# Patient Record
Sex: FEMALE | Race: WHITE | Employment: FULL TIME | URBAN - METROPOLITAN AREA
[De-identification: names, ages, dates, MRNs, and addresses within clinical notes are randomized per-mention and may not be internally consistent; named-entity substitution may affect disease eponyms.]

---

## 2018-01-17 ENCOUNTER — TELEPHONE (OUTPATIENT)
Dept: FAMILY MEDICINE | Facility: CLINIC | Age: 48
End: 2018-01-17

## 2018-01-17 NOTE — TELEPHONE ENCOUNTER
Spoke with patient and pharmacy and confirmed that medication was sent and will be ready for  today.   Pharmacy will call patient to let know when medication is ready.  Lesa Agarwal RN

## 2018-01-17 NOTE — TELEPHONE ENCOUNTER
Reason for call:  Medication   If this is a refill request, has the caller requested the refill from the pharmacy already? Yes  Will the patient be using a [facility]? No  Name of the pharmacy and phone number for the current request: [facility],     Name of the medication requested: Patient is unsure of the name, but it is the two active medications in her chart.    Other request: None      Phone number to reach patient:  Home number on file [phone] (home)    Best Time:  Anytime, ASAP    Can we leave a detailed message on this number?  YES

## 2018-02-01 NOTE — PROGRESS NOTES
"    SUBJECTIVE:                                                    Danielle Thrasher is a 47 year old female who presents to clinic today for the following health issues:  {Provider please address medication reconciliation discrepancies--rooming staff please delete if no med/rec issues}    {Superlists:421559}    {additional problems for provider to add:680399}    Problem list and histories reviewed & adjusted, as indicated.  Additional history: {NONE - AS DOCUMENTED:992190::\"as documented\"}    {HIST REVIEW/ LINKS 2:520555}    {PROVIDER CHARTING PREFERENCE:976076}      "

## 2018-02-06 ENCOUNTER — OFFICE VISIT (OUTPATIENT)
Dept: FAMILY MEDICINE | Facility: CLINIC | Age: 48
End: 2018-02-06
Payer: COMMERCIAL

## 2018-02-06 VITALS
DIASTOLIC BLOOD PRESSURE: 72 MMHG | WEIGHT: 151 LBS | OXYGEN SATURATION: 100 % | SYSTOLIC BLOOD PRESSURE: 132 MMHG | RESPIRATION RATE: 12 BRPM | HEIGHT: 62 IN | HEART RATE: 61 BPM | TEMPERATURE: 97.7 F | BODY MASS INDEX: 27.79 KG/M2

## 2018-02-06 DIAGNOSIS — Z00.00 ROUTINE GENERAL MEDICAL EXAMINATION AT A HEALTH CARE FACILITY: Primary | ICD-10-CM

## 2018-02-06 DIAGNOSIS — N94.6 DYSMENORRHEA: ICD-10-CM

## 2018-02-06 DIAGNOSIS — Z30.41 ENCOUNTER FOR SURVEILLANCE OF CONTRACEPTIVE PILLS: ICD-10-CM

## 2018-02-06 DIAGNOSIS — F41.1 GAD (GENERALIZED ANXIETY DISORDER): ICD-10-CM

## 2018-02-06 DIAGNOSIS — Z23 NEED FOR PROPHYLACTIC VACCINATION AND INOCULATION AGAINST INFLUENZA: ICD-10-CM

## 2018-02-06 DIAGNOSIS — J45.20 MILD INTERMITTENT ASTHMA WITHOUT COMPLICATION: ICD-10-CM

## 2018-02-06 DIAGNOSIS — Z12.31 VISIT FOR SCREENING MAMMOGRAM: ICD-10-CM

## 2018-02-06 PROCEDURE — 90471 IMMUNIZATION ADMIN: CPT | Performed by: FAMILY MEDICINE

## 2018-02-06 PROCEDURE — 99396 PREV VISIT EST AGE 40-64: CPT | Performed by: FAMILY MEDICINE

## 2018-02-06 PROCEDURE — 90686 IIV4 VACC NO PRSV 0.5 ML IM: CPT | Performed by: FAMILY MEDICINE

## 2018-02-06 RX ORDER — CITALOPRAM HYDROBROMIDE 20 MG/1
20 TABLET ORAL DAILY
Qty: 90 TABLET | Refills: 3 | Status: SHIPPED | OUTPATIENT
Start: 2018-02-06 | End: 2019-01-18

## 2018-02-06 NOTE — MR AVS SNAPSHOT
After Visit Summary   2/6/2018    Danielle Thrasher    MRN: [id number]           Patient Information     Date Of Birth          1970        Visit Information        Provider Department      2/6/2018 3:40 PM Mehnaz Cabrera MD [facility]        Today's Diagnoses     Routine general medical examination at a health care facility    -  1    Mild intermittent asthma without complication        BRIDGET (generalized anxiety disorder)        Dysmenorrhea        Encounter for surveillance of contraceptive pills        Visit for screening mammogram          Care Instructions    [facility]    If you have any questions regarding to your visit please contact your care team:       Team Red:   Clinic Hours Telephone Number   Dr. Mehnaz Lilly, NP   7am-7pm  Monday - Thursday   7am-5pm  Fridays  [phone]  (Appointment scheduling available 24/7)    Questions about your visit?   Team Line  [phone]   Urgent Care - [place] and [place] - 11am-9pm Monday-Friday Saturday-Sunday- 9am-5pm   [place] - 5pm-9pm Monday-Friday Saturday-Sunday- 9am-5pm  [phone] - [place]   [phone] - [place]       What options do I have for visits at the clinic other than the traditional office visit?  To expand how we care for you, many of our providers are utilizing electronic visits (e-visits) and telephone visits, when medically appropriate, for interactions with their patients rather than a visit in the clinic.   We also offer nurse visits for many medical concerns. Just like any other service, we will bill your insurance company for this type of visit based on time spent on the phone with your provider. Not all insurance companies cover these visits. Please check with your medical insurance if this type of visit is covered. You will be responsible for any charges that are not paid by your insurance.      E-visits via  UsTrendyhart:  generally incur a $35.00 fee.  Telephone visits:  Time spent on the phone: *charged based on time that is spent on the phone in increments of 10 minutes. Estimated cost:   5-10 mins $30.00   11-20 mins. $59.00   21-30 mins. $85.00     Use UsTrendyhart (secure email communication and access to your chart) to send your primary care provider a message or make an appointment. Ask someone on your Team how to sign up for G[employer]©nie NumÃ©rique.  For a Price Quote for your services, please call our [place] Line at [phone].      As always, Thank you for trusting us with your health care needs!      Preventive Health Recommendations  Female Ages 40 to 49    Yearly exam:     See your health care provider every year in order to  1. Review health changes.   2. Discuss preventive care.    3. Review your medicines if your doctor prescribed any.      Get a Pap test every three years (unless you have an abnormal result and your provider advises testing more often).      If you get Pap tests with HPV test, you only need to test every 5 years, unless you have an abnormal result. You do not need a Pap test if your uterus was removed (hysterectomy) and you have not had cancer.      You should be tested each year for STDs (sexually transmitted diseases), if you're at risk.       Ask your doctor if you should have a mammogram.      Have a colonoscopy (test for colon cancer) if someone in your family has had colon cancer or polyps before age 50.       Have a cholesterol test every 5 years.       Have a diabetes test (fasting glucose) after age 45. If you are at risk for diabetes, you should have this test every 3 years.    Shots: Get a flu shot each year. Get a tetanus shot every 10 years.     Nutrition:     Eat at least 5 servings of fruits and vegetables each day.    Eat whole-grain bread, whole-wheat pasta and brown rice instead of white grains and rice.    Talk to your provider about Calcium and Vitamin D.     Lifestyle    Exercise  at least 150 minutes a week (an average of 30 minutes a day, 5 days a week). This will help you control your weight and prevent disease.    Limit alcohol to one drink per day.    No smoking.     Wear sunscreen to prevent skin cancer.    See your dentist every six months for an exam and cleaning.          Follow-ups after your visit        Additional Services     OB/GYN REFERRAL       Your provider has referred you to:  [place]: [facility] [phone]   [email or web address]    Please be aware that coverage of these services is subject to the terms and limitations of your health insurance plan.  Call member services at your health plan with any benefit or coverage questions.      Please bring the following with you to your appointment:    (1) Any X-Rays, CTs or MRIs which have been performed.  Contact the facility where they were done to arrange for  prior to your scheduled appointment.   (2) List of current medications   (3) This referral request   (4) Any documents/labs given to you for this referral                  Follow-up notes from your care team     Return in about 1 year (around 2/6/2019) for physical, asthma.      Future tests that were ordered for you today     Open Future Orders        Priority Expected Expires Ordered    *MA Screening Digital Bilateral Routine  2/6/2019 2/6/2018    Lipid panel reflex to direct LDL Fasting Routine 3/20/2018 2/6/2019 2/6/2018            Who to contact     If you have questions or need follow up information about today's clinic visit or your schedule please contact [facility] [place] directly at [phone].  Normal or non-critical lab and imaging results will be communicated to you by MyChart, letter or phone within 4 business days after the clinic has received the results. If you do not hear from us within 7 days, please contact the clinic through MyChart or phone. If you have a critical or abnormal lab  "result, we will notify you by phone as soon as possible.  Submit refill requests through PeopleDoc or call your pharmacy and they will forward the refill request to us. Please allow 3 business days for your refill to be completed.          Additional Information About Your Visit        Agent Partnerhart Information     PeopleDoc gives you secure access to your electronic health record. If you see a primary care provider, you can also send messages to your care team and make appointments. If you have questions, please call your primary care clinic.  If you do not have a primary care provider, please call [phone] and they will assist you.        Care EveryWhere ID     This is your Care EveryWhere ID. This could be used by other organizations to access your [place] medical records  [id number]        Your Vitals Were     Pulse Temperature Respirations Height Last Period Pulse Oximetry    61 97.7  F (36.5  C) (Oral) 12 5' 2.25\" (1.581 m) 01/16/2018 100%    Breastfeeding? BMI (Body Mass Index)                No 27.4 kg/m2           Blood Pressure from Last 3 Encounters:   02/06/18 132/72   11/10/16 116/70   10/08/15 130/84    Weight from Last 3 Encounters:   02/06/18 151 lb (68.5 kg)   11/10/16 135 lb 12.8 oz (61.6 kg)   10/08/15 158 lb 6.4 oz (71.8 kg)              We Performed the Following     OB/GYN REFERRAL          Today's Medication Changes          These changes are accurate as of 2/6/18  4:27 PM.  If you have any questions, ask your nurse or doctor.               These medicines have changed or have updated prescriptions.        Dose/Directions    citalopram 20 MG tablet   Commonly known as:  celeXA   This may have changed:  See the new instructions.   Used for:  BRIDGET (generalized anxiety disorder)   Changed by:  Mehnaz Cabrera MD        Dose:  20 mg   Take 1 tablet (20 mg) by mouth daily   Quantity:  90 tablet   Refills:  3       levonorgestrel-ethinyl estradiol per tablet   Commonly known as:  ENPRESSE   This may " have changed:  See the new instructions.   Used for:  Encounter for surveillance of contraceptive pills   Changed by:  Mehnaz Cabrera MD        Dose:  1 tablet   Take 1 tablet by mouth daily   Quantity:  84 tablet   Refills:  4            Where to get your medicines      These medications were sent to [id number]  [place], [place] - [address] AT [facility] [address]  [address], [place] [address]     Phone:  [phone]     citalopram 20 MG tablet    levonorgestrel-ethinyl estradiol per tablet                Primary Care Provider Office Phone # Fax #    Mehnaz Cabrera [phone] [address]  [place] [address]        Equal Access to Services     : Hadii julia burgess hadasho Soomaali, waaxda luqadaha, qaybta kaalmada adeegyada, waxay alexin hayrasheed valentin . So [facility] [phone].    ATENCIÓN: Si habla español, tiene a geronimo disposición servicios gratuitos de asistencia lingüística. [facility] [phone].    We comply with applicable federal civil rights laws and [place] laws. We do not discriminate on the basis of race, color, national origin, age, disability, sex, sexual orientation, or gender identity.            Thank you!     Thank you for choosing [facility]  for your care. Our goal is always to provide you with excellent care. Hearing back from our patients is one way we can continue to improve our services. Please take a few minutes to complete the written survey that you may receive in the mail after your visit with us. Thank you!             Your Updated Medication List - Protect others around you: Learn how to safely use, store and throw away your medicines at www.disposemymeds.org.          This list is accurate as of 2/6/18  4:27 PM.  Always use your most recent med list.                   Brand Name Dispense Instructions for use Diagnosis    acetaminophen 500 MG tablet    TYLENOL    100  tablet    Take 1-2 tablets by mouth every 6 hours as needed for pain and fever.    Routine general medical examination at a health care facility       albuterol 108 (90 BASE) MCG/ACT Inhaler    PROAIR HFA/PROVENTIL HFA/VENTOLIN HFA    1 Inhaler    Inhale 2 puffs into the lungs every 4 hours as needed for shortness of breath / dyspnea or wheezing    Intermittent asthma, uncomplicated       calcium carbonate 1250 MG tablet    OS-JACKSON 500 mg [place]. Ca     Take 500 mg by mouth daily        cetirizine 10 MG tablet    ZYRTEC    90 tablet    Take 1 tablet by mouth daily.    Allergic rhinitis       citalopram 20 MG tablet    celeXA    90 tablet    Take 1 tablet (20 mg) by mouth daily    BRIDGET (generalized anxiety disorder)       levonorgestrel-ethinyl estradiol per tablet    ENPRESSE    84 tablet    Take 1 tablet by mouth daily    Encounter for surveillance of contraceptive pills       Multi-vitamin Tabs tablet   Generic drug:  multivitamin, therapeutic with minerals     90 tablet    Take 1 tablet by mouth daily.    Routine general medical examination at a health care facility       OMEGA-3 FISH OIL PO      Take by mouth daily        VITAMIN C PO      Take 500 mg by mouth

## 2018-02-06 NOTE — LETTER
2/6/2018          Danielle Thrasher  [address] [place] [address]    [id number]    Dear Danielle,    I recommend that you maintain a goal weight of 144 pounds.     Sincerely,        Mehnaz Cabrera MD  Dept of Family Practice

## 2018-02-06 NOTE — PATIENT INSTRUCTIONS
[facility]    If you have any questions regarding to your visit please contact your care team:       Team Red:   Clinic Hours Telephone Number   Dr. Mehnaz Lilly, NP   7am-7pm  Monday - Thursday   7am-5pm  Fridays  [phone]  (Appointment scheduling available 24/7)    Questions about your visit?   Team Line  [phone]   [facility] [place] [facility] [place] [facility] 11am-9pm Monday-Friday Saturday-Sunday- 9am-5pm   [place] - 5pm-9pm Monday-Friday Saturday-Sunday- 9am-5pm  [phone] - [place]   [phone] - [place]       What options do I have for visits at the clinic other than the traditional office visit?  To expand how we care for you, many of our providers are utilizing electronic visits (e-visits) and telephone visits, when medically appropriate, for interactions with their patients rather than a visit in the clinic.   We also offer nurse visits for many medical concerns. Just like any other service, we will bill your insurance company for this type of visit based on time spent on the phone with your provider. Not all insurance companies cover these visits. Please check with your medical insurance if this type of visit is covered. You will be responsible for any charges that are not paid by your insurance.      E-visits via Earthmill:  generally incur a $35.00 fee.  Telephone visits:  Time spent on the phone: *charged based on time that is spent on the phone in increments of 10 minutes. Estimated cost:   5-10 mins $30.00   11-20 mins. $59.00   21-30 mins. $85.00     Use Jajaht (secure email communication and access to your chart) to send your primary care provider a message or make an appointment. Ask someone on your Team how to sign up for Earthmill.  For a Price Quote for your services, please call our Consumer Price Line at [phone].      As always, Thank you for trusting us with your health care  needs!      Preventive Health Recommendations  Female Ages 40 to 49    Yearly exam:     See your health care provider every year in order to  1. Review health changes.   2. Discuss preventive care.    3. Review your medicines if your doctor prescribed any.      Get a Pap test every three years (unless you have an abnormal result and your provider advises testing more often).      If you get Pap tests with HPV test, you only need to test every 5 years, unless you have an abnormal result. You do not need a Pap test if your uterus was removed (hysterectomy) and you have not had cancer.      You should be tested each year for STDs (sexually transmitted diseases), if you're at risk.       Ask your doctor if you should have a mammogram.      Have a colonoscopy (test for colon cancer) if someone in your family has had colon cancer or polyps before age 50.       Have a cholesterol test every 5 years.       Have a diabetes test (fasting glucose) after age 45. If you are at risk for diabetes, you should have this test every 3 years.    Shots: Get a flu shot each year. Get a tetanus shot every 10 years.     Nutrition:     Eat at least 5 servings of fruits and vegetables each day.    Eat whole-grain bread, whole-wheat pasta and brown rice instead of white grains and rice.    Talk to your provider about Calcium and Vitamin D.     Lifestyle    Exercise at least 150 minutes a week (an average of 30 minutes a day, 5 days a week). This will help you control your weight and prevent disease.    Limit alcohol to one drink per day.    No smoking.     Wear sunscreen to prevent skin cancer.    See your dentist every six months for an exam and cleaning.

## 2018-02-06 NOTE — LETTER
My Asthma Action Plan  Name: Danielle Thrasher   YOB: 1970  Date: 2/6/2018   My doctor: Mehnaz Cabrera MD   My clinic: [facility]        My Control Medicine: None  My Rescue Medicine: Albuterol (Proair/Ventolin/Proventil) inhaler     My Asthma Severity: intermittent  Avoid your asthma triggers: upper respiratory infections               GREEN ZONE   Good Control    I feel good    No cough or wheeze    Can work, sleep and play without asthma symptoms       Take your asthma control medicine every day.     1. If exercise triggers your asthma, take your rescue medication    15 minutes before exercise or sports, and    During exercise if you have asthma symptoms  2. Spacer to use with inhaler: If you have a spacer, make sure to use it with your inhaler             YELLOW ZONE Getting Worse  I have ANY of these:    I do not feel good    Cough or wheeze    Chest feels tight    Wake up at night   1. Keep taking your Green Zone medications  2. Start taking your rescue medicine:    every 20 minutes for up to 1 hour. Then every 4 hours for 24-48 hours.  3. If you stay in the Yellow Zone for more than 12-24 hours, contact your doctor.  4. If you do not return to the Green Zone in 12-24 hours or you get worse, start taking your oral steroid medicine if prescribed by your provider.           RED ZONE Medical Alert - Get Help  I have ANY of these:    I feel awful    Medicine is not helping    Breathing getting harder    Trouble walking or talking    Nose opens wide to breathe       1. Take your rescue medicine NOW  2. If your provider has prescribed an oral steroid medicine, start taking it NOW  3. Call your doctor NOW  4. If you are still in the Red Zone after 20 minutes and you have not reached your doctor:    Take your rescue medicine again and    Call 911 or go to the emergency room right away    See your regular doctor within 2 weeks of an Emergency Room or Urgent Care visit for follow-up  treatment.        Electronically signed by: Mehnaz Cabrera, February 6, 2018    Annual Reminders:  Meet with Asthma Educator,  Flu Shot in the Fall, consider Pneumonia Vaccination for patients with asthma (aged 19 and older).    [id number] [place], [place] - [address] AT [address] [facility]                    Asthma Triggers  How To Control Things That Make Your Asthma Worse    Triggers are things that make your asthma worse.  Look at the list below to help you find your triggers and what you can do about them.  You can help prevent asthma flare-ups by staying away from your triggers.      Trigger                                                          What you can do   Cigarette Smoke  Tobacco smoke can make asthma worse. Do not allow smoking in your home, car or around you.  Be sure no one smokes at a child s day care or school.  If you smoke, ask your health care provider for ways to help you quit.  Ask family members to quit too.  Ask your health care provider for a referral to Quit Plan to help you quit smoking, or call 8-368-585-PLAN.     Colds, Flu, Bronchitis  These are common triggers of asthma. Wash your hands often.  Don t touch your eyes, nose or mouth.  Get a flu shot every year.     Dust Mites  These are tiny bugs that live in cloth or carpet. They are too small to see. Wash sheets and blankets in hot water every week.   Encase pillows and mattress in dust mite proof covers.  Avoid having carpet if you can. If you have carpet, vacuum weekly.   Use a dust mask and HEPA vacuum.   Pollen and Outdoor Mold  Some people are allergic to trees, grass, or weed pollen, or molds. Try to keep your windows closed.  Limit time out doors when pollen count is high.   Ask you health care provider about taking medicine during allergy season.     Animal Dander  Some people are allergic to skin flakes, urine or saliva from pets with fur or feathers. Keep pets with fur or  feathers out of your home.    If you can t keep the pet outdoors, then keep the pet out of your bedroom.  Keep the bedroom door closed.  Keep pets off cloth furniture and away from stuffed toys.     Mice, Rats, and Cockroaches  Some people are allergic to the waste from these pests.   Cover food and garbage.  Clean up spills and food crumbs.  Store grease in the refrigerator.   Keep food out of the bedroom.   Indoor Mold  This can be a trigger if your home has high moisture. Fix leaking faucets, pipes, or other sources of water.   Clean moldy surfaces.  Dehumidify basement if it is damp and smelly.   Smoke, Strong Odors, and Sprays  These can reduce air quality. Stay away from strong odors and sprays, such as perfume, powder, hair spray, paints, smoke incense, paint, cleaning products, candles and new carpet.   Exercise or Sports  Some people with asthma have this trigger. Be active!  Ask your doctor about taking medicine before sports or exercise to prevent symptoms.    Warm up for 5-10 minutes before and after sports or exercise.     Other Triggers of Asthma  Cold air:  Cover your nose and mouth with a scarf.  Sometimes laughing or crying can be a trigger.  Some medicines and food can trigger asthma.

## 2018-02-06 NOTE — PROGRESS NOTES
SUBJECTIVE:   CC: Danielle Thrasher is an 47 year old woman  who presents for preventive health visit.     Patient also has asthma, intermittent.  Patient has had asthma for years.  Occasional wheezing and shortness of breath are triggered by colds and relieved by albuterol. Patient also presents for follow-up of anxiety, a problem for years.  Excessive worrry, rumination and insomnia are not accompanied by palpitations.  Symptoms are controlled by medications with no side effects. She has painful periods x 6 months despite oral contraception.     Physical   Annual:     Getting at least 3 servings of Calcium per day::  Yes    Bi-annual eye exam::  Yes    Dental care twice a year::  NO    Sleep apnea or symptoms of sleep apnea::  None    Diet::  Other    Frequency of exercise::  6-7 days/week    Duration of exercise::  Greater than 60 minutes    Taking medications regularly::  Yes    Medication side effects::  None    Additional concerns today::  YES                Painful menstrual periods    Today's PHQ-2 Score:   PHQ-2 (  [employer]) 2018   Q1: Little interest or pleasure in doing things 1   Q2: Feeling down, depressed or hopeless 0   PHQ-2 Score 1   Q1: Little interest or pleasure in doing things Several days   Q2: Feeling down, depressed or hopeless Not at all   PHQ-2 Score 1       Abuse: Current or Past(Physical, Sexual or Emotional)- No  Do you feel safe in your environment - Yes    Social History   Substance Use Topics     Smoking status: Never Smoker     Smokeless tobacco: Never Used      Comment: non-smoking household     Alcohol use 1.5 oz/week     3 Standard drinks or equivalent per week      Comment: glass of wine 2-3 times weekly with 1 liquor drink on the weekend     Alcohol Use 2018   If you drink alcohol, do you typically have greater than 3 drinks per day OR greater than 7 drinks per week?   No   No flowsheet data found.    Reviewed orders with patient.  Reviewed health  maintenance and updated orders accordingly - Yes  Patient Active Problem List   Diagnosis     Allergic rhinitis     Patellofemoral pain syndrome     CARDIOVASCULAR SCREENING; LDL GOAL LESS THAN 160     BRIDGET (generalized anxiety disorder)     Intermittent asthma     Dysmenorrhea     Past Surgical History:   Procedure Laterality Date     EXCISE GANGLION WRIST      LT     TONSILLECTOMY & ADENOIDECTOMY         Social History   Substance Use Topics     Smoking status: Never Smoker     Smokeless tobacco: Never Used      Comment: non-smoking household     Alcohol use 1.5 oz/week     3 Standard drinks or equivalent per week      Comment: glass of wine 2-3 times weekly with 1 liquor drink on the weekend     Family History   Problem Relation Age of Onset     Breast Cancer Mother 56     CANCER Mother      skin     CANCER Father      skin     CANCER Maternal Grandfather      liver     DIABETES Maternal Grandfather      HEART DISEASE Maternal Grandfather      questionable MI     CANCER Maternal Grandmother      stomach     C.A.D. Paternal Grandfather 70     MI     Hypertension No family hx of      CEREBROVASCULAR DISEASE No family hx of          Current Outpatient Prescriptions   Medication Sig Dispense Refill     levonorgestrel-ethinyl estradiol (ENPRESSE) per tablet Take 1 tablet by mouth daily 84 tablet 4     citalopram (CELEXA) 20 MG tablet Take 1 tablet (20 mg) by mouth daily 90 tablet 3     Omega-3 Fatty Acids (OMEGA-3 FISH OIL PO) Take by mouth daily       calcium carbonate (OS-JACKSON 500 MG [place]. [place]) 500 MG tablet Take 500 mg by mouth daily       Ascorbic Acid (VITAMIN C PO) Take 500 mg by mouth       cetirizine (ZYRTEC) 10 MG tablet Take 1 tablet by mouth daily. 90 tablet 3     Multiple Vitamin (MULTI-VITAMIN) per tablet Take 1 tablet by mouth daily. 90 tablet 3     acetaminophen (TYLENOL) 500 MG tablet Take 1-2 tablets by mouth every 6 hours as needed for pain and fever. 100 tablet 11     albuterol (PROAIR HFA, PROVENTIL  "HFA, VENTOLIN HFA) 108 (90 BASE) MCG/ACT inhaler Inhale 2 puffs into the lungs every 4 hours as needed for shortness of breath / dyspnea or wheezing (Patient not taking: Reported on 2/6/2018) 1 Inhaler 5     No Known Allergies    Patient under age 50, mutual decision reflected in health maintenance.      Pertinent mammograms are reviewed under the imaging tab.  History of abnormal Pap smear: NO - age 30-65 PAP every 5 years with negative HPV co-testing recommended    Reviewed and updated as needed this visit by clinical staff  Tobacco  Allergies  Meds  Med Hx  Surg Hx  Fam Hx         Reviewed and updated as needed this visit by Provider  Meds  Med Hx  Surg Hx  Fam Hx        Past Medical History:   Diagnosis Date     Allergic rhinitis     mold     Major depressive disorder, recurrent episode (H) 2009     Mild persistent asthma      Patellofemoral pain syndrome      Tear meniscus knee 1998    left        Review of Systems  C: NEGATIVE for fever, chills, change in weight  I: NEGATIVE for worrisome rashes, moles or lesions  E: NEGATIVE for vision changes or irritation  ENT: NEGATIVE for ear, mouth and throat problems  R: NEGATIVE for significant cough or SOB  B: NEGATIVE for masses, tenderness or discharge  CV: NEGATIVE for chest pain, palpitations or peripheral edema  GI: NEGATIVE for nausea, abdominal pain, heartburn, or change in bowel habits   female: menses: dysmenorrhea    M: NEGATIVE for significant arthralgias or myalgia  N: NEGATIVE for weakness, dizziness or paresthesias  P: NEGATIVE for changes in mood or affect     OBJECTIVE:   /72  Pulse 61  Temp 97.7  F (36.5  C) (Oral)  Resp 12  Ht 5' 2.25\" (1.581 m)  Wt 151 lb (68.5 kg)  LMP 01/16/2018  SpO2 100%  Breastfeeding? No  BMI 27.4 kg/m2  Physical Exam  GENERAL: alert and no distress  EYES: Eyes grossly normal to inspection, PERRL and conjunctivae and sclerae normal  HENT: ear canals and TM's normal, nose and mouth without ulcers or " lesions  NECK: no adenopathy, no asymmetry, masses, or scars and thyroid normal to palpation  RESP: lungs clear to auscultation - no rales, rhonchi or wheezes  BREAST: normal without masses, tenderness or nipple discharge and no palpable axillary masses or adenopathy  CV: regular rate and rhythm, normal S1 S2, no S3 or S4, no murmur, click or rub, no peripheral edema and peripheral pulses strong  ABDOMEN: soft, nontender, no hepatosplenomegaly, no masses and bowel sounds normal  MS: no gross musculoskeletal defects noted, no edema  SKIN: no suspicious lesions or rashes  NEURO: Normal strength and tone, mentation intact and speech normal  PSYCH: mentation appears normal, affect normal/bright    ASSESSMENT/PLAN:   (Z00.00) Routine general medical examination at a health care facility  (primary encounter diagnosis)  Plan: Lipid panel reflex to direct LDL Fasting          (J45.20) Mild intermittent asthma without complication  Comment: Well controlled with medications without side effects.     (F41.1) BRIDGET (generalized anxiety disorder)  Comment: Well controlled with medications without side effects.   Plan: citalopram (CELEXA) 20 MG tablet          (N94.6) Dysmenorrhea  Plan: OB/GYN REFERRAL          (Z30.41) Encounter for surveillance of contraceptive pills  Plan: levonorgestrel-ethinyl estradiol (ENPRESSE) per        tablet             COUNSELING:  Reviewed preventive health counseling, as reflected in patient instructions  Special attention given to:        Regular exercise       Healthy diet/nutrition       Contraception       Osteoporosis Prevention/Bone Health       (Piedad)menopause management       The ASCVD Risk score (Jos LATISHA Jr, et al., 2013) failed to calculate for the following reasons:    Cannot find a previous HDL lab    Cannot find a previous total cholesterol lab    BP Screening:   Last 3 BP Readings:    BP Readings from Last 3 Encounters:   02/06/18 132/72   11/10/16 116/70   10/08/15 130/84       The  "following was recommended to the patient:  Re-screen BP within a year and recommended lifestyle modifications     reports that she has never smoked. She has never used smokeless tobacco.    Estimated body mass index is 27.4 kg/(m^2) as calculated from the following:    Height as of this encounter: 5' 2.25\" (1.581 m).    Weight as of this encounter: 151 lb (68.5 kg).   Weight management plan: Discussed healthy diet and exercise guidelines and patient will follow up in 12 months in clinic to re-evaluate.    Counseling Resources:  ATP IV Guidelines  Pooled Cohorts Equation Calculator  Breast Cancer Risk Calculator  FRAX Risk Assessment  ICSI Preventive Guidelines  Dietary Guidelines for Americans, 2010  USDA's MyPlate  ASA Prophylaxis  Lung CA Screening    Mehnaz Cabrera MD  [facility] [place]  "

## 2018-02-06 NOTE — PROGRESS NOTES

## 2018-02-14 ENCOUNTER — TELEPHONE (OUTPATIENT)
Dept: FAMILY MEDICINE | Facility: CLINIC | Age: 48
End: 2018-02-14

## 2018-02-14 ENCOUNTER — OFFICE VISIT (OUTPATIENT)
Dept: OBGYN | Facility: CLINIC | Age: 48
End: 2018-02-14
Payer: COMMERCIAL

## 2018-02-14 VITALS
BODY MASS INDEX: 28.08 KG/M2 | SYSTOLIC BLOOD PRESSURE: 120 MMHG | WEIGHT: 152.6 LBS | TEMPERATURE: 97.9 F | HEART RATE: 66 BPM | DIASTOLIC BLOOD PRESSURE: 74 MMHG | HEIGHT: 62 IN

## 2018-02-14 DIAGNOSIS — Z30.430 ENCOUNTER FOR IUD INSERTION: ICD-10-CM

## 2018-02-14 DIAGNOSIS — N94.6 DYSMENORRHEA: ICD-10-CM

## 2018-02-14 DIAGNOSIS — N94.3 PMS (PREMENSTRUAL SYNDROME): Primary | ICD-10-CM

## 2018-02-14 LAB — BETA HCG QUAL IFA URINE: NEGATIVE

## 2018-02-14 PROCEDURE — 84703 CHORIONIC GONADOTROPIN ASSAY: CPT | Performed by: OBSTETRICS & GYNECOLOGY

## 2018-02-14 PROCEDURE — 99203 OFFICE O/P NEW LOW 30 MIN: CPT | Mod: 25 | Performed by: OBSTETRICS & GYNECOLOGY

## 2018-02-14 PROCEDURE — 58300 INSERT INTRAUTERINE DEVICE: CPT | Mod: 52 | Performed by: OBSTETRICS & GYNECOLOGY

## 2018-02-14 NOTE — PROGRESS NOTES
"Chief Complaint   Patient presents with     Contraception       Initial /74 (BP Location: Right arm, Patient Position: Sitting, Cuff Size: Adult Regular)  Pulse 66  Temp 97.9  F (36.6  C) (Oral)  Ht 5' 2.25\" (1.581 m)  Wt 152 lb 9.6 oz (69.2 kg)  LMP 2018  Breastfeeding? No  BMI 27.69 kg/m2 Estimated body mass index is 27.69 kg/(m^2) as calculated from the following:    Height as of this encounter: 5' 2.25\" (1.581 m).    Weight as of this encounter: 152 lb 9.6 oz (69.2 kg).  BP completed using cuff size: regular        The following HM Due: NONE      The following patient reported/Care Every where data was sent to:  P ABSTRACT QUALITY INITIATIVES [54672]  n/a      n/a and patient has appointment for today            I was asked to see Danielle J Price by Dr Cabrera for consultation regarding mirena IUD placement       HPI:  Danielle Thrasher is a 47 year old  who presents today for a consult for IUD.       Period have been getting worse with bleeding and cramping.  Gradually getting worse over the past yr.   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   Mostly cramps and HA's so has to miss work sometimes.   The first and second day of her period she gets a severe HA in the middle of her head.   Periods are monthly and on oral contraceptive pills since 17 so that has been about  30 yrs now.  Was on at first  d/t cycle irregularity.  Stayed on this for contraception.  Never felt the need to have children. These symptoms are new in just the past 6-12 months.     The flow is stable and lasts 6 days. The flow is not a problem, mostly the severe cramps and headaches.    Dr Cabrera suggested IUD might help with these symptoms and provide long term contraception as well.   Long term relationship  16 yrs.  No plans for children.     Patients records are available and reviewed at today's visit.    Past GYN history:  unremarkable       Last PAP smear:    Lab " Results   Component Value Date    PAP NIL 09/24/2014    PAP NIL 04/06/2011    PAP ASC-US 03/04/2010        Past Medical History:   Diagnosis Date     Allergic rhinitis     mold     Major depressive disorder, recurrent episode (H) 2009     Mild persistent asthma      Patellofemoral pain syndrome      Tear meniscus knee 1998    left       Past Surgical History:   Procedure Laterality Date     EXCISE GANGLION WRIST      LT     TONSILLECTOMY & ADENOIDECTOMY         Family History   Problem Relation Age of Onset     Breast Cancer Mother 56     CANCER Mother      skin     CANCER Father      skin     CANCER Maternal Grandfather      liver     DIABETES Maternal Grandfather      HEART DISEASE Maternal Grandfather      questionable MI     CANCER Maternal Grandmother      stomach     C.A.D. Paternal Grandfather 70     MI     Hypertension No family hx of      CEREBROVASCULAR DISEASE No family hx of        Social History     Social History     Marital status:      Spouse name: Sunil     Number of children: 0     Years of education: 17     Occupational History      [employer]     Social History Main Topics     Smoking status: Never Smoker     Smokeless tobacco: Never Used      Comment: non-smoking household     Alcohol use 1.5 oz/week     3 Standard drinks or equivalent per week      Comment: glass of wine 2-3 times weekly with 1 liquor drink on the weekend     Drug use: No     Sexual activity: Yes     Partners: Male     Birth control/ protection: Pill     Other Topics Concern      Service No     Blood Transfusions No     Caffeine Concern No     Occupational Exposure No     Hobby Hazards No     Sleep Concern Yes     COMES AND GOES     Stress Concern No     Weight Concern Yes     Special Diet No     Back Care No     Exercise Yes     GYM MEMBERSHIP 3 HOURS WEEKLY     Bike Helmet Yes     Seat Belt Yes     Self-Exams Yes     Social History Narrative       Allergies: Review  "of patient's allergies indicates no known allergies.    Current Outpatient Prescriptions   Medication Sig Dispense Refill     levonorgestrel-ethinyl estradiol (ENPRESSE) per tablet Take 1 tablet by mouth daily 84 tablet 4     citalopram (CELEXA) 20 MG tablet Take 1 tablet (20 mg) by mouth daily 90 tablet 3     Omega-3 Fatty Acids (OMEGA-3 FISH OIL PO) Take by mouth daily       calcium carbonate (OS-JACKSON 500 MG [place]. CA) 500 MG tablet Take 500 mg by mouth daily       Ascorbic Acid (VITAMIN C PO) Take 500 mg by mouth       cetirizine (ZYRTEC) 10 MG tablet Take 1 tablet by mouth daily. 90 tablet 3     Multiple Vitamin (MULTI-VITAMIN) per tablet Take 1 tablet by mouth daily. 90 tablet 3     albuterol (PROAIR HFA, PROVENTIL HFA, VENTOLIN HFA) 108 (90 BASE) MCG/ACT inhaler Inhale 2 puffs into the lungs every 4 hours as needed for shortness of breath / dyspnea or wheezing (Patient not taking: Reported on 2/6/2018) 1 Inhaler 5     acetaminophen (TYLENOL) 500 MG tablet Take 1-2 tablets by mouth every 6 hours as needed for pain and fever. (Patient not taking: Reported on 2/14/2018) 100 tablet 11       ROS:  C: NEGATIVE for fever, chills, change in weight   GI: NEGATIVE for nausea, abdominal pain, heartburn, or change in bowel habits  : positive menstrual issues as noted above  M: NEGATIVE for significant arthralgias or myalgia  N: positive HA's  E: NEGATIVE for temperature intolerance, skin/hair changes  P: NEGATIVE for changes in mood or affect    EXAM:  Blood pressure 120/74, pulse 66, temperature 97.9  F (36.6  C), temperature source Oral, height 5' 2.25\" (1.581 m), weight 152 lb 9.6 oz (69.2 kg), last menstrual period 02/08/2018, not currently breastfeeding.   BMI= Body mass index is 27.69 kg/(m^2).  General - pleasant female in no acute distress.  Neck - supple   normal respiratory and cardiovascular effort   Breast - deferred.  Abdomen - soft, nontender, nondistended   Pelvic exam:  External genitalia appeared normal " without lesion.  Urethral meatus, perineum, and anus appeared normal. Cervix and vagina appeared normal, without lesion or discharge. Bimanual exam difficult to feel the uterus but does not feel enlarged, non-tender uterus, with no adnexal masses.  Rectovaginal deferred.   Musculoskeletal - no gross deformities.  Skin- no rashes seen  Neurological - normal mood and affect.      PROCEDURE:  After informed consent was obtained from the patient, a speculum was placed in the vagina to visualize the cervix.  The cervix was then swabbed with a betadine prep. A paracervical block was placed with 5 cc of 1% lidocaine injected into the 4 and 8 o'clock postions of the cervical-vaginal junction. Tenaculum was placed at the 12 o'clock position on the cervix and the cervix was serially dilated enough to get the uterine sound thru the os.  The uterus sounded to 9 cm.   That seemed too deep for a nulliparous woman.  Given the concern for either abnormal uterus or possible perforation with the sound,  The procedure was discontinued and the decision to perform the insertion  Under US guidance was made.   Tenaculum was removed and cervix was hemostatic. There were no obvious complications.  There was no bleeding.  The patient tolerated the procedure well.      ASSESSMENT/ PLAN:  (N94.3)   PMS  Comment: headaches and cramps  Plan:  discussed symptoms elimination by eliminating her cycles    (N94.6) Dysmenorrhea  Comment:  mirena typically helps with this  Plan: US Pelvic Complete with Transvaginal            (Z30.430) Encounter for IUD insertion  Comment:  Insertion discontinued when uterus sounded to 9 cm.  That seemed too deep for nulliparous female at 47.    Plan: Beta HCG qual IFA urine, US Pelvic Complete         with Transvaginal, INSERTION INTRAUTERINE         DEVICE      discussed a safer option would be to insert the IUD under US guidance.  Patient was agreeable.         A copy of the chart will be routed to the referring  provider.    Fani Jacobsen

## 2018-02-14 NOTE — MR AVS SNAPSHOT
After Visit Summary   2/14/2018    Danielle Thrasher    MRN: [id number]           Patient Information     Date Of Birth          1970        Visit Information        Provider Department      2/14/2018 3:30 PM Fani Jacobsen MD [facility]        Today's Diagnoses     Encounter for IUD insertion    -  1      Care Instructions    What Mirena Users May Expect    What to watch for right after Mirena is placed  Some women may experience uterine cramps, bleeding, and/or dizziness during and right after Mirena is placed. To help minimize the cramps, you may taken ibuprofen 600 mg with food prior to your appointment. These symptoms should improve over the next 24 hours.  Mild cramping may be present for a few days after your placement  As a follow up, you should check your strings on 4 weeks or visit your clinic once in the first 4 to 12 weeks after Mirena is placed to make sure it is in the right position. After that, Mirena can be checked once a year as part of your routine exam.    Please use a back-up method (abstinence or condoms) for 5 days after placement.    Your periods may change  For the first 3 to 6 months, your monthly period may become irregular. You may also have frequent spotting or light bleeding. A few women have heavy bleeding during this time. After your body adjusts, the number of bleeding days is likely to decrease (but may remain irregular), and you may even find that your periods stop altogether for as long as Mirena is in place. Around the end of the third month of use, you may see up to a 75% reduction in the amount of menstrual bleeding. By one year, about 1 out of 5 users may hay have no period at all. At the end of two years, 70% have little or no bleeding. Your periods will return rapidly once Mirena is removed.     Mirena Strings  You may check your own Mirena strings by inserting a finger into the vagina and feeling the strings as they exit the  cervix.  The strings will initially feel firm, like fishing line, but will soften over a few weeks.  After the strings have softened, you or your partner should not be able to feel the strings during intercourse.  If you can feel the IUD, see your healthcare provider to have the position confirmed.  You may use tampons with Mirena in place.    Mirena does not protect against HIV or STDs.  Mirena does not prevent the formation of ovarian cysts.  Mirena does not typically reduce acne or cause weight gain or mood changes.    Please call [facility] at [phone] if you have questions or concerns.    For more information:  [email or web address]            Follow-ups after your visit        Who to contact     If you have questions or need follow up information about today's clinic visit or your schedule please contact [facility] directly at [phone].  Normal or non-critical lab and imaging results will be communicated to you by MyChart, letter or phone within 4 business days after the clinic has received the results. If you do not hear from us within 7 days, please contact the clinic through [employer] or phone. If you have a critical or abnormal lab result, we will notify you by phone as soon as possible.  Submit refill requests through [employer] or call your pharmacy and they will forward the refill request to us. Please allow 3 business days for your refill to be completed.          Additional Information About Your Visit        [employer] Information     [employer] gives you secure access to your electronic health record. If you see a primary care provider, you can also send messages to your care team and make appointments. If you have questions, please call your primary care clinic.  If you do not have a primary care provider, please call [phone] and they will assist you.        Care EveryWhere ID     This is your Care EveryWhere ID. This could be used by other organizations  "to access your [facility] medical records  [id number]        Your Vitals Were     Pulse Temperature Height Last Period Breastfeeding? BMI (Body Mass Index)    66 97.9  F (36.6  C) (Oral) 5' 2.25\" (1.581 m) 02/08/2018 No 27.69 kg/m2       Blood Pressure from Last 3 Encounters:   02/14/18 120/74   02/06/18 132/72   11/10/16 116/70    Weight from Last 3 Encounters:   02/14/18 152 lb 9.6 oz (69.2 kg)   02/06/18 151 lb (68.5 kg)   11/10/16 135 lb 12.8 oz (61.6 kg)              We Performed the Following     Beta HCG qual IFA urine        Primary Care Provider Office Phone # Fax #    Mehnaz Cabrera [phone] [address] [facility] [address]        Equal Access to Services     [facility]: Hadii julia ku hadasho Soomaali, waaxda luqadaha, qaybta kaalmada adeegyada, waxay mariya valentin . So [facility] [phone].    ATENCIÓN: Si habla español, tiene a geronimo disposición servicios gratuitos de asistencia lingüística. Yolanda al [phone].    We comply with applicable federal civil rights laws and [place] laws. We do not discriminate on the basis of race, color, national origin, age, disability, sex, sexual orientation, or gender identity.            Thank you!     Thank you for choosing [facility]  for your care. Our goal is always to provide you with excellent care. Hearing back from our patients is one way we can continue to improve our services. Please take a few minutes to complete the written survey that you may receive in the mail after your visit with us. Thank you!             Your Updated Medication List - Protect others around you: Learn how to safely use, store and throw away your medicines at www.disposemymeds.org.          This list is accurate as of 2/14/18  4:46 PM.  Always use your most recent med list.                   Brand Name Dispense Instructions for use Diagnosis    acetaminophen 500 MG tablet    TYLENOL    100 tablet    Take 1-2 " tablets by mouth every 6 hours as needed for pain and fever.    Routine general medical examination at a health care facility       albuterol 108 (90 BASE) MCG/ACT Inhaler    PROAIR HFA/PROVENTIL HFA/VENTOLIN HFA    1 Inhaler    Inhale 2 puffs into the lungs every 4 hours as needed for shortness of breath / dyspnea or wheezing    Intermittent asthma, uncomplicated       calcium carbonate 1250 MG tablet    OS-JACKSON 500 mg [place]. [place]     Take 500 mg by mouth daily        cetirizine 10 MG tablet    ZYRTEC    90 tablet    Take 1 tablet by mouth daily.    Allergic rhinitis       citalopram 20 MG tablet    celeXA    90 tablet    Take 1 tablet (20 mg) by mouth daily    BRIDGET (generalized anxiety disorder)       levonorgestrel-ethinyl estradiol per tablet    ENPRESSE    84 tablet    Take 1 tablet by mouth daily    Encounter for surveillance of contraceptive pills       Multi-vitamin Tabs tablet   Generic drug:  multivitamin, therapeutic with minerals     90 tablet    Take 1 tablet by mouth daily.    Routine general medical examination at a health care facility       OMEGA-3 FISH OIL PO      Take by mouth daily        VITAMIN C PO      Take 500 mg by mouth

## 2018-02-14 NOTE — PATIENT INSTRUCTIONS

## 2018-02-14 NOTE — LETTER
[facility]  [address] [facility] [address]  [phone]    February 15, 2018      Danilele Thrasher  [address] [address]      Dear Danielle,     Your clinic record indicates that you are due for an asthma update. We have a survey tool called an ACT (or Asthma Control Test) we use to measure the level of control of your asthma. Please complete the enclosed questionnaire and mail it back to us in the self-addressed stamped envelope.     If you have questions about this letter please contact your provider.     Sincerely,    [facility]  chantal

## 2018-02-14 NOTE — Clinical Note
Macario Holman,  Could you please set this lady up for an IUD insertion under US guidance.   I tried in the office but feel that it would be safer to do this under US guidance.  She is expecting a call to get it set up.  Thanks! Fani

## 2018-02-14 NOTE — Clinical Note
Should I bill for discont service or not bill at all for the attempt at insertion.  ?  Would this be a C3?

## 2018-02-14 NOTE — Clinical Note
Macario Darby,  I am going to insert her IUD under US guidance. Did not feel comfortable proceding with in office insertion when uterus sounded to 9 cm. That seemed too deep to me.  Thanks for the consult.  Fani

## 2018-03-05 ENCOUNTER — OFFICE VISIT (OUTPATIENT)
Dept: OBGYN | Facility: CLINIC | Age: 48
End: 2018-03-05
Attending: OBSTETRICS & GYNECOLOGY
Payer: COMMERCIAL

## 2018-03-05 ENCOUNTER — RADIANT APPOINTMENT (OUTPATIENT)
Dept: MAMMOGRAPHY | Facility: CLINIC | Age: 48
End: 2018-03-05
Attending: FAMILY MEDICINE
Payer: COMMERCIAL

## 2018-03-05 ENCOUNTER — RADIANT APPOINTMENT (OUTPATIENT)
Dept: ULTRASOUND IMAGING | Facility: CLINIC | Age: 48
End: 2018-03-05
Attending: OBSTETRICS & GYNECOLOGY
Payer: COMMERCIAL

## 2018-03-05 DIAGNOSIS — Z30.430 ENCOUNTER FOR IUD INSERTION: Primary | ICD-10-CM

## 2018-03-05 DIAGNOSIS — Z12.31 VISIT FOR SCREENING MAMMOGRAM: ICD-10-CM

## 2018-03-05 DIAGNOSIS — Z30.430 ENCOUNTER FOR IUD INSERTION: ICD-10-CM

## 2018-03-05 DIAGNOSIS — N94.6 DYSMENORRHEA: ICD-10-CM

## 2018-03-05 LAB — BETA HCG QUAL IFA URINE: NEGATIVE

## 2018-03-05 PROCEDURE — 76830 TRANSVAGINAL US NON-OB: CPT | Performed by: OBSTETRICS & GYNECOLOGY

## 2018-03-05 PROCEDURE — 84703 CHORIONIC GONADOTROPIN ASSAY: CPT | Performed by: OBSTETRICS & GYNECOLOGY

## 2018-03-05 PROCEDURE — 58300 INSERT INTRAUTERINE DEVICE: CPT | Performed by: OBSTETRICS & GYNECOLOGY

## 2018-03-05 PROCEDURE — 77067 SCR MAMMO BI INCL CAD: CPT | Mod: TC

## 2018-03-05 PROCEDURE — 77063 BREAST TOMOSYNTHESIS BI: CPT | Mod: TC

## 2018-03-05 NOTE — PROGRESS NOTES
MIRENA     LOT: [id number]    EXP: 09/20    NDC: 67336-898-73    CC:  IUD insertion  HPI:  Danielle Thrasher  is a 47 year old  female  who is  here for an IUD insertion under US guidance.  The insertion was attempted a few weeks ago but discontinued d/t concern for possible uterine perforation when uterus was sounded to 9 cm.  Seemed that that was too large for nulliparous lady. So she is here for insertion under US guidance.    We discussed this IUD at the last visit and again today.  She would like the mirena IUD.  She is aware of the side effects and small but possible failure rate, and the risk for an ectopic pregnancy. Patient has verbalized understanding of risks and benefits and has signed the consent form.       Past Medical History:   Diagnosis Date     Allergic rhinitis     mold     Major depressive disorder, recurrent episode (H) 2009     Mild persistent asthma      Patellofemoral pain syndrome      Tear meniscus knee 1998    left        Past Surgical History:   Procedure Laterality Date     EXCISE GANGLION WRIST      LT     TONSILLECTOMY & ADENOIDECTOMY          Current Outpatient Prescriptions   Medication Sig Dispense Refill     levonorgestrel (MIRENA) 20 MCG/24HR IUD 1 each (20 mcg) by Intrauterine route once for 1 dose 1 each 0     levonorgestrel-ethinyl estradiol (ENPRESSE) per tablet Take 1 tablet by mouth daily 84 tablet 4     citalopram (CELEXA) 20 MG tablet Take 1 tablet (20 mg) by mouth daily 90 tablet 3     Omega-3 Fatty Acids (OMEGA-3 FISH OIL PO) Take by mouth daily       albuterol (PROAIR HFA, PROVENTIL HFA, VENTOLIN HFA) 108 (90 BASE) MCG/ACT inhaler Inhale 2 puffs into the lungs every 4 hours as needed for shortness of breath / dyspnea or wheezing (Patient not taking: Reported on 2/6/2018) 1 Inhaler 5     calcium carbonate (OS-JACKSON 500 MG [place]. [place]) 500 MG tablet Take 500 mg by mouth daily       Ascorbic Acid (VITAMIN C PO) Take 500 mg by mouth       cetirizine (ZYRTEC) 10 MG tablet Take 1  tablet by mouth daily. 90 tablet 3     Multiple Vitamin (MULTI-VITAMIN) per tablet Take 1 tablet by mouth daily. 90 tablet 3     acetaminophen (TYLENOL) 500 MG tablet Take 1-2 tablets by mouth every 6 hours as needed for pain and fever. (Patient not taking: Reported on 2/14/2018) 100 tablet 11             EXAM:  LMP 02/08/2018   UPT  negative   General - pleasant female in no acute distress.  Pelvic - EG: normal adult female, BUS: within normal limits, Vagina: well rugated, no discharge, Cervix: no lesions or CMT, Uterus: firm, slightly enlarged sized and nontender, Adnexae: no masses or tenderness.    US today demonstrates a uterus which is ~ 9cm in length.  Small subserosal fibroid.  Normal ovaries.  Anteverted uterus.        PROCEDURE:    After informed consent was obtained from the patient, a speculum was placed in the vagina to visualize the cervix.  The cervix was then swabbed with a betadine prep. A paracervical block was placed with 5 cc of 1% lidocaine injected into the 4 and 8 o'clock postions of the cervical-vaginal junction. Tenaculum was placed at the 12 o'clock position on the cervix and the uterus sounded to 9 cm under US guidance.  The mirena  IUD was then placed in the usual fashion under sterile technique. It was inserted to fundus under US guidance.   Strings were clipped about 2-3 cm from the cervical os.  Tenaculum was removed and cervix was hemostatic. There were no complications. The patient tolerated the procedure well.    LOT # *[id number]     LOT: [id number]    EXP: 09/20    NDC: 94125-008-66      ASSESSMENT/PLAN:  Insertion of IUD    The patient should return to clinic for a speculum examination for confirmation that the IUD is in place in about 4 wks or after her next period, which ever is first. Bleeding pattern of this particular IUD was discussed with the patient. Warning signs of complications were also discussed.  She will call the office with excessive bleeding or severe pain.

## 2018-03-05 NOTE — MR AVS SNAPSHOT
After Visit Summary   3/5/2018    Danielle Thrasher    MRN: [id number]           Patient Information     Date Of Birth          1970        Visit Information        Provider Department      3/5/2018 2:30 PM Fani Jacobsen MD [facility]        Today's Diagnoses     Encounter for IUD insertion    -  1      Care Instructions    What Mirena Users May Expect    What to watch for right after Mirena is placed  Some women may experience uterine cramps, bleeding, and/or dizziness during and right after Mirena is placed. To help minimize the cramps, you may taken ibuprofen 600 mg with food prior to your appointment. These symptoms should improve over the next 24 hours.  Mild cramping may be present for a few days after your placement  As a follow up, you should check your strings on 4 weeks or visit your clinic once in the first 4 to 12 weeks after Mirena is placed to make sure it is in the right position. After that, Mirena can be checked once a year as part of your routine exam.    Please use a back-up method (abstinence or condoms) for 5 days after placement.    Your periods may change  For the first 3 to 6 months, your monthly period may become irregular. You may also have frequent spotting or light bleeding. A few women have heavy bleeding during this time. After your body adjusts, the number of bleeding days is likely to decrease (but may remain irregular), and you may even find that your periods stop altogether for as long as Mirena is in place. Around the end of the third month of use, you may see up to a 75% reduction in the amount of menstrual bleeding. By one year, about 1 out of 5 users may hay have no period at all. At the end of two years, 70% have little or no bleeding. Your periods will return rapidly once Mirena is removed.     Mirena Strings  You may check your own Mirena strings by inserting a finger into the vagina and feeling the strings as they exit the  cervix.  The strings will initially feel firm, like fishing line, but will soften over a few weeks.  After the strings have softened, you or your partner should not be able to feel the strings during intercourse.  If you can feel the IUD, see your healthcare provider to have the position confirmed.  You may use tampons with Mirena in place.    Mirena does not protect against HIV or STDs.  Mirena does not prevent the formation of ovarian cysts.  Mirena does not typically reduce acne or cause weight gain or mood changes.    Please call [facility] at [phone] if you have questions or concerns.    For more information:  [email or web address]            Follow-ups after your visit        Your next 10 appointments already scheduled     Mar 05, 2018  2:20 PM CST   US PELVIC COMPLETE W TRANSVAGINAL with [id number]   [facility] ([facility])    [address] [place] [address] [phone]           Please bring a list of your medicines (including vitamins, minerals and over-the-counter drugs). Also, tell your doctor about any allergies you may have. Wear comfortable clothes and leave your valuables at home.  Adults: Drink six 8-ounce glasses of fluid one hour before your exam. Do NOT empty your bladder.  If you need to empty your bladder before your exam, try to release only a little bit of urine. Then, drink another 8oz glass of fluid.  Children: Children who are potty trained should drink at least 4 cups (32 oz) of liquid 45 minutes to one hour prior to the exam. The child s bladder must be full in order to achieve a diagnostic exam. If your child is very uncomfortable or has an urgent need to pee, please notify a technologist; they will try to find out how much longer the wait may be and provide instructions to help relieve the pressure. Occasionally it is medically necessary to insert a urinary catheter to fill the bladder.  Please call the  Imaging Department at your exam site with any questions.            Mar 05, 2018  2:30 PM CST   Office Visit with Fani Jacobsen MD   [facility] ([facility])    [address] [place] [address] [phone]           Bring a current list of meds and any records pertaining to this visit. For Physicals, please bring immunization records and any forms needing to be filled out. Please arrive 10 minutes early to complete paperwork.              Who to contact     If you have questions or need follow up information about today's clinic visit or your schedule please contact [facility] directly at [phone].  Normal or non-critical lab and imaging results will be communicated to you by MyChart, letter or phone within 4 business days after the clinic has received the results. If you do not hear from us within 7 days, please contact the clinic through [employer] or phone. If you have a critical or abnormal lab result, we will notify you by phone as soon as possible.  Submit refill requests through [employer] or call your pharmacy and they will forward the refill request to us. Please allow 3 business days for your refill to be completed.          Additional Information About Your Visit        MyChart Information     [employer] gives you secure access to your electronic health record. If you see a primary care provider, you can also send messages to your care team and make appointments. If you have questions, please call your primary care clinic.  If you do not have a primary care provider, please call [phone] and they will assist you.        Care EveryWhere ID     This is your Care EveryWhere ID. This could be used by other organizations to access your [place] medical records  [id number]        Your Vitals Were     Last Period                   02/08/2018            Blood Pressure from Last 3 Encounters:   02/14/18 120/74   02/06/18 132/72    11/10/16 116/70    Weight from Last 3 Encounters:   02/14/18 152 lb 9.6 oz (69.2 kg)   02/06/18 151 lb (68.5 kg)   11/10/16 135 lb 12.8 oz (61.6 kg)              We Performed the Following     Beta HCG qual IFA urine        Primary Care Provider Office Phone # Fax #    Mehnaz Cabrera [phone] [address] [facility] [address]        Equal Access to Services     LEANDER DUNLAP : Hadii aad ku hadasho Soomaali, waaxda luqadaha, qaybta kaalmada adeegyada, waxay idiin hayaan adeeg kharash lajeff . So [facility] [phone].    ATENCIÓN: Si habla español, tiene a geronimo disposición servicios gratuitos de asistencia lingüística. [facility] [phone].    We comply with applicable federal civil rights laws and [place] laws. We do not discriminate on the basis of race, color, national origin, age, disability, sex, sexual orientation, or gender identity.            Thank you!     Thank you for choosing [facility]  for your care. Our goal is always to provide you with excellent care. Hearing back from our patients is one way we can continue to improve our services. Please take a few minutes to complete the written survey that you may receive in the mail after your visit with us. Thank you!             Your Updated Medication List - Protect others around you: Learn how to safely use, store and throw away your medicines at www.disposemymeds.org.          This list is accurate as of 3/5/18  2:12 PM.  Always use your most recent med list.                   Brand Name Dispense Instructions for use Diagnosis    acetaminophen 500 MG tablet    TYLENOL    100 tablet    Take 1-2 tablets by mouth every 6 hours as needed for pain and fever.    Routine general medical examination at a health care facility       albuterol 108 (90 BASE) MCG/ACT Inhaler    PROAIR HFA/PROVENTIL HFA/VENTOLIN HFA    1 Inhaler    Inhale 2 puffs into the lungs every 4 hours as needed for shortness of breath / dyspnea or  wheezing    Intermittent asthma, uncomplicated       calcium carbonate 1250 MG tablet    OS-JACKSON 500 mg [place]. Ca     Take 500 mg by mouth daily        cetirizine 10 MG tablet    ZYRTEC    90 tablet    Take 1 tablet by mouth daily.    Allergic rhinitis       citalopram 20 MG tablet    celeXA    90 tablet    Take 1 tablet (20 mg) by mouth daily    BRIDGET (generalized anxiety disorder)       levonorgestrel-ethinyl estradiol per tablet    ENPRESSE    84 tablet    Take 1 tablet by mouth daily    Encounter for surveillance of contraceptive pills       Multi-vitamin Tabs tablet   Generic drug:  multivitamin, therapeutic with minerals     90 tablet    Take 1 tablet by mouth daily.    Routine general medical examination at a health care facility       OMEGA-3 FISH OIL PO      Take by mouth daily        VITAMIN C PO      Take 500 mg by mouth

## 2018-03-05 NOTE — PATIENT INSTRUCTIONS

## 2018-05-03 ENCOUNTER — OFFICE VISIT (OUTPATIENT)
Dept: OBGYN | Facility: CLINIC | Age: 48
End: 2018-05-03
Payer: COMMERCIAL

## 2018-05-03 VITALS
SYSTOLIC BLOOD PRESSURE: 119 MMHG | TEMPERATURE: 98.5 F | DIASTOLIC BLOOD PRESSURE: 81 MMHG | OXYGEN SATURATION: 98 % | HEART RATE: 63 BPM | WEIGHT: 151.8 LBS | BODY MASS INDEX: 27.94 KG/M2 | HEIGHT: 62 IN

## 2018-05-03 DIAGNOSIS — Z30.431 IUD CHECK UP: Primary | ICD-10-CM

## 2018-05-03 PROCEDURE — 99212 OFFICE O/P EST SF 10 MIN: CPT | Performed by: OBSTETRICS & GYNECOLOGY

## 2018-05-03 NOTE — PROGRESS NOTES
"Chief Complaint   Patient presents with     IUD     RECHECK       Initial Ht 5' 2.25\" (1.581 m) Estimated body mass index is 27.69 kg/(m^2) as calculated from the following:    Height as of 18: 5' 2.25\" (1.581 m).    Weight as of 18: 152 lb 9.6 oz (69.2 kg).  BP completed using cuff size: regular        The following HM Due: NONE      The following patient reported/Care Every where data was sent to:  P ABSTRACT QUALITY INITIATIVES [87812]  n/a      n/a and patient has appointment for today            Danielle Thrasher is 47 year old female here for an IUD check.  She had a mirena IUD placed about 2 months ago, without complication.  Done under US guidance.  Since then she has been doing well. Random bleeding which is improving.    Happy with it.  No pain.     OBJECTIVE:  /81 (BP Location: Right arm, Patient Position: Sitting, Cuff Size: Adult Regular)  Pulse 63  Temp 98.5  F (36.9  C) (Oral)  Ht 5' 2.25\" (1.581 m)  Wt 151 lb 12.8 oz (68.9 kg)  LMP   SpO2 98%  BMI 27.54 kg/m2   Gen: pleasant, NAD  normal respiratory and cardiovascular effort     Pelvic:  EG - normal adult female.  BUS - within normal limits.  Vagina - well rugated, moderate white discharge.  Cervix - no lesions, no CMT.     IUD strings noted at the cervix about 3 cm long.    ASSESSMENT:  IUD well placed  Reviewed bleeding pattern of this IUD and what to expect.    PLAN:  return to clinic when due for next annual physical exam  or with any concerns in regards to her IUD.    Fani Jacobsen MD   "

## 2018-09-04 ENCOUNTER — TELEPHONE (OUTPATIENT)
Dept: FAMILY MEDICINE | Facility: CLINIC | Age: 48
End: 2018-09-04

## 2018-09-04 NOTE — LETTER
September 4, 2018          Danielle Thrasher,  [address]  [place] [address]        Dear Danielle Thrasher      Monitoring and managing your preventative and chronic health conditions are very important to us. Our records indicate that you have not scheduled for Asthma Check  which was recommended by Dr. Cabrera      If you have received your health care elsewhere, please call the clinic so the information can be documented in your chart.    Please call [phone] or message us through your [employer] account to schedule an appointment or provide information for your chart.     Feel free to contact us if you have any questions or concerns!    I look forward to seeing you and working with you on your health care needs.     Sincerely,         Mehnaz Cabrera / chantal

## 2019-01-18 DIAGNOSIS — F41.1 GAD (GENERALIZED ANXIETY DISORDER): ICD-10-CM

## 2019-01-18 RX ORDER — CITALOPRAM HYDROBROMIDE 20 MG/1
20 TABLET ORAL DAILY
Qty: 90 TABLET | Refills: 0 | Status: SHIPPED | OUTPATIENT
Start: 2019-01-18 | End: 2019-04-01

## 2019-01-18 NOTE — TELEPHONE ENCOUNTER
Reason for Call:  Medication or medication refill:    Do you use a [facility]?  Name of the pharmacy and phone number for the current request:    [id number] - [place] - [address] AT [facility] [address]  [address] [facility]  [place] [address]  Phone: [phone] Fax: [phone]      Name of the medication requested: citalopram (CELEXA) 20 MG tablet       Other request: Called pharmacy and needs Dr. Ordonez.     Can we leave a detailed message on this number? YES    Phone number patient can be reached at: Cell number on file:    Telephone Information:   Mobile [phone]       Best Time: any     Call taken on 1/18/2019 at 12:09 PM by Miguel Hogan

## 2019-01-18 NOTE — TELEPHONE ENCOUNTER
Prescription sent  Due for annual visit in February LM on patient's VM with message above    Christophe Rand RN

## 2019-01-30 ENCOUNTER — TELEPHONE (OUTPATIENT)
Dept: FAMILY MEDICINE | Facility: CLINIC | Age: 49
End: 2019-01-30

## 2019-01-30 NOTE — LETTER
January 30, 2019      Danielle Thrasher  [address] [facility] [address]      Dear Danielle,     Your clinic record indicates that you are due for an asthma update. We have a survey tool called an ACT (or Asthma Control Test) we use to measure the level of control of your asthma. Please complete the enclosed questionnaire and mail it back to us in the self-addressed stamped envelope.     If you have questions about this letter please contact your provider.     Sincerely,    Your [facility]sp

## 2019-01-30 NOTE — TELEPHONE ENCOUNTER
Panel Management Review      Patient has the following on her problem list:     Asthma review     ACT Total Scores 11/10/2016   ACT TOTAL SCORE -   ASTHMA ER VISITS -   ASTHMA HOSPITALIZATIONS -   ACT TOTAL SCORE (Goal Greater than or Equal to 20) 25   In the past 12 months, how many times did you visit the emergency room for your asthma without being admitted to the hospital? 0   In the past 12 months, how many times were you hospitalized overnight because of your asthma? 0      1. Is Asthma diagnosis on the Problem List? Yes    2. Is Asthma listed on Health Maintenance? Yes    3. Patient is due for:  ACT      Composite cancer screening  Chart review shows that this patient is due/due soon for the following None  Summary:    Patient is due/failing the following:   ACT    Action needed:   Patient needs to do ACT.    Type of outreach:    Sent letter.    Questions for provider review:    None                                                                                                                                    CICI Deleon       Chart routed to None, sent ACT letter and ACT .

## 2019-02-14 DIAGNOSIS — F41.1 GAD (GENERALIZED ANXIETY DISORDER): ICD-10-CM

## 2019-02-14 RX ORDER — CITALOPRAM HYDROBROMIDE 20 MG/1
TABLET ORAL
Qty: 90 TABLET | Refills: 0 | Status: SHIPPED | OUTPATIENT
Start: 2019-02-14 | End: 2019-04-01

## 2019-02-14 NOTE — TELEPHONE ENCOUNTER
"Requested Prescriptions   Pending Prescriptions Disp Refills     citalopram (CELEXA) 20 MG tablet [Pharmacy Med Name: CITALOPRAM 20MG TABLETS] 90 tablet 0     Sig: TAKE 1 TABLET(20 MG) BY MOUTH DAILY    SSRIs Protocol Failed - 2/14/2019  7:40 AM       Failed - Recent (12 mo) or future (30 days) visit within the authorizing provider's specialty    Patient had office visit in the last 12 months or has a visit in the next 30 days with authorizing provider or within the authorizing provider's specialty.  See \"Patient Info\" tab in inbasket, or \"Choose Columns\" in Meds & Orders section of the refill encounter.             Passed - Medication is active on med list       Passed - Patient is age 18 or older       Passed - No active pregnancy on record       Passed - No positive pregnancy test in last 12 months        Routing refill request to provider for review/approval because:  Patient needs to be seen because it has been more than 1 year since last office visit.    Allyssa Campos RN          "

## 2019-04-01 ENCOUNTER — TELEPHONE (OUTPATIENT)
Dept: FAMILY MEDICINE | Facility: CLINIC | Age: 49
End: 2019-04-01

## 2019-04-01 DIAGNOSIS — F41.1 GAD (GENERALIZED ANXIETY DISORDER): ICD-10-CM

## 2019-04-01 RX ORDER — CITALOPRAM HYDROBROMIDE 20 MG/1
20 TABLET ORAL DAILY
Qty: 30 TABLET | Refills: 0 | Status: SHIPPED | OUTPATIENT
Start: 2019-04-01 | End: 2019-04-30

## 2019-04-01 NOTE — TELEPHONE ENCOUNTER
Spoke with pharmacy, patient does not have 90 day supply sent on 2/14/19- this was broke into 30 day supply but missing refill.  30 day prescription sent to pharmacy.   Estella Tamayo RN

## 2019-04-01 NOTE — TELEPHONE ENCOUNTER
Next 5 appointments (look out 90 days)    May 01, 2019  7:20 AM CDT  PHYSICAL with Mehnaz Cabrera MD  [facility] ([facility]) [address] [facility]  [place] [address]  [phone]       Bernadine Irving,

## 2019-04-01 NOTE — TELEPHONE ENCOUNTER
Reason for call: Per patient; I'll be out of my citalopram (CELEXA) 20 MG tablet a little bit before my appt on May 1st, is there anyway I can get a refill prior to my appointment.    Phone number to reach patient:  Cell number on file:    Telephone Information:   Mobile [phone]       Best Time:  Anytime    Can we leave a detailed message on this number?  YES

## 2019-04-24 ENCOUNTER — DOCUMENTATION ONLY (OUTPATIENT)
Dept: FAMILY MEDICINE | Facility: CLINIC | Age: 49
End: 2019-04-24

## 2019-04-24 NOTE — PROGRESS NOTES
This patient has overdue labs. A letter was sent on 3/19/2019 and there has been no lab appointment made. If you still want these labs done, please have your care team contact the patient to make a lab appointment. Otherwise, please have the labs discontinued and close the encounter.    Thank you,  [facility]

## 2019-05-01 ENCOUNTER — OFFICE VISIT (OUTPATIENT)
Dept: FAMILY MEDICINE | Facility: CLINIC | Age: 49
End: 2019-05-01
Payer: COMMERCIAL

## 2019-05-01 VITALS
WEIGHT: 149 LBS | DIASTOLIC BLOOD PRESSURE: 72 MMHG | BODY MASS INDEX: 26.4 KG/M2 | HEART RATE: 73 BPM | HEIGHT: 63 IN | SYSTOLIC BLOOD PRESSURE: 106 MMHG | RESPIRATION RATE: 14 BRPM | OXYGEN SATURATION: 98 % | TEMPERATURE: 97.6 F

## 2019-05-01 DIAGNOSIS — J45.20 MILD INTERMITTENT ASTHMA WITHOUT COMPLICATION: ICD-10-CM

## 2019-05-01 DIAGNOSIS — J30.9 ALLERGIC RHINITIS, UNSPECIFIED SEASONALITY, UNSPECIFIED TRIGGER: ICD-10-CM

## 2019-05-01 DIAGNOSIS — L30.9 ECZEMA, UNSPECIFIED TYPE: ICD-10-CM

## 2019-05-01 DIAGNOSIS — Z12.31 VISIT FOR SCREENING MAMMOGRAM: ICD-10-CM

## 2019-05-01 DIAGNOSIS — F41.1 GAD (GENERALIZED ANXIETY DISORDER): ICD-10-CM

## 2019-05-01 DIAGNOSIS — N94.10 DYSPAREUNIA, FEMALE: ICD-10-CM

## 2019-05-01 DIAGNOSIS — Z00.00 ROUTINE HISTORY AND PHYSICAL EXAMINATION OF ADULT: Primary | ICD-10-CM

## 2019-05-01 LAB
CHOLEST SERPL-MCNC: 120 MG/DL
HDLC SERPL-MCNC: 64 MG/DL
HIV 1+2 AB+HIV1 P24 AG SERPL QL IA: NONREACTIVE
LDLC SERPL CALC-MCNC: 46 MG/DL
NONHDLC SERPL-MCNC: 56 MG/DL
TRIGL SERPL-MCNC: 48 MG/DL

## 2019-05-01 PROCEDURE — 80061 LIPID PANEL: CPT | Performed by: FAMILY MEDICINE

## 2019-05-01 PROCEDURE — 87624 HPV HI-RISK TYP POOLED RSLT: CPT | Performed by: FAMILY MEDICINE

## 2019-05-01 PROCEDURE — G0145 SCR C/V CYTO,THINLAYER,RESCR: HCPCS | Performed by: FAMILY MEDICINE

## 2019-05-01 PROCEDURE — 99396 PREV VISIT EST AGE 40-64: CPT | Performed by: FAMILY MEDICINE

## 2019-05-01 PROCEDURE — 87389 HIV-1 AG W/HIV-1&-2 AB AG IA: CPT | Performed by: FAMILY MEDICINE

## 2019-05-01 PROCEDURE — 99213 OFFICE O/P EST LOW 20 MIN: CPT | Mod: 25 | Performed by: FAMILY MEDICINE

## 2019-05-01 PROCEDURE — 36415 COLL VENOUS BLD VENIPUNCTURE: CPT | Performed by: FAMILY MEDICINE

## 2019-05-01 RX ORDER — TRIAMCINOLONE ACETONIDE 1 MG/G
CREAM TOPICAL
Qty: 45 G | Refills: 1 | Status: SHIPPED | OUTPATIENT
Start: 2019-05-01 | End: 2021-11-10

## 2019-05-01 RX ORDER — CALCIUM CARBONATE 300MG(750)
TABLET,CHEWABLE ORAL DAILY
COMMUNITY

## 2019-05-01 RX ORDER — FLUTICASONE PROPIONATE 50 MCG
1 SPRAY, SUSPENSION (ML) NASAL DAILY
Qty: 16 G | Refills: 11 | Status: SHIPPED | OUTPATIENT
Start: 2019-05-01

## 2019-05-01 RX ORDER — CITALOPRAM HYDROBROMIDE 20 MG/1
20 TABLET ORAL DAILY
Qty: 90 TABLET | Refills: 3 | Status: SHIPPED | OUTPATIENT
Start: 2019-05-01 | End: 2020-05-13

## 2019-05-01 ASSESSMENT — ENCOUNTER SYMPTOMS
MYALGIAS: 0
HEMATURIA: 0
SHORTNESS OF BREATH: 0
DIZZINESS: 0
BREAST MASS: 0
HEADACHES: 0
WEAKNESS: 0
FEVER: 0
ARTHRALGIAS: 0
SORE THROAT: 0
NAUSEA: 0
JOINT SWELLING: 0
PARESTHESIAS: 0
EYE PAIN: 0
ABDOMINAL PAIN: 0
HEMATOCHEZIA: 0
FREQUENCY: 0
DYSURIA: 0
DIARRHEA: 0
COUGH: 0
CHILLS: 0
NERVOUS/ANXIOUS: 0
HEARTBURN: 0
PALPITATIONS: 0
CONSTIPATION: 0

## 2019-05-01 ASSESSMENT — ANXIETY QUESTIONNAIRES
6. BECOMING EASILY ANNOYED OR IRRITABLE: NOT AT ALL
IF YOU CHECKED OFF ANY PROBLEMS ON THIS QUESTIONNAIRE, HOW DIFFICULT HAVE THESE PROBLEMS MADE IT FOR YOU TO DO YOUR WORK, TAKE CARE OF THINGS AT HOME, OR GET ALONG WITH OTHER PEOPLE: NOT DIFFICULT AT ALL
GAD7 TOTAL SCORE: 2
5. BEING SO RESTLESS THAT IT IS HARD TO SIT STILL: NOT AT ALL
7. FEELING AFRAID AS IF SOMETHING AWFUL MIGHT HAPPEN: NOT AT ALL
2. NOT BEING ABLE TO STOP OR CONTROL WORRYING: SEVERAL DAYS
1. FEELING NERVOUS, ANXIOUS, OR ON EDGE: SEVERAL DAYS
3. WORRYING TOO MUCH ABOUT DIFFERENT THINGS: NOT AT ALL

## 2019-05-01 ASSESSMENT — PATIENT HEALTH QUESTIONNAIRE - PHQ9: 5. POOR APPETITE OR OVEREATING: NOT AT ALL

## 2019-05-01 ASSESSMENT — MIFFLIN-ST. JEOR: SCORE: 1267.05

## 2019-05-01 NOTE — PATIENT INSTRUCTIONS
Preventive Health Recommendations  Female Ages 40 to 49    Yearly exam:     See your health care provider every year in order to  1. Review health changes.   2. Discuss preventive care.    3. Review your medicines if your doctor prescribed any.      Get a Pap test every three years (unless you have an abnormal result and your provider advises testing more often).      If you get Pap tests with HPV test, you only need to test every 5 years, unless you have an abnormal result. You do not need a Pap test if your uterus was removed (hysterectomy) and you have not had cancer.      You should be tested each year for STDs (sexually transmitted diseases), if you're at risk.     Ask your doctor if you should have a mammogram.      Have a colonoscopy (test for colon cancer) if someone in your family has had colon cancer or polyps before age 50.       Have a cholesterol test every 5 years.       Have a diabetes test (fasting glucose) after age 45. If you are at risk for diabetes, you should have this test every 3 years.    Shots: Get a flu shot each year. Get a tetanus shot every 10 years.     Nutrition:     Eat at least 5 servings of fruits and vegetables each day.    Eat whole-grain bread, whole-wheat pasta and brown rice instead of white grains and rice.    Get adequate Calcium and Vitamin D.      Lifestyle    Exercise at least 150 minutes a week (an average of 30 minutes a day, 5 days a week). This will help you control your weight and prevent disease.    Limit alcohol to one drink per day.    No smoking.     Wear sunscreen to prevent skin cancer.    See your dentist every six months for an exam and cleaning.

## 2019-05-01 NOTE — LETTER
[address] [place] [address]    May 2, 2019    Danielle Thrasher  [address]  [facility] [address]          Dear Danielle,  Your results are normal.   Enclosed is a copy of your results.     Results for orders placed or performed in visit on 05/01/19   HIV Screening   Result Value Ref Range    HIV Antigen Antibody Combo Nonreactive NR^Nonreactive       Lipid panel reflex to direct LDL Fasting   Result Value Ref Range    Cholesterol 120 <200 mg/dL    Triglycerides 48 <150 mg/dL    HDL Cholesterol 64 >49 mg/dL    LDL Cholesterol Calculated 46 <100 mg/dL    Non HDL Cholesterol 56 <130 mg/dL       If you have any questions or concerns, please call myself or my nurse at [phone].      Sincerely,        Mehnaz Cabrera MD/pb

## 2019-05-01 NOTE — PROGRESS NOTES
SUBJECTIVE:   CC: Danielle Thrasher is an 48 year old woman  who presents for preventive health visit.     Patient also has asthma, intermittent.  Patient has had asthma for years.  Occasional wheezing and shortness of breath are triggered by allergies and relieved by albuterol. Patient also reports fluctuant skin rash for many weeks. Patient also presents for follow-up of anxiety, a problem for years.  Excessive worrry, rumination and insomnia are not accompanied by palpitations.  Symptoms are controlled by medications with no side effects. She has painful intercourse.     Healthy Habits:     Getting at least 3 servings of Calcium per day:  Yes    Bi-annual eye exam:  Yes    Dental care twice a year:  NO    Sleep apnea or symptoms of sleep apnea:  Daytime drowsiness    Diet:  Other    Frequency of exercise:  4-5 days/week    Duration of exercise:  Greater than 60 minutes    Taking medications regularly:  Yes    Medication side effects:  None    PHQ-2 Total Score: 2    Additional concerns today:  Yes          Pain with intercourse, rash on back of left thigh    Today's PHQ-2 Score:   PHQ-2 (  [employer]) 2019   Q1: Little interest or pleasure in doing things 1   Q2: Feeling down, depressed or hopeless 1   PHQ-2 Score 2   Q1: Little interest or pleasure in doing things Several days   Q2: Feeling down, depressed or hopeless Several days   PHQ-2 Score 2       Abuse: Current or Past(Physical, Sexual or Emotional)- No  Do you feel safe in your environment? Yes    Social History     Tobacco Use     Smoking status: Never Smoker     Smokeless tobacco: Never Used     Tobacco comment: non-smoking household   Substance Use Topics     Alcohol use: Yes     Alcohol/week: 1.5 oz     Types: 3 Standard drinks or equivalent per week     Comment: glass of wine 2-3 times weekly with 1 liquor drink on the weekend         Alcohol Use 2019   Prescreen: >3 drinks/day or >7 drinks/week? No   Prescreen: >3 drinks/day or >7  drinks/week? -       Reviewed orders with patient.  Reviewed health maintenance and updated orders accordingly - Yes  Patient Active Problem List   Diagnosis     Allergic rhinitis     Patellofemoral pain syndrome     CARDIOVASCULAR SCREENING; LDL GOAL LESS THAN 160     BRIDGET (generalized anxiety disorder)     Intermittent asthma     Dysmenorrhea     Past Surgical History:   Procedure Laterality Date     EXCISE GANGLION WRIST      LT     LASIK       TONSILLECTOMY & ADENOIDECTOMY         Social History     Tobacco Use     Smoking status: Never Smoker     Smokeless tobacco: Never Used     Tobacco comment: non-smoking household   Substance Use Topics     Alcohol use: Yes     Alcohol/week: 1.5 oz     Types: 3 Standard drinks or equivalent per week     Comment: glass of wine 2-3 times weekly with 1 liquor drink on the weekend     Family History   Problem Relation Age of Onset     Breast Cancer Mother 56     Cancer Mother         skin     Cancer Father         skin     Cancer Maternal Grandfather         liver     Diabetes Maternal Grandfather      Heart Disease Maternal Grandfather         questionable MI     Cancer Maternal Grandmother         stomach     C.A.D. Paternal Grandfather 70        MI     Hypertension No family hx of      Cerebrovascular Disease No family hx of          Current Outpatient Medications   Medication Sig Dispense Refill     acetaminophen (TYLENOL) 500 MG tablet Take 1-2 tablets by mouth every 6 hours as needed for pain and fever. 100 tablet 11     albuterol (PROAIR HFA, PROVENTIL HFA, VENTOLIN HFA) 108 (90 BASE) MCG/ACT inhaler Inhale 2 puffs into the lungs every 4 hours as needed for shortness of breath / dyspnea or wheezing 1 Inhaler 5     Ascorbic Acid (VITAMIN C PO) Take 500 mg by mouth       calcium carbonate (OS-JACKSON 500 MG [place]. CA) 500 MG tablet Take 500 mg by mouth daily       cetirizine (ZYRTEC) 10 MG tablet Take 1 tablet by mouth daily. 90 tablet 3     citalopram (CELEXA) 20 MG tablet Take  1 tablet (20 mg) by mouth daily 90 tablet 3     fluticasone (FLONASE) 50 MCG/ACT nasal spray Spray 1 spray into both nostrils daily 16 g 11     levonorgestrel (MIRENA) 20 MCG/24HR IUD 1 each (20 mcg) by Intrauterine route once for 1 dose 1 each 0     Magnesium 400 MG TABS Take by mouth daily       Multiple Vitamin (MULTI-VITAMIN) per tablet Take 1 tablet by mouth daily. 90 tablet 3     Omega-3 Fatty Acids (OMEGA-3 FISH OIL PO) Take by mouth daily       triamcinolone (KENALOG) 0.1 % external cream Apply to affected area sparingly twice daily as needed 45 g 1     UNABLE TO FIND MEDICATION NAME: Tumeric       No Known Allergies    Mammogram Screening: Patient under age 50, mutual decision reflected in health maintenance.      Pertinent mammograms are reviewed under the imaging tab.  History of abnormal Pap smear: NO - age 30-65 PAP every 5 years with negative HPV co-testing recommended  PAP / HPV 9/24/2014 4/6/2011 3/4/2010   PAP NIL NIL ASC-US(A)     Reviewed and updated as needed this visit by clinical staff  Tobacco  Allergies  Meds  Problems  Med Hx  Surg Hx  Fam Hx  Soc Hx          Reviewed and updated as needed this visit by Provider  Tobacco  Meds  Problems  Med Hx  Surg Hx  Fam Hx  Soc Hx       Past Medical History:   Diagnosis Date     Allergic rhinitis     mold     Major depressive disorder, recurrent episode (H) 2009     Mild persistent asthma      Patellofemoral pain syndrome      Tear meniscus knee 1998    left      Review of Systems   Constitutional: Negative for chills and fever.   HENT: Positive for congestion. Negative for ear pain, hearing loss and sore throat.    Eyes: Negative for pain and visual disturbance.   Respiratory: Negative for cough and shortness of breath.    Cardiovascular: Negative for chest pain, palpitations and peripheral edema.   Gastrointestinal: Negative for abdominal pain, constipation, diarrhea, heartburn, hematochezia and nausea.   Breasts:  Positive for  "tenderness. Negative for breast mass and discharge.   Genitourinary: Negative for dysuria, frequency, genital sores, hematuria, pelvic pain, urgency, vaginal bleeding and vaginal discharge.   Musculoskeletal: Negative for arthralgias, joint swelling and myalgias.   Skin: Positive for rash.   Neurological: Negative for dizziness, weakness, headaches and paresthesias.   Psychiatric/Behavioral: Negative for mood changes. The patient is not nervous/anxious.      OBJECTIVE:   /72   Pulse 73   Temp 97.6  F (36.4  C) (Oral)   Resp 14   Ht 1.588 m (5' 2.5\")   Wt 67.6 kg (149 lb)   SpO2 98%   Breastfeeding? No   BMI 26.82 kg/m    Physical Exam  GENERAL: healthy, alert and no distress  EYES: Eyes grossly normal to inspection, PERRL and conjunctivae and sclerae normal  HENT: ear canals and TM's normal, nose and mouth without ulcers or lesions  NECK: no adenopathy, no asymmetry, masses, or scars and thyroid normal to palpation  RESP: lungs clear to auscultation - no rales, rhonchi or wheezes  BREAST: normal without masses, tenderness or nipple discharge and no palpable axillary masses or adenopathy  CV: regular rate and rhythm, normal S1 S2, no S3 or S4, no murmur, click or rub, no peripheral edema   ABDOMEN: soft, nontender, no hepatosplenomegaly, no masses and bowel sounds normal   (female): normal female external genitalia, normal urethral meatus, vaginal mucosa pink, moist, well rugated, and normal cervix/adnexa/uterus without masses or discharge  MS: no gross musculoskeletal defects noted, no edema  SKIN: red excoriated rash on left posterior thigh extending to groin   NEURO: Normal strength and tone, mentation intact and speech normal  PSYCH: mentation appears normal, affect normal/bright    Diagnostic Test Results:  Results for orders placed or performed in visit on 03/05/18   Beta HCG qual IFA urine   Result Value Ref Range    Beta HCG Qual IFA Urine Negative NEG^Negative          ASSESSMENT/PLAN: "   (Z00.00) Routine history and physical examination of adult  (primary encounter diagnosis)  Plan: HIV Screening, Lipid panel reflex to direct LDL        Fasting, Pap imaged thin layer screen with HPV         - recommended age 30 - 65 years (select HPV         order below), HPV High Risk Types DNA Cervical          (J45.20) Mild intermittent asthma without complication  Comment: Well controlled with medications without side effects.     (J30.9) Allergic rhinitis, unspecified seasonality, unspecified trigger  Plan: fluticasone (FLONASE) 50 MCG/ACT nasal spray,         OFFICE/OUTPT VISIT,EST,LEVL III        Discussed risks and benefits of this medication. Call or return to clinic as needed if these symptoms worsen or fail to improve as anticipated.     (L30.9) Eczema, unspecified type  Plan: triamcinolone (KENALOG) 0.1 % external cream,         DERMATOLOGY REFERRAL, OFFICE/OUTPT         VISIT,EST,LEVL III        Discussed risks and benefits of this medication.     (F41.1) BRIDGET (generalized anxiety disorder)  Comment: Well controlled with medications without side effects.   Plan: citalopram (CELEXA) 20 MG tablet          (N94.10) Dyspareunia, female  Plan: OFFICE/OUTPT VISIT,EST,LEVL III        Discussed over-the-counter lubricants and anticipatory guidance; offered gynecology referral        COUNSELING:  Reviewed preventive health counseling, as reflected in patient instructions  Special attention given to:        Regular exercise       Healthy diet/nutrition       Contraception       Osteoporosis Prevention/Bone Health       HIV screeninx in teen years, 1x in adult years, and at intervals if high risk       (Piedad)menopause management       The ASCVD Risk score (Jos GOOD Jr., et al., 2013) failed to calculate for the following reasons:    Cannot find a previous HDL lab    Cannot find a previous total cholesterol lab    BP Readings from Last 1 Encounters:   19 106/72     Estimated body mass index is 26.82 kg/m   "as calculated from the following:    Height as of this encounter: 1.588 m (5' 2.5\").    Weight as of this encounter: 67.6 kg (149 lb).    Weight management plan: Discussed healthy diet and exercise guidelines     reports that she has never smoked. She has never used smokeless tobacco.      Counseling Resources:  ATP IV Guidelines  Pooled Cohorts Equation Calculator  Breast Cancer Risk Calculator  FRAX Risk Assessment  ICSI Preventive Guidelines  Dietary Guidelines for Americans, 2010  USDA's MyPlate  ASA Prophylaxis  Lung CA Screening    Mehnaz Cabrera MD  [facility]  "

## 2019-05-01 NOTE — LETTER
My Asthma Action Plan  Name: Danielle Thrasher   YOB: 1970  Date: 5/1/2019   My doctor: Mehnaz Cabrera MD   My clinic: [facility]        My Control Medicine: None  My Rescue Medicine: Albuterol (Proair/Ventolin/Proventil) inhaler     My Asthma Severity: intermittent  Avoid your asthma triggers: upper respiratory infections               GREEN ZONE   Good Control    I feel good    No cough or wheeze    Can work, sleep and play without asthma symptoms       Take your asthma control medicine every day.     1. If exercise triggers your asthma, take your rescue medication    15 minutes before exercise or sports, and    During exercise if you have asthma symptoms  2. Spacer to use with inhaler: If you have a spacer, make sure to use it with your inhaler             YELLOW ZONE Getting Worse  I have ANY of these:    I do not feel good    Cough or wheeze    Chest feels tight    Wake up at night   1. Keep taking your Green Zone medications  2. Start taking your rescue medicine:    every 20 minutes for up to 1 hour. Then every 4 hours for 24-48 hours.  3. If you stay in the Yellow Zone for more than 12-24 hours, contact your doctor.  4. If you do not return to the Green Zone in 12-24 hours or you get worse, start taking your oral steroid medicine if prescribed by your provider.           RED ZONE Medical Alert - Get Help  I have ANY of these:    I feel awful    Medicine is not helping    Breathing getting harder    Trouble walking or talking    Nose opens wide to breathe       1. Take your rescue medicine NOW  2. If your provider has prescribed an oral steroid medicine, start taking it NOW  3. Call your doctor NOW  4. If you are still in the Red Zone after 20 minutes and you have not reached your doctor:    Take your rescue medicine again and    Call 911 or go to the emergency room right away    See your regular doctor within 2 weeks of an Emergency Room or Urgent Care visit for follow-up  treatment.          Annual Reminders:  Meet with Asthma Educator,  Flu Shot in the Fall, consider Pneumonia Vaccination for patients with asthma (aged 19 and older).    Pharmacy: [id number] - [place], [place] - [address] AT [facility] [address]                      Asthma Triggers  How To Control Things That Make Your Asthma Worse    Triggers are things that make your asthma worse.  Look at the list below to help you find your triggers and what you can do about them.  You can help prevent asthma flare-ups by staying away from your triggers.      Trigger                                                          What you can do   Cigarette Smoke  Tobacco smoke can make asthma worse. Do not allow smoking in your home, car or around you.  Be sure no one smokes at a child s day care or school.  If you smoke, ask your health care provider for ways to help you quit.  Ask family members to quit too.  Ask your health care provider for a referral to Quit Plan to help you quit smoking, or call 7-588-889-PLAN.     Colds, Flu, Bronchitis  These are common triggers of asthma. Wash your hands often.  Don t touch your eyes, nose or mouth.  Get a flu shot every year.     Dust Mites  These are tiny bugs that live in cloth or carpet. They are too small to see. Wash sheets and blankets in hot water every week.   Encase pillows and mattress in dust mite proof covers.  Avoid having carpet if you can. If you have carpet, vacuum weekly.   Use a dust mask and HEPA vacuum.   Pollen and Outdoor Mold  Some people are allergic to trees, grass, or weed pollen, or molds. Try to keep your windows closed.  Limit time out doors when pollen count is high.   Ask you health care provider about taking medicine during allergy season.     Animal Dander  Some people are allergic to skin flakes, urine or saliva from pets with fur or feathers. Keep pets with fur or feathers out of your home.    If you can t keep the pet  outdoors, then keep the pet out of your bedroom.  Keep the bedroom door closed.  Keep pets off cloth furniture and away from stuffed toys.     Mice, Rats, and Cockroaches  Some people are allergic to the waste from these pests.   Cover food and garbage.  Clean up spills and food crumbs.  Store grease in the refrigerator.   Keep food out of the bedroom.   Indoor Mold  This can be a trigger if your home has high moisture. Fix leaking faucets, pipes, or other sources of water.   Clean moldy surfaces.  Dehumidify basement if it is damp and smelly.   Smoke, Strong Odors, and Sprays  These can reduce air quality. Stay away from strong odors and sprays, such as perfume, powder, hair spray, paints, smoke incense, paint, cleaning products, candles and new carpet.   Exercise or Sports  Some people with asthma have this trigger. Be active!  Ask your doctor about taking medicine before sports or exercise to prevent symptoms.    Warm up for 5-10 minutes before and after sports or exercise.     Other Triggers of Asthma  Cold air:  Cover your nose and mouth with a scarf.  Sometimes laughing or crying can be a trigger.  Some medicines and food can trigger asthma.

## 2019-05-01 NOTE — LETTER
May 8, 2019    Danielle Thrasher  [address] [address]    Dear ,  This letter is regarding your recent Pap smear (cervical cancer screening) and Human Papillomavirus (HPV) test.  We are happy to inform you that your Pap smear result is normal. Cervical cancer is closely linked with certain types of HPV. Your results showed no evidence of high-risk HPV.  Therefore we recommend you return in 5 years for your next pap smear and HPV test.  You will still need to return to the clinic every year for an annual exam and other preventive tests.  If you have additional questions regarding this result, please call our registered nurse, Marge at [phone].  Sincerely,    Mehnaz Cabrera MD/мария

## 2019-05-02 ASSESSMENT — ASTHMA QUESTIONNAIRES: ACT_TOTALSCORE: 23

## 2019-05-02 ASSESSMENT — ANXIETY QUESTIONNAIRES: GAD7 TOTAL SCORE: 2

## 2019-05-03 LAB
COPATH REPORT: NORMAL
PAP: NORMAL

## 2019-05-07 LAB
FINAL DIAGNOSIS: NORMAL
HPV HR 12 DNA CVX QL NAA+PROBE: NEGATIVE
HPV16 DNA SPEC QL NAA+PROBE: NEGATIVE
HPV18 DNA SPEC QL NAA+PROBE: NEGATIVE
SPECIMEN DESCRIPTION: NORMAL
SPECIMEN SOURCE CVX/VAG CYTO: NORMAL

## 2019-05-25 DIAGNOSIS — F41.1 GAD (GENERALIZED ANXIETY DISORDER): ICD-10-CM

## 2019-05-28 NOTE — TELEPHONE ENCOUNTER
pharmacy closed. Will call after 9    citalopram (CELEXA) 20 MG tablet 90 tablet 3 5/1/2019  No   Sig - Route: Take 1 tablet (20 mg) by mouth daily - Oral   Sent to pharmacy as: citalopram (CELEXA) 20 MG tablet   Class: E-Prescribe   Order: [id number]   E-Prescribing Status: Receipt confirmed by pharmacy (5/1/2019  7:57 AM CDT)     Shamar Bolanos CMA on 5/28/2019 at 8:05 AM

## 2019-05-29 RX ORDER — CITALOPRAM HYDROBROMIDE 20 MG/1
TABLET ORAL
Qty: 90 TABLET | Refills: 0 | OUTPATIENT
Start: 2019-05-29

## 2019-10-07 ENCOUNTER — ANCILLARY PROCEDURE (OUTPATIENT)
Dept: GENERAL RADIOLOGY | Facility: CLINIC | Age: 49
End: 2019-10-07
Attending: PEDIATRICS
Payer: COMMERCIAL

## 2019-10-07 ENCOUNTER — OFFICE VISIT (OUTPATIENT)
Dept: ORTHOPEDICS | Facility: CLINIC | Age: 49
End: 2019-10-07
Payer: COMMERCIAL

## 2019-10-07 DIAGNOSIS — S76.311A STRAIN OF RIGHT HAMSTRING, INITIAL ENCOUNTER: ICD-10-CM

## 2019-10-07 DIAGNOSIS — R07.81 RIB PAIN ON RIGHT SIDE: Primary | ICD-10-CM

## 2019-10-07 DIAGNOSIS — R07.81 RIB PAIN ON RIGHT SIDE: ICD-10-CM

## 2019-10-07 PROCEDURE — 71101 X-RAY EXAM UNILAT RIBS/CHEST: CPT | Mod: RT

## 2019-10-07 PROCEDURE — 99203 OFFICE O/P NEW LOW 30 MIN: CPT | Performed by: PEDIATRICS

## 2019-10-07 ASSESSMENT — MIFFLIN-ST. JEOR: SCORE: 1284.73

## 2019-10-07 NOTE — LETTER
10/7/2019         RE: Danielle Thrasher  [address]  [place] [address]        Dear Colleague,    Thank you for referring your patient, Danielle Thrasher, to the [facility]. Please see a copy of my visit note below.    Sports Medicine Clinic Visit    PCP: Mehnaz Cabrera    Danielle Thrasher is a 49 year old female who is seen  as a self referral, AIC presenting with right sided rib pain.     Injury: Patient was running in [place] trail race 2 weeks ago and had a fall, landed on right ribs. Thinks she may have injured her right hamstring as well during the fall.  She was feeling better until yesterday. Patient was running [id number] mile race and had to walk half of the race due to rib pain with running and deep breathing.  - No chest pain, no difficulty breathing    Location of Pain: right anterior ribs, right mid hamstring    Duration of Pain: 2 week(s), 1 day   Rating of Pain at worst: 7/10  Rating of Pain Currently: 4/10  Symptoms are better with: sitting, rest  Symptoms are worse with: running, planking (weight bearing), sleeping on right side, deep breathing, coughing   Additional Features:   Positive: paresthesias   Negative: chest pain, weakness   Other evaluation and/or treatments so far consists of: Ice  Prior History of related problems: none     Social History:  for [employer]. Running 20 miles per for for 2 years     Review of Systems  Skin: no bruising, no swelling  Musculoskeletal: as above  Neurologic: no numbness, paresthesias  Remainder of review of systems is negative including constitutional, CV, pulmonary, GI, except as noted in HPI or medical history.    Patient's current problem list, past medical and surgical history, and family history were reviewed.    Patient Active Problem List   Diagnosis     Allergic rhinitis     Patellofemoral pain syndrome     CARDIOVASCULAR SCREENING; LDL GOAL LESS THAN 160     BRIDGET (generalized  "anxiety disorder)     Intermittent asthma     Dysmenorrhea     Past Medical History:   Diagnosis Date     Allergic rhinitis     mold     Major depressive disorder, recurrent episode (H) 2009     Mild persistent asthma      Patellofemoral pain syndrome      Tear meniscus knee 1998    left     Past Surgical History:   Procedure Laterality Date     EXCISE GANGLION WRIST      LT     LASIK       TONSILLECTOMY & ADENOIDECTOMY       Family History   Problem Relation Age of Onset     Breast Cancer Mother 56     Cancer Mother         skin     Cancer Father         skin     Cancer Maternal Grandfather         liver     Diabetes Maternal Grandfather      Heart Disease Maternal Grandfather         questionable MI     Cancer Maternal Grandmother         stomach     C.A.D. Paternal Grandfather 70        MI     Hypertension No family hx of      Cerebrovascular Disease No family hx of        Objective  /60   Ht 1.588 m (5' 2.5\")   Wt 69.9 kg (154 lb)   BMI 27.72 kg/m       GENERAL APPEARANCE: healthy, alert and no distress   GAIT: NORMAL  SKIN: no suspicious lesions or rashes  HEENT: Sclera clear, anicteric  CV: good peripheral pulses  - regular rate and rhythm, no murmurs  RESP: Breathing not labored  - CTAB  NEURO: Normal strength and tone, mentation intact and speech normal  PSYCH:  mentation appears normal and affect normal/bright    Thoracic Back and Chest exam:    Inspection:     no visible deformity in the low back       normal skin       normal vascular       normal lymphatic       no asymmetry    Posture:      Normal    Tender:     Right anterior and lateral rib pain    ROM:      Full Shoulder ROM, some pain with end ROM      Radiology  I ordered, visualized and reviewed these images with the patient  Xr Ribs & Chest Rt G/e 3vw  Result Date: 10/7/2019  XR RIBS & CHEST RT 3VW 10/7/2019 3:27 PM HISTORY: Rib pain on right side   IMPRESSION: No apparent right rib displaced fracture. The lungs appear clear. No apparent " pneumothorax or pleural effusion. RHETT HERNANDEZ MD    Assessment:  1. Rib pain on right side    2. Strain of right hamstring, initial encounter      No clear displaced rib fracture.  We discussed symptomatic care including rest, ice, over-the-counter medications.  Follow-up in 1 month as needed, could consider repeat x-rays or physical therapy.  We discussed concerning signs and symptoms that would warrant more urgent evaluation.    Plan:  - Today's Plan of Care:  Rest  Topical Treatments: Ice  Over the counter medication: Patient's preferred OTC medications    -We also discussed other future treatment options:  Rehab: Physical Therapy: [facility] - [phone]    Follow Up: 1 month and as needed    Concerning signs and symptoms were reviewed.  The patient expressed understanding of this management plan and all questions were answered at this time.    Adelaida Lazaro MD [facility]  Primary Care Sports Medicine  [facility]    Again, thank you for allowing me to participate in the care of your patient.        Sincerely,        Adelaida Lazaro MD

## 2019-10-07 NOTE — PATIENT INSTRUCTIONS
Plan:  - Today's Plan of Care:  Rest  Topical Treatments: Ice  Over the counter medication: Patient's preferred OTC medications    -We also discussed other future treatment options:  Rehab: Physical Therapy: [facility] - [phone]    Follow Up: 1 month and as needed    If you have any further questions for your physician or physician s care team you can call [phone] and use option 3 to leave a voice message. Calls received during business hours will be returned same day.

## 2019-10-08 VITALS
BODY MASS INDEX: 27.29 KG/M2 | SYSTOLIC BLOOD PRESSURE: 105 MMHG | DIASTOLIC BLOOD PRESSURE: 60 MMHG | WEIGHT: 154 LBS | HEIGHT: 63 IN

## 2020-05-13 DIAGNOSIS — F41.1 GAD (GENERALIZED ANXIETY DISORDER): ICD-10-CM

## 2020-05-13 RX ORDER — CITALOPRAM HYDROBROMIDE 20 MG/1
TABLET ORAL
Qty: 30 TABLET | Refills: 0 | Status: SHIPPED | OUTPATIENT
Start: 2020-05-13 | End: 2020-06-08

## 2020-05-13 NOTE — LETTER
May 14, 2020          Danielle Thrasher,  [address] [facility] [address]            Dear Danielle,       Your provider has sent a  tadeo refill of citalopram (CELEXA) 20 MG tablet . You are due for an appointment for further refills. We are currently only able to see select in person visits. We ask that you schedule a telephone visit, video visit or evisit (through [employer]) with your provider.  Please contact the clinic to schedule an appointment for further refills.         Sincerely,           [facility]

## 2020-06-08 ENCOUNTER — VIRTUAL VISIT (OUTPATIENT)
Dept: FAMILY MEDICINE | Facility: CLINIC | Age: 50
End: 2020-06-08
Payer: COMMERCIAL

## 2020-06-08 DIAGNOSIS — M25.551 HIP PAIN, RIGHT: Primary | ICD-10-CM

## 2020-06-08 DIAGNOSIS — Z12.31 VISIT FOR SCREENING MAMMOGRAM: ICD-10-CM

## 2020-06-08 DIAGNOSIS — F41.1 GAD (GENERALIZED ANXIETY DISORDER): ICD-10-CM

## 2020-06-08 DIAGNOSIS — J45.20 MILD INTERMITTENT ASTHMA WITHOUT COMPLICATION: ICD-10-CM

## 2020-06-08 PROCEDURE — 96127 BRIEF EMOTIONAL/BEHAV ASSMT: CPT | Performed by: FAMILY MEDICINE

## 2020-06-08 PROCEDURE — 99214 OFFICE O/P EST MOD 30 MIN: CPT | Mod: 95 | Performed by: FAMILY MEDICINE

## 2020-06-08 RX ORDER — CITALOPRAM HYDROBROMIDE 20 MG/1
20 TABLET ORAL DAILY
Qty: 90 TABLET | Refills: 3 | Status: SHIPPED | OUTPATIENT
Start: 2020-06-08 | End: 2020-07-20

## 2020-06-08 ASSESSMENT — ANXIETY QUESTIONNAIRES
2. NOT BEING ABLE TO STOP OR CONTROL WORRYING: SEVERAL DAYS
GAD7 TOTAL SCORE: 9
6. BECOMING EASILY ANNOYED OR IRRITABLE: SEVERAL DAYS
1. FEELING NERVOUS, ANXIOUS, OR ON EDGE: SEVERAL DAYS
IF YOU CHECKED OFF ANY PROBLEMS ON THIS QUESTIONNAIRE, HOW DIFFICULT HAVE THESE PROBLEMS MADE IT FOR YOU TO DO YOUR WORK, TAKE CARE OF THINGS AT HOME, OR GET ALONG WITH OTHER PEOPLE: SOMEWHAT DIFFICULT
7. FEELING AFRAID AS IF SOMETHING AWFUL MIGHT HAPPEN: SEVERAL DAYS
5. BEING SO RESTLESS THAT IT IS HARD TO SIT STILL: SEVERAL DAYS
3. WORRYING TOO MUCH ABOUT DIFFERENT THINGS: NEARLY EVERY DAY

## 2020-06-08 ASSESSMENT — PATIENT HEALTH QUESTIONNAIRE - PHQ9: 5. POOR APPETITE OR OVEREATING: SEVERAL DAYS

## 2020-06-08 NOTE — LETTER
My Asthma Action Plan    Name: Danielle Thrasher   YOB: 1970  Date: 6/8/2020   My doctor: Mehnaz Cabrera MD   My clinic: [facility]        My Rescue Medicine:   Albuterol inhaler (Proair/Ventolin/Proventil HFA)  2-4 puffs EVERY 4 HOURS as needed. Use a spacer if recommended by your provider.   My Asthma Severity:   Intermittent / Exercise Induced  Know your asthma triggers: upper respiratory infections             GREEN ZONE   Good Control    I feel good    No cough or wheeze    Can work, sleep and play without asthma symptoms       Take your asthma control medicine every day.     1. If exercise triggers your asthma, take your rescue medication    15 minutes before exercise or sports, and    During exercise if you have asthma symptoms  2. Spacer to use with inhaler: If you have a spacer, make sure to use it with your inhaler             YELLOW ZONE Getting Worse  I have ANY of these:    I do not feel good    Cough or wheeze    Chest feels tight    Wake up at night   1. Keep taking your Green Zone medications  2. Start taking your rescue medicine:    every 20 minutes for up to 1 hour. Then every 4 hours for 24-48 hours.  3. If you stay in the Yellow Zone for more than 12-24 hours, contact your doctor.  4. If you do not return to the Green Zone in 12-24 hours or you get worse, start taking your oral steroid medicine if prescribed by your provider.           RED ZONE Medical Alert - Get Help  I have ANY of these:    I feel awful    Medicine is not helping    Breathing getting harder    Trouble walking or talking    Nose opens wide to breathe       1. Take your rescue medicine NOW  2. If your provider has prescribed an oral steroid medicine, start taking it NOW  3. Call your doctor NOW  4. If you are still in the Red Zone after 20 minutes and you have not reached your doctor:    Take your rescue medicine again and    Call 911 or go to the emergency room right away    See your regular  doctor within 2 weeks of an Emergency Room or Urgent Care visit for follow-up treatment.          Annual Reminders:  Meet with Asthma Educator,  Flu Shot in the Fall, consider Pneumonia Vaccination for patients with asthma (aged 19 and older).    Pharmacy: [id number] - [place] - [address] AT [facility]    Electronically signed by Mehnaz Cabrera MD   Date: 06/08/20                    Asthma Triggers  How To Control Things That Make Your Asthma Worse    Triggers are things that make your asthma worse.  Look at the list below to help you find your triggers and   what you can do about them. You can help prevent asthma flare-ups by staying away from your triggers.      Trigger                                                          What you can do   Cigarette Smoke  Tobacco smoke can make asthma worse. Do not allow smoking in your home, car or around you.  Be sure no one smokes at a child s day care or school.  If you smoke, ask your health care provider for ways to help you quit.  Ask family members to quit too.  Ask your health care provider for a referral to Quit Plan to help you quit smoking, or call 2-510-282-PLAN.     Colds, Flu, Bronchitis  These are common triggers of asthma. Wash your hands often.  Don t touch your eyes, nose or mouth.  Get a flu shot every year.     Dust Mites  These are tiny bugs that live in cloth or carpet. They are too small to see. Wash sheets and blankets in hot water every week.   Encase pillows and mattress in dust mite proof covers.  Avoid having carpet if you can. If you have carpet, vacuum weekly.   Use a dust mask and HEPA vacuum.   Pollen and Outdoor Mold  Some people are allergic to trees, grass, or weed pollen, or molds. Try to keep your windows closed.  Limit time out doors when pollen count is high.   Ask you health care provider about taking medicine during allergy season.     Animal Dander  Some people are allergic to skin  flakes, urine or saliva from pets with fur or feathers. Keep pets with fur or feathers out of your home.    If you can t keep the pet outdoors, then keep the pet out of your bedroom.  Keep the bedroom door closed.  Keep pets off cloth furniture and away from stuffed toys.     Mice, Rats, and Cockroaches  Some people are allergic to the waste from these pests.   Cover food and garbage.  Clean up spills and food crumbs.  Store grease in the refrigerator.   Keep food out of the bedroom.   Indoor Mold  This can be a trigger if your home has high moisture. Fix leaking faucets, pipes, or other sources of water.   Clean moldy surfaces.  Dehumidify basement if it is damp and smelly.   Smoke, Strong Odors, and Sprays  These can reduce air quality. Stay away from strong odors and sprays, such as perfume, powder, hair spray, paints, smoke incense, paint, cleaning products, candles and new carpet.   Exercise or Sports  Some people with asthma have this trigger. Be active!  Ask your doctor about taking medicine before sports or exercise to prevent symptoms.    Warm up for 5-10 minutes before and after sports or exercise.     Other Triggers of Asthma  Cold air:  Cover your nose and mouth with a scarf.  Sometimes laughing or crying can be a trigger.  Some medicines and food can trigger asthma.

## 2020-06-08 NOTE — PROGRESS NOTES
"Danielle Thrasher is a 49 year old female who is being evaluated via a billable telephone visit.      The patient has been notified of following:     \"This telephone visit will be conducted via a call between you and your physician/provider. We have found that certain health care needs can be provided without the need for a physical exam.  This service lets us provide the care you need with a short phone conversation.  If a prescription is necessary we can send it directly to your pharmacy.  If lab work is needed we can place an order for that and you can then stop by our lab to have the test done at a later time.    Telephone visits are billed at different rates depending on your insurance coverage. During this emergency period, for some insurers they may be billed the same as an in-person visit.  Please reach out to your insurance provider with any questions.    If during the course of the call the physician/provider feels a telephone visit is not appropriate, you will not be charged for this service.\"    Patient has given verbal consent for Telephone visit?  Yes    What phone number would you like to be contacted at? [phone]    How would you like to obtain your AVS? Mail a copy    Subjective     Danielle Thrasher is a 49 year old female who presents via phone visit today for the following health issues:    HPI  Anxiety Follow-Up    How are you doing with your anxiety since your last visit? Worsened     Are you having other symptoms that might be associated with anxiety? Yes:  COVID-19 in community    Have you had a significant life event? Right Quad injury    Are you feeling depressed? Yes:  slight    Do you have any concerns with your use of alcohol or other drugs? No    Social History     Tobacco Use     Smoking status: Never Smoker     Smokeless tobacco: Never Used     Tobacco comment: non-smoking household   Substance Use Topics     Alcohol use: Yes     Alcohol/week: 2.5 standard drinks     Types: 3 " Standard drinks or equivalent per week     Comment: glass of wine 2-3 times weekly with 1 liquor drink on the weekend     Drug use: No     BRIDGET-7 SCORE 11/10/2016 5/1/2019 6/8/2020   Total Score 4 2 9     PHQ 10/8/2015 11/2/2016 11/10/2016   PHQ-9 Total Score 1 3 4   Q9: Thoughts of better off dead/self-harm past 2 weeks Not at all Not at all Not at all     BRIDGET-7  6/8/2020   1. Feeling nervous, anxious, or on edge 1   2. Not being able to stop or control worrying 1   3. Worrying too much about different things 3   4. Trouble relaxing 1   5. Being so restless that it is hard to sit still 1   6. Becoming easily annoyed or irritable 1   7. Feeling afraid, as if something awful might happen 1   BRIDGET-7 Total Score 9   If you checked any problems, how difficult have they made it for you to do your work, take care of things at home, or get along with other people? Somewhat difficult         How many servings of fruits and vegetables do you eat daily?  4 or more    On average, how many sweetened beverages do you drink each day (Examples: soda, juice, sweet tea, etc.  Do NOT count diet or artificially sweetened beverages)?   0    How many days per week do you exercise enough to make your heart beat faster? 3 or less    How many minutes a day do you exercise enough to make your heart beat faster? 10 - 19    How many days per week do you miss taking your medication? 0                Reviewed and updated as needed this visit by Provider  Tobacco  Allergies  Meds  Problems  Med Hx  Surg Hx  Fam Hx         Review of Systems   CONSTITUTIONAL:POSITIVE  for weight gain  RESP: NEGATIVE for significant cough or SOB  : NEGATIVE for frequency, dysuria, or hematuria  MUSCULOSKELETAL: right hip pain   NEURO: NEGATIVE for weakness, dizziness or paresthesias  PSYCHIATRIC: HX depression       Objective   Reported vitals:  There were no vitals taken for this visit.   healthy, alert and no distress  PSYCH: Alert and oriented times 3;  coherent speech, normal   rate and volume, able to articulate logical thoughts, able   to abstract reason, no tangential thoughts, no hallucinations   or delusions  Her affect is normal  RESP: No cough, no audible wheezing, able to talk in full sentences  Remainder of exam unable to be completed due to telephone visits    Diagnostic Test Results:  Labs reviewed in Epic  No results found for this or any previous visit (from the past 24 hour(s)).        Assessment/Plan:  1. Hip pain, right  -evaluation limited by virtual visit type; Differential diagnoses would include: ligamentous strain, labral tear, IT band syndrome/bursitis, less likely would be degenerative joint disease or radicular pain   - [facility] REFERRAL; Future  - Orthopedic & Spine  Referral; Future  -Over the counter pain medication as needed, ice or heat as she finds helpful, avoidance of aggravating activities, and return for persistence for consideration of further imaging or referral.     2. BRIDGET (generalized anxiety disorder)  -Well controlled with medications without side effects.   - citalopram (CELEXA) 20 MG tablet; Take 1 tablet (20 mg) by mouth daily  Dispense: 90 tablet; Refill: 3    3. Mild intermittent asthma without complication  -Well controlled with medications without side effects.     4. Visit for screening mammogram  - MA SCREENING DIGITAL BILAT - Future  (s+30); Future    Return in about 1 year (around 6/8/2021) for physical, asthma.      Phone call duration:  16 minutes    Mehnaz Cabrera MD

## 2020-06-09 ASSESSMENT — ANXIETY QUESTIONNAIRES: GAD7 TOTAL SCORE: 9

## 2020-06-09 ASSESSMENT — ASTHMA QUESTIONNAIRES: ACT_TOTALSCORE: 24

## 2020-06-17 ENCOUNTER — VIRTUAL VISIT (OUTPATIENT)
Dept: ORTHOPEDICS | Facility: CLINIC | Age: 50
End: 2020-06-17
Attending: FAMILY MEDICINE
Payer: COMMERCIAL

## 2020-06-17 VITALS — WEIGHT: 155 LBS | BODY MASS INDEX: 27.46 KG/M2 | HEIGHT: 63 IN

## 2020-06-17 DIAGNOSIS — M25.551 HIP PAIN, RIGHT: ICD-10-CM

## 2020-06-17 PROCEDURE — 99213 OFFICE O/P EST LOW 20 MIN: CPT | Mod: 95 | Performed by: FAMILY MEDICINE

## 2020-06-17 ASSESSMENT — MIFFLIN-ST. JEOR: SCORE: 1289.27

## 2020-06-17 NOTE — LETTER
"    2020         RE: Danielle Thrasher  [address] [facility] [address]        Dear Colleague,    Thank you for referring your patient, Danielle Thrasher, to the [facility]. Please see a copy of my visit note below.    Danielle Thrasher is a 49 year old female who is being evaluated via a billable video visit.      The patient has been notified of following:     \"This video visit will be conducted via a call between you and your physician/provider. We have found that certain health care needs can be provided without the need for an in-person physical exam.  This service lets us provide the care you need with a video conversation.  If a prescription is necessary we can send it directly to your pharmacy.  If lab work is needed we can place an order for that and you can then stop by our lab to have the test done at a later time.    Video visits are billed at different rates depending on your insurance coverage.  Please reach out to your insurance provider with any questions.    If during the course of the call the physician/provider feels a video visit is not appropriate, you will not be charged for this service.\"    Patient has given verbal consent for Video visit? Yes    How would you like to obtain your AVS? Mail a copy  Patient would like the video invitation sent by: Text to cell phone: [phone]    Will anyone else be joining your video visit? No        Danielle Thrasher  :  1970  DOS: 2020  MRN: [id number]    Sports Medicine Clinic Visit    PCP: Mehnaz Cabrera    Danielle Thrasher is a 49 year old female who is seen in consultation at the request of  Mehnaz Cabrera M.D. presenting with acute right hip pain.    Injury: Gradual onset of right hip pain that initially started with running ~ 6 weeks ago.  Pain located over right deep anterior hip, radiating to proximal anterior thigh.  Additional Features:  Positive: weakness.  " "Symptoms are better with Ibuprofen and Rest.  Symptoms are worse with: running, ascending stairs, hip flexion.  Other evaluation and/or treatments so far consists of: Ibuprofen and Rest, stretching.  Recent imaging completed: No recent imaging completed.  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    Past Medical History:   Diagnosis Date     Allergic rhinitis     mold     Major depressive disorder, recurrent episode (H) 2009     Mild persistent asthma      Patellofemoral pain syndrome      Tear meniscus knee 1998    left     Past Surgical History:   Procedure Laterality Date     EXCISE GANGLION WRIST      LT     LASIK       TONSILLECTOMY & ADENOIDECTOMY       Family History   Problem Relation Age of Onset     Breast Cancer Mother 56     Cancer Mother         skin     Cancer Father         skin     Cancer Maternal Grandfather         liver     Diabetes Maternal Grandfather      Heart Disease Maternal Grandfather         questionable MI     Cancer Maternal Grandmother         stomach     C.A.D. Paternal Grandfather 70        MI     Hypertension No family hx of      Cerebrovascular Disease No family hx of        Objective  Ht 1.588 m (5' 2.5\")   Wt 70.3 kg (155 lb)   BMI 27.90 kg/m        General: healthy, alert and in no distress      HEENT: no scleral icterus    Resp: normal respiratory effort without conversational dyspnea     Psych: normal mood and affect      Left hip exam    ROM:       Reports limited terminal flexion and IR>ER     Strength:        No reported strength issues    Provider Notes:  This pain came on after a mild increase in mileage recently, but has been running in this range for 3 yrs.  No known injury.  No radiating pain.  " Pain localized to groin area and somewhat in lateral hip.  No prior injuries to the hip that she can recall.    Radiology  No imaging available to review    Assessment:  1. Hip pain, right        Plan:  Discussed the assessment with the patient.  Follow up: prn based on clinical progress  Pain does sound like it localizes to her hip joint, but came on abruptly  Lower suspicion for stress fracture  Possible somatic dysfunction, OA flare, cannot r/o labral tear, could be hip flexor tendinitis as well  Start PT for now, can confer with PT after working with her  If improving we can continue this, if there is a plateau, worsening pain, or concern from therapist advise office visit and imaging  Use of short 1-2 wk course of NSAIDs reviewed, dosing and precautions reviewed if utilized  Low impact activity and core building strategies reviewed  We discussed modified progressive pain-free activity as tolerated  Home handouts provided and supportive care reviewed  All questions were answered today  Contact us with additional questions or concerns  Signs and sx of concern reviewed    Thanks very much for sending this nice lady to us, I will keep you updated with her progress      Roly Burr, DO, CAQ  Primary Care Sports Medicine  [facility]           Disclaimer: This note consists of symbols derived from keyboarding, dictation and/or voice recognition software. As a result, there may be errors in the script that have gone undetected. Please consider this when interpreting information found in this chart.  Video-Visit Details    Type of service:  Video Visit    Video Start Time: 4:43pm  Video End Time: 5:04 pm    Originating Location (pt. Location): Home    Distant Location (provider location):  [facility]     Platform used for Video Visit: [employer]              Again, thank you for allowing me to participate in the care of your patient.        Sincerely,        Roly  Karl Burr, DO

## 2020-06-17 NOTE — PROGRESS NOTES
"Danielle Thrasher is a 49 year old female who is being evaluated via a billable video visit.      The patient has been notified of following:     \"This video visit will be conducted via a call between you and your physician/provider. We have found that certain health care needs can be provided without the need for an in-person physical exam.  This service lets us provide the care you need with a video conversation.  If a prescription is necessary we can send it directly to your pharmacy.  If lab work is needed we can place an order for that and you can then stop by our lab to have the test done at a later time.    Video visits are billed at different rates depending on your insurance coverage.  Please reach out to your insurance provider with any questions.    If during the course of the call the physician/provider feels a video visit is not appropriate, you will not be charged for this service.\"    Patient has given verbal consent for Video visit? Yes    How would you like to obtain your AVS? Mail a copy  Patient would like the video invitation sent by: Text to cell phone: [phone]    Will anyone else be joining your video visit? No        Danielle Thrasher  :  1970  DOS: 2020  MRN: [id number]    Sports Medicine Clinic Visit    PCP: Mehnaz Cabrera    Danielle Thrasher is a 49 year old female who is seen in consultation at the request of  Mehnaz Cabrera M.D. presenting with acute right hip pain.    Injury: Gradual onset of right hip pain that initially started with running ~ 6 weeks ago.  Pain located over right deep anterior hip, radiating to proximal anterior thigh.  Additional Features:  Positive: weakness.  Symptoms are better with Ibuprofen and Rest.  Symptoms are worse with: running, ascending stairs, hip flexion.  Other evaluation and/or treatments so far consists of: Ibuprofen and Rest, stretching.  Recent imaging completed: No recent imaging completed.  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    Past Medical History:   Diagnosis Date     Allergic rhinitis     mold     Major depressive disorder, recurrent episode (H) 2009     Mild persistent asthma      Patellofemoral pain syndrome      Tear meniscus knee 1998    left     Past Surgical History:   Procedure Laterality Date     EXCISE GANGLION WRIST      LT     LASIK       TONSILLECTOMY & ADENOIDECTOMY       Family History   Problem Relation Age of Onset     Breast Cancer Mother 56     Cancer Mother         skin     Cancer Father         skin     Cancer Maternal Grandfather         liver     Diabetes Maternal Grandfather      Heart Disease Maternal Grandfather         questionable MI     Cancer Maternal Grandmother         stomach     C.A.D. Paternal Grandfather 70        MI     Hypertension No family hx of      Cerebrovascular Disease No family hx of        Objective  Ht 1.588 m (5' 2.5\")   Wt 70.3 kg (155 lb)   BMI 27.90 kg/m        General: healthy, alert and in no distress      HEENT: no scleral icterus    Resp: normal respiratory effort without conversational dyspnea     Psych: normal mood and affect      Left hip exam    ROM:       Reports limited terminal flexion and IR>ER     Strength:        No reported strength issues    Provider Notes:  This pain came on after a mild increase in mileage recently, but has been running in this range for 3 yrs.  No known injury.  No radiating pain.  Pain localized to groin area and somewhat in lateral hip.  No prior injuries to the hip that she can recall.    Radiology  No imaging available to review    Assessment:  1. Hip pain, right        Plan:  Discussed the assessment with the patient.  Follow up: prn based on " clinical progress  Pain does sound like it localizes to her hip joint, but came on abruptly  Lower suspicion for stress fracture  Possible somatic dysfunction, OA flare, cannot r/o labral tear, could be hip flexor tendinitis as well  Start PT for now, can confer with PT after working with her  If improving we can continue this, if there is a plateau, worsening pain, or concern from therapist advise office visit and imaging  Use of short 1-2 wk course of NSAIDs reviewed, dosing and precautions reviewed if utilized  Low impact activity and core building strategies reviewed  We discussed modified progressive pain-free activity as tolerated  Home handouts provided and supportive care reviewed  All questions were answered today  Contact us with additional questions or concerns  Signs and sx of concern reviewed    Thanks very much for sending this nice lady to us, I will keep you updated with her progress      Roly Burr DO, CAQ  Primary Care Sports Medicine  [facility]           Disclaimer: This note consists of symbols derived from keyboarding, dictation and/or voice recognition software. As a result, there may be errors in the script that have gone undetected. Please consider this when interpreting information found in this chart.  Video-Visit Details    Type of service:  Video Visit    Video Start Time: 4:43pm  Video End Time: 5:04 pm    Originating Location (pt. Location): Home    Distant Location (provider location):  [facility]     Platform used for Video Visit: [employer]

## 2020-07-02 ENCOUNTER — THERAPY VISIT (OUTPATIENT)
Dept: PHYSICAL THERAPY | Facility: CLINIC | Age: 50
End: 2020-07-02
Attending: FAMILY MEDICINE
Payer: COMMERCIAL

## 2020-07-02 DIAGNOSIS — M25.551 HIP PAIN, RIGHT: ICD-10-CM

## 2020-07-02 PROCEDURE — 97161 PT EVAL LOW COMPLEX 20 MIN: CPT | Mod: GP | Performed by: PHYSICAL THERAPIST

## 2020-07-02 PROCEDURE — 97110 THERAPEUTIC EXERCISES: CPT | Mod: GP | Performed by: PHYSICAL THERAPIST

## 2020-07-02 NOTE — PROGRESS NOTES
"[facility] Initial Evaluation  Subjective:  The history is provided by the patient. No  was used.   Patient Health History  Danielle Thrasher being seen for right hip pain.     Problem began: 6/17/2020 (date of MD referral).   Problem occurred: noticed gradual onset of symptoms in early April after her health club closed and she started running outside.  She recalls some LBP around this same time. Noticed that is was painful to carry a laundry basket up the stairs.  Patient was previously running 25-30 miles /per week, but then stopped running altogether about 4 weeks ago     General health as reported by patient is good.  Pertinent medical history includes: asthma and depression.   Red flags:  None as reported by patient.  Medical allergies: none.   Surgeries: ganglion cyst left wrist, tonsillectomy, adenoidectomy.    Current medications:  Anti-depressants.    Current occupation is [employer]: works mostly from home.   Primary job tasks include:  Computer work and prolonged sitting.                  Therapist Generated HPI Evaluation         Type of problem:  Right hip.    This is a new condition.  Condition occurred with:  Running.    Patient reports pain:  Groin and other (proximal HS, central LB).    Pain radiates to:  No radiation.     Associated symptoms:  Loss of strength (\"It feels like there's no energy in my leg\"). Symptoms are exacerbated by running, other and walking (carrying thing up stairs or uphill in yard; bending to do gardening )  and relieved by rest.                              Objective:  System         Lumbar/SI Evaluation  ROM:    AROM Lumbar:   Flexion:          Mild loss  Ext:                    Mod loss (central LBP)    Side Bend:        Left:     Right:   Rotation:           Left:     Right:   Side Glide:        Left:  WNL    Right:  WNL                                                              Hip Evaluation  HIP AROM:    Flexion: " Left:   Right:  WNL      Abduction: Left:    Right:  WNL      Internal Rotation: Left:   Right: WNL  External Rotation: Left:   Right: WNL          Hip Special Testing:      Left hip negative for the following special tests:  Nova (patient asked to perform herself supine)               Repeated Movement Testing of Lumbar Spine:  REIL x 10  (During: decr LBP      After:  better  Mechanical effect:  incr ROM)    General     ROS    Assessment/Plan:    Patient is a 50 year old female with right hip complaints.  Given virtual environment, difficult to fully assess right hip today but unable to reproduce symptoms with exam.  I suspect, however, that patient may have a lumbar component to her pain.  Will continue to assess, based on response to exercises.  Patient has the following significant findings with corresponding treatment plan.                Diagnosis 1:  Right Hip Pain   Pain -  self management, education, directional preference exercise and home program  Decreased ROM/flexibility - therapeutic exercise  Decreased strength - therapeutic exercise  Decreased function - home program    Cumulative Therapy Evaluation is: Low complexity.    Previous and current functional limitations:  (See Goal Flow Sheet for this information)    Short term and Long term goals: (See Goal Flow Sheet for this information)     Communication ability:  Patient appears to be able to clearly communicate and understand verbal and written communication and follow directions correctly.  Treatment Explanation - The following has been discussed with the patient:    RX ordered/plan of care  Anticipated outcomes  Possible risks and side effects  This patient would benefit from PT intervention to resume normal activities.   Rehab potential is good.    Frequency:  1 X week, once daily  Duration:  for 6 weeks  Discharge Plan:  Achieve all LTG.  Independent in home treatment program.  Reach maximal therapeutic benefit.    Please refer to the daily  flowsheet for treatment today, total treatment time and time spent performing 1:1 timed codes.

## 2020-07-13 ENCOUNTER — THERAPY VISIT (OUTPATIENT)
Dept: PHYSICAL THERAPY | Facility: CLINIC | Age: 50
End: 2020-07-13
Payer: COMMERCIAL

## 2020-07-13 DIAGNOSIS — M25.551 HIP PAIN, RIGHT: ICD-10-CM

## 2020-07-13 PROCEDURE — 97530 THERAPEUTIC ACTIVITIES: CPT | Mod: GP | Performed by: PHYSICAL THERAPIST

## 2020-07-20 ENCOUNTER — TELEPHONE (OUTPATIENT)
Dept: FAMILY MEDICINE | Facility: CLINIC | Age: 50
End: 2020-07-20

## 2020-07-20 DIAGNOSIS — F41.1 GAD (GENERALIZED ANXIETY DISORDER): ICD-10-CM

## 2020-07-20 RX ORDER — CITALOPRAM HYDROBROMIDE 20 MG/1
20 TABLET ORAL DAILY
Qty: 90 TABLET | Refills: 3 | Status: SHIPPED | OUTPATIENT
Start: 2020-07-20 | End: 2020-07-22

## 2020-07-20 NOTE — TELEPHONE ENCOUNTER
Reason for call:  Medication   If this is a refill request, has the caller requested the refill from the pharmacy already? Yes  Will the patient be using a [facility]? No  Name of the pharmacy and phone number for the current request: [facility] phone [phone]     Name of the medication requested: Citalopram    Other request: Patient is requesting refill, states she is out of medication and that pharmacy sent request last Wednesday.    Phone number to reach patient:  Cell number on file:    Telephone Information:   Mobile [phone]       Best Time:  any    Can we leave a detailed message on this number?  YES    Travel screening: Not Applicable

## 2020-07-20 NOTE — LETTER
July 21, 2020          Danielle Thrasher,  [address] [facility] [address]          Dear Danielle Thrasher      Your provider has sent a  tadeo refill of citalopram (CELEXA) 20 MG tablet that was sent on 6/8/2020 for a 1 year supply. Please call your pharmacy for refills. You are due for an appointment for further refills. We are currently only able to see select in person visits. We ask that you schedule a telephone visit, video visit or evisit (through [employer]) with your provider.  Please contact the clinic to schedule an appointment for further refills.     Sincerely,         Your [facility] Care Team/

## 2020-07-21 DIAGNOSIS — F41.1 GAD (GENERALIZED ANXIETY DISORDER): ICD-10-CM

## 2020-07-21 NOTE — TELEPHONE ENCOUNTER
Reason for Call:  Other prescription    Detailed comments: Patient calling, states she would like to know where the RX was sent to. She would [facility] to [facility] Please call back to clarify.    Phone Number Patient can be reached at: Cell number on file:    Telephone Information:   Mobile [phone]       Best Time: any    Can we leave a detailed message on this number? YES    Call taken on 7/21/2020 at 6:21 PM by Willi Bolanos

## 2020-07-21 NOTE — TELEPHONE ENCOUNTER
Reason for call:  Medication   If this is a refill request, has the caller requested the refill from the pharmacy already? Yes  Will the patient be using a [facility]? No  Name of the pharmacy and phone number for the current request[facility]     Name of the medication requested: citalopram (CELEXA) 20 MG tablet    Other request:  Patient is switching to mail order pharmacy and is requesting that someone calls them to discuss a prescription renewal [phone]  Mail order pharmacy said they tried to fax over the request but wasn't able to so they need to speak to someone directly.       Phone number to reach patient:  Home number on file [phone] (home)    Best Time:  any    Can we leave a detailed message on this number?  YES    Travel screening: Negative

## 2020-07-22 RX ORDER — CITALOPRAM HYDROBROMIDE 20 MG/1
20 TABLET ORAL DAILY
Qty: 90 TABLET | Refills: 3 | Status: SHIPPED | OUTPATIENT
Start: 2020-07-22 | End: 2021-06-06

## 2020-07-22 NOTE — TELEPHONE ENCOUNTER
Patient requested prescription be sent to [facility], prescription was sent to [facility]. Called patient and apologized for the delay.  Patient verbalized understanding, no further questions or concerns.    Estella Tamayo RN

## 2020-07-22 NOTE — TELEPHONE ENCOUNTER
Prescription resent to correct pharmacy, was sent x2 to [facility] after [facility].  apologized to patient for the inconvenience.   Estella Tamayo RN

## 2020-07-27 ENCOUNTER — THERAPY VISIT (OUTPATIENT)
Dept: PHYSICAL THERAPY | Facility: CLINIC | Age: 50
End: 2020-07-27
Payer: COMMERCIAL

## 2020-07-27 DIAGNOSIS — M25.551 HIP PAIN, RIGHT: ICD-10-CM

## 2020-07-27 PROCEDURE — 97110 THERAPEUTIC EXERCISES: CPT | Mod: 95 | Performed by: PHYSICAL THERAPIST

## 2020-08-10 ENCOUNTER — THERAPY VISIT (OUTPATIENT)
Dept: PHYSICAL THERAPY | Facility: CLINIC | Age: 50
End: 2020-08-10
Payer: COMMERCIAL

## 2020-08-10 DIAGNOSIS — M25.551 HIP PAIN, RIGHT: ICD-10-CM

## 2020-08-10 PROCEDURE — 97110 THERAPEUTIC EXERCISES: CPT | Mod: 95 | Performed by: PHYSICAL THERAPIST

## 2020-08-10 NOTE — PROGRESS NOTES
Subjective:  HPI  Physical Exam                    Objective:  System    Physical Exam    General     ROS    Assessment/Plan:    DISCHARGE REPORT    Progress reporting period is from 7/02/2020 to 8/10/2020.       SUBJECTIVE  Patient reports significant improvement in her right hip pain since the start of therapy.  Symptoms have essentially resolved with lumbar extension exercises.  She has returned to running for 30 minutes, doing yardwork for up to an hour, and carrying things up the stairs without any problem.         Current pain level is 0/10  .     Changes in function:  Yes, as noted above  Adverse reaction to treatment or activity: None    OBJECTIVE  Lumbar AROM: patient describes full and painfree lumbar ROM.  Denies any LE weakness.       ASSESSMENT/PLAN  STG/LTGs have been met or progress has been made towards goals:  Yes (See Goal flow sheet completed today.)  Assessment of Progress: The patient has met all of her long term goals.  Self Management Plans:  Patient  has been instructed in self management of symptoms.  PT intervention is no longer required to meet STG/LTG.    Recommendations:  This patient is ready to be discharged from therapy and continue their home treatment program. Encouraged patient to call if she has any questions or concerns.      Please refer to the daily flowsheet for treatment today, total treatment time and time spent performing 1:1 timed codes.

## 2020-11-14 NOTE — MR AVS SNAPSHOT
"              After Visit Summary   5/3/2018    Danielle Thrasher    MRN: [id number]           Patient Information     Date Of Birth          1970        Visit Information        Provider Department      5/3/2018 1:45 PM Fani Jacobsen MD [facility]        Today's Diagnoses     IUD check up    -  1       Follow-ups after your visit        Who to contact     If you have questions or need follow up information about today's clinic visit or your schedule please contact [facility] directly at [phone].  Normal or non-critical lab and imaging results will be communicated to you by Heckylhart, letter or phone within 4 business days after the clinic has received the results. If you do not hear from us within 7 days, please contact the clinic through [employer] or phone. If you have a critical or abnormal lab result, we will notify you by phone as soon as possible.  Submit refill requests through Contacts+ or call your pharmacy and they will forward the refill request to us. Please allow 3 business days for your refill to be completed.          Additional Information About Your Visit        MyChart Information     Contacts+ gives you secure access to your electronic health record. If you see a primary care provider, you can also send messages to your care team and make appointments. If you have questions, please call your primary care clinic.  If you do not have a primary care provider, please call [phone] and they will assist you.        Care EveryWhere ID     This is your Care EveryWhere ID. This could be used by other organizations to access your [facility] medical records  [id number]        Your Vitals Were     Pulse Temperature Height Pulse Oximetry BMI (Body Mass Index)       63 98.5  F (36.9  C) (Oral) 5' 2.25\" (1.581 m) 98% 27.54 kg/m2        Blood Pressure from Last 3 Encounters:   05/03/18 119/81   02/14/18 120/74   02/06/18 132/72    Weight from Last 3 " Encounters:   05/03/18 151 lb 12.8 oz (68.9 kg)   02/14/18 152 lb 9.6 oz (69.2 kg)   02/06/18 151 lb (68.5 kg)              Today, you had the following     No orders found for display         Today's Medication Changes          These changes are accurate as of 5/3/18 11:59 PM.  If you have any questions, ask your nurse or doctor.               Stop taking these medicines if you haven't already. Please contact your care team if you have questions.     levonorgestrel-ethinyl estradiol per tablet   Commonly known as:  ENPRESSE   Stopped by:  Fani Jacobsen MD                    Primary Care Provider Office Phone # Fax #    Mehnaz Cabrera [phone] [address] [facility] [address]        Equal Access to Services     [facility]: Hadii julia burgess hadasho Sosuzanne, waaxda luqadaha, qaybta kaalmada erika, leon valentin . So [facility] [phone].    ATENCIÓN: Si habla español, tiene a geronimo disposición servicios gratuitos de asistencia lingüística. [facility] [phone].    We comply with applicable federal civil rights laws and [place] laws. We do not discriminate on the basis of race, color, national origin, age, disability, sex, sexual orientation, or gender identity.            Thank you!     Thank you for choosing [facility]  for your care. Our goal is always to provide you with excellent care. Hearing back from our patients is one way we can continue to improve our services. Please take a few minutes to complete the written survey that you may receive in the mail after your visit with us. Thank you!             Your Updated Medication List - Protect others around you: Learn how to safely use, store and throw away your medicines at www.disposemymeds.org.          This list is accurate as of 5/3/18 11:59 PM.  Always use your most recent med list.                   Brand Name Dispense Instructions for use Diagnosis    acetaminophen  500 MG tablet    TYLENOL    100 tablet    Take 1-2 tablets by mouth every 6 hours as needed for pain and fever.    Routine general medical examination at a health care facility       albuterol 108 (90 Base) MCG/ACT Inhaler    PROAIR HFA/PROVENTIL HFA/VENTOLIN HFA    1 Inhaler    Inhale 2 puffs into the lungs every 4 hours as needed for shortness of breath / dyspnea or wheezing    Intermittent asthma, uncomplicated       calcium carbonate 500 tablet    OS-JACKSON 500 mg [place]. Ca     Take 500 mg by mouth daily        cetirizine 10 MG tablet    ZYRTEC    90 tablet    Take 1 tablet by mouth daily.    Allergic rhinitis       citalopram 20 MG tablet    celeXA    90 tablet    Take 1 tablet (20 mg) by mouth daily    BRIDGET (generalized anxiety disorder)       levonorgestrel 20 MCG/24HR IUD    MIRENA    1 each    1 each (20 mcg) by Intrauterine route once for 1 dose    Encounter for IUD insertion       Multi-vitamin Tabs tablet   Generic drug:  multivitamin, therapeutic with minerals     90 tablet    Take 1 tablet by mouth daily.    Routine general medical examination at a health care facility       OMEGA-3 FISH OIL PO      Take by mouth daily        UNABLE TO FIND      MEDICATION NAME: Tumeric        VITAMIN C PO      Take 500 mg by mouth           none

## 2021-03-20 ENCOUNTER — HEALTH MAINTENANCE LETTER (OUTPATIENT)
Age: 51
End: 2021-03-20

## 2021-03-22 ENCOUNTER — IMMUNIZATION (OUTPATIENT)
Dept: NURSING | Facility: CLINIC | Age: 51
End: 2021-03-22
Payer: COMMERCIAL

## 2021-03-22 PROCEDURE — 91300 PR COVID VAC PFIZER DIL RECON 30 MCG/0.3 ML IM: CPT

## 2021-03-22 PROCEDURE — 0001A PR COVID VAC PFIZER DIL RECON 30 MCG/0.3 ML IM: CPT

## 2021-04-12 ENCOUNTER — IMMUNIZATION (OUTPATIENT)
Dept: NURSING | Facility: CLINIC | Age: 51
End: 2021-04-12
Attending: FAMILY MEDICINE
Payer: COMMERCIAL

## 2021-04-12 ENCOUNTER — VIRTUAL VISIT (OUTPATIENT)
Dept: FAMILY MEDICINE | Facility: CLINIC | Age: 51
End: 2021-04-12
Payer: COMMERCIAL

## 2021-04-12 DIAGNOSIS — M22.2X9 PATELLOFEMORAL STRESS SYNDROME, UNSPECIFIED LATERALITY: ICD-10-CM

## 2021-04-12 DIAGNOSIS — Z12.11 SPECIAL SCREENING FOR MALIGNANT NEOPLASMS, COLON: ICD-10-CM

## 2021-04-12 DIAGNOSIS — M25.561 CHRONIC PAIN OF RIGHT KNEE: Primary | ICD-10-CM

## 2021-04-12 DIAGNOSIS — G89.29 CHRONIC PAIN OF RIGHT KNEE: Primary | ICD-10-CM

## 2021-04-12 PROCEDURE — 99213 OFFICE O/P EST LOW 20 MIN: CPT | Mod: TEL | Performed by: FAMILY MEDICINE

## 2021-04-12 PROCEDURE — 0002A PR COVID VAC PFIZER DIL RECON 30 MCG/0.3 ML IM: CPT

## 2021-04-12 PROCEDURE — 91300 PR COVID VAC PFIZER DIL RECON 30 MCG/0.3 ML IM: CPT

## 2021-04-12 NOTE — PROGRESS NOTES
"Danielle is a 50 year old who is being evaluated via a billable telephone visit.      What phone number would you like to be contacted at? [phone]  How would you like to obtain your AVS? Isaelhart    Assessment & Plan     Chronic pain of right knee  Patellofemoral stress syndrome, unspecified laterality  -consider XR at an in person appointment   -offered Sports Medicine referral   - [facility]; Future    Special screening for malignant neoplasms, colon  - TIANA([employer])             BMI:   Estimated body mass index is 27.9 kg/m  as calculated from the following:    Height as of 6/17/20: 1.588 m (5' 2.5\").    Weight as of 6/17/20: 70.3 kg (155 lb).   Weight management plan: Patient was referred to their PCP to discuss a diet and exercise plan.    See Patient Instructions    Return in about 1 month (around 5/12/2021) for physical, mammogram.    Mehnaz Cabrera MD  [facility]    Subjective   Danielle is a 50 year old who presents for the following health issues     HPI     Musculoskeletal problem/pain  Onset/Duration: x6 weeks  Description  Location: knee - right  Joint Swelling: no  Redness: no  Pain: YES  Warmth: YES- slightly comes and goes  Intensity:  mild, moderate  Progression of Symptoms:  worsening  Accompanying signs and symptoms:   Fevers: no  Numbness/tingling/weakness: no  History  Trauma to the area: no  Recent illness:  no  Previous similar problem: YES  Previous evaluation:  YES- 10 years ago  Precipitating or alleviating factors:  Aggravating factors include: exercise, going downstairs, and sitting cross legged   Therapies tried and outcome: rest/inactivity, ice, massage, stretching and acetaminophen not helping        Review of Systems   CONSTITUTIONAL: NEGATIVE for fever, chills, change in weight  RESP:Hx asthma  MUSCULOSKELETAL: see HPI   PSYCHIATRIC: NEGATIVE for changes in mood or affect      Objective           Vitals:  No vitals were obtained today " due to virtual visit.    Physical Exam   healthy, alert and no distress  PSYCH: Alert and oriented times 3; coherent speech, normal   rate and volume, able to articulate logical thoughts, able   to abstract reason, no tangential thoughts, no hallucinations   or delusions  Her affect is normal  RESP: No cough, no audible wheezing, able to talk in full sentences  Remainder of exam unable to be completed due to telephone visits    Office Visit on 05/01/2019   Component Date Value Ref Range Status     HIV Antigen Antibody Combo 05/01/2019 Nonreactive  NR^Nonreactive     Final    HIV-1 p24 Ag & HIV-1/HIV-2 Ab Not Detected     Cholesterol 05/01/2019 120  <200 mg/dL Final     Triglycerides 05/01/2019 48  <150 mg/dL Final    Fasting specimen     HDL Cholesterol 05/01/2019 64  >49 mg/dL Final     LDL Cholesterol Calculated 05/01/2019 46  <100 mg/dL Final    Desirable:       <100 mg/dl     Non HDL Cholesterol 05/01/2019 56  <130 mg/dL Final     PAP 05/01/2019 NIL   Final     Copath Report 05/01/2019    Final                    Value:  Patient Name: SANDRA SAMS  MR#: [id number]  Specimen #: [id number]  Collected: 5/1/2019  Received: 5/2/2019  Reported: 5/3/2019 15:02  Ordering Phy(s): NITA REEDER    For improved result formatting, select 'View Enhanced Report Format' under   Linked Documents section.    SPECIMEN/STAIN PROCESS:  Pap imaged thin layer prep screening (Surepath, FocalPoint with guided   screening)       Pap-Cyto x 1, HPV ordered x 1    SOURCE: Cervical, endocervical  ----------------------------------------------------------------   Pap imaged thin layer prep screening (Surepath, FocalPoint with guided   screening)  SPECIMEN ADEQUACY:  Satisfactory for evaluation.  -Transformation zone component present.    CYTOLOGIC INTERPRETATION:    Negative for intraepithelial lesion or malignancy    Electronically signed out by:  JANETH Schulz (ASCP)    CLINICAL HISTORY:    Intra-Uterine Device, A previous  normal pap  Date of Last Pap: 9/24/2014,    Papanicolaou Test Limitations:  Cervical cytology                           is a screening test with   limited sensitivity; regular  screening is critical for cancer prevention; Pap tests are primarily   effective for the diagnosis/prevention of  squamous cell carcinoma, not adenocarcinomas or other cancers.    COLLECTION SITE:  Client:  [facility]  Location: [facility] ([facility])    The technical component of this testing was completed at the [facility], with the professional component performed   at the [facility], [address] [place] [phone] ([phone])         HPV Source 05/01/2019 SurePath   Final     HPV 16 DNA 05/01/2019 Negative  NEG^Negative Final     HPV 18 DNA 05/01/2019 Negative  NEG^Negative Final     Other HR HPV 05/01/2019 Negative  NEG^Negative Final     Final Diagnosis 05/01/2019 This patient's sample is negative for HPV DNA.   Final    Comment: This test was developed and its performance characteristics determined by the   [facility], [facility]. It   has not been cleared or approved by the FDA. The laboratory is regulated under   CLIA as qualified to perform high-complexity testing. This test is used for   clinical purposes. It should not be regarded as investigational or for   research.  (Note)  METHODOLOGY:  The Roche neida 4800 system uses automated extraction,   simultaneous amplification of HPV (L1 region) and beta-globin,    followed by  real time detection of fluorescent labeled HPV and beta   globin using specific oligonucleotide probes . The test specifically   identifies types HPV 16 DNA and HPV 18 DNA while concurrently   detecting the rest of the high risk types (31, 33, 35, 39, 45, 51,   52, 56, 58, 59, 66 or 68).  COMMENTS:  This  test is not intended for use as a screening device   for women under age 30 with normal cervical cytology.  Results should   be correl                           ated with cytologic and histologic findings. Close clinical   followup is recommended.       Specimen Description 05/01/2019 Cervical Cells   Final    [id number]      Phone call duration: 9 minutes

## 2021-04-13 ASSESSMENT — ASTHMA QUESTIONNAIRES: ACT_TOTALSCORE: 25

## 2021-05-03 ENCOUNTER — THERAPY VISIT (OUTPATIENT)
Dept: PHYSICAL THERAPY | Facility: CLINIC | Age: 51
End: 2021-05-03
Payer: COMMERCIAL

## 2021-05-03 DIAGNOSIS — G89.29 CHRONIC PAIN OF RIGHT KNEE: ICD-10-CM

## 2021-05-03 DIAGNOSIS — M25.561 CHRONIC PAIN OF RIGHT KNEE: ICD-10-CM

## 2021-05-03 DIAGNOSIS — M25.561 RIGHT KNEE PAIN: ICD-10-CM

## 2021-05-03 DIAGNOSIS — M22.2X9 PATELLOFEMORAL STRESS SYNDROME, UNSPECIFIED LATERALITY: ICD-10-CM

## 2021-05-03 PROCEDURE — 97110 THERAPEUTIC EXERCISES: CPT | Mod: GP | Performed by: PHYSICAL THERAPIST

## 2021-05-03 PROCEDURE — 97161 PT EVAL LOW COMPLEX 20 MIN: CPT | Mod: GP | Performed by: PHYSICAL THERAPIST

## 2021-05-03 ASSESSMENT — ACTIVITIES OF DAILY LIVING (ADL)
WALK: ACTIVITY IS MINIMALLY DIFFICULT
GIVING WAY, BUCKLING OR SHIFTING OF KNEE: THE SYMPTOM AFFECTS MY ACTIVITY SLIGHTLY
HOW_WOULD_YOU_RATE_THE_OVERALL_FUNCTION_OF_YOUR_KNEE_DURING_YOUR_USUAL_DAILY_ACTIVITIES?: ABNORMAL
KNEEL ON THE FRONT OF YOUR KNEE: ACTIVITY IS VERY DIFFICULT
LIMPING: THE SYMPTOM AFFECTS MY ACTIVITY MODERATELY
RISE FROM A CHAIR: ACTIVITY IS MINIMALLY DIFFICULT
WEAKNESS: THE SYMPTOM AFFECTS MY ACTIVITY MODERATELY
AS_A_RESULT_OF_YOUR_KNEE_INJURY,_HOW_WOULD_YOU_RATE_YOUR_CURRENT_LEVEL_OF_DAILY_ACTIVITY?: ABNORMAL
SIT WITH YOUR KNEE BENT: ACTIVITY IS VERY DIFFICULT
STIFFNESS: THE SYMPTOM AFFECTS MY ACTIVITY MODERATELY
STAND: ACTIVITY IS MINIMALLY DIFFICULT
SWELLING: I DO NOT HAVE THE SYMPTOM
GO DOWN STAIRS: ACTIVITY IS VERY DIFFICULT
GO UP STAIRS: ACTIVITY IS SOMEWHAT DIFFICULT
SQUAT: ACTIVITY IS NOT DIFFICULT
KNEE_ACTIVITY_OF_DAILY_LIVING_SUM: 38
PAIN: THE SYMPTOM AFFECTS MY ACTIVITY SEVERELY
KNEE_ACTIVITY_OF_DAILY_LIVING_SCORE: 54.29
RAW_SCORE: 38
HOW_WOULD_YOU_RATE_THE_CURRENT_FUNCTION_OF_YOUR_KNEE_DURING_YOUR_USUAL_DAILY_ACTIVITIES_ON_A_SCALE_FROM_0_TO_100_WITH_100_BEING_YOUR_LEVEL_OF_KNEE_FUNCTION_PRIOR_TO_YOUR_INJURY_AND_0_BEING_THE_INABILITY_TO_PERFORM_ANY_OF_YOUR_USUAL_DAILY_ACTIVITIES?: 50

## 2021-05-03 NOTE — PROGRESS NOTES
Physical Therapy Initial Evaluation  Subjective:  The history is provided by the patient. No  was used.   Patient Health History  Danielle Thrasher being seen for Chronic Pain of R knee.     Problem began: 4/12/2021.   Problem occurred: recently started running in February.   Pain is reported as 6/10 on pain scale.  General health as reported by patient is good.  Health conditions: overweight, depression, asthma, pain atn ight/rest.   Red flags:  None as reported by patient.  Medical allergies: none.   Surgeries: carpal tunnel.    Current medications: anti depressants.    Current occupation is [employer].   Primary job tasks include:  Computer work and prolonged sitting.                  Therapist Generated HPI Evaluation         Type of problem:  Right knee.    This is a chronic condition.  Condition occurred with:  Running.  Where condition occurred: during recreation/sport.  Patient reports pain:  Anterior and in the joint.  Pain is described as aching and sharp and is intermittent.  Radiates to: none.   Since onset symptoms are unchanged.  Symptoms are exacerbated by running (twisting (raking), lunges)  and relieved by rest.      Restrictions due to condition include:  Working in normal job without restrictions.  Barriers include:  None as reported by patient.    Exercise; pt does gardening, running, raking, virtual sessions with a  2x per week. Pt reports focusing more on upper body lately due to the Right knee pain. Pt does wall sits and squats are not painful, lunges are painful                    Objective:  System                                           Hip Evaluation    Hip Strength:        Abduction:  Left: 4+/5      Pain:strong/pain freeRight: 3+/5     Pain:weak/pain free  Adduction:  Left: /5   Pain:strong/pain free  Internal Rotation:  Left: 4+/5     Pain:strong/pain freeRight: 3+/5    Pain:weak/pain free  External Rotation:  Left: 4/5    Pain:  strong/pain free  Right: 3/5    Pain: weak/pain free                     Knee Evaluation:  ROM:    AROM    Hyperextension: Left:  5*    Right:  Extension:  Left: 0    Right:  5* flexed  Flexion: Left: 140    Right: 80  PROM        Flexion: Left:   Right:  120  Pain: pain with knee extension and flexion end range.    Strength:     Extension:  Left: 5/5   Strong/pain free  Pain:      Right: 4+/5   Strong/painful  Pain:  Flexion:  Left: 5/5   Strong/pain free  Pain:      Right: 5/5   Strong/pain free  Pain:    Quad Set Left:  Good and WNL    Pain: -   Quad Set Right:  Good and WNL    Pain: +/-            Functional Testing:        Core Strength:    Single Leg Bridge: Left:  20/20 reps    Right: 20/20 reps    % of Uninvolved:   Quad:    Single Leg Squat:  Left:         20  Mild loss of control and femoral IR  Right:      10  Significant loss of control, femoral IR, excessive anterior knee excursion and decreased hip/trunk flexion  Bilateral Leg Squat:  30  Normal control              General     ROS    Assessment/Plan:    Patient is a 50 year old female with right side knee complaints.    Patient has the following significant findings with corresponding treatment plan.                Diagnosis 1:  Right Knee Pain  Pain -  manual therapy, self management, education, directional preference exercise and home program  Decreased ROM/flexibility - manual therapy and therapeutic exercise  Decreased strength - therapeutic exercise and therapeutic activities  Impaired posture - neuro re-education    Therapy Evaluation Codes:   1) History comprised of:   Personal factors that impact the plan of care:      None.    Comorbidity factors that impact the plan of care are:      None.     Medications impacting care: None.  2) Examination of Body Systems comprised of:   Body structures and functions that impact the plan of care:      Right Knee.   Activity limitations that impact the plan of care are:      lunging, running, lifting,  housework.  3) Clinical presentation characteristics are:   Stable/Uncomplicated.  4) Decision-Making    Low complexity using standardized patient assessment instrument and/or measureable assessment of functional outcome.  Cumulative Therapy Evaluation is: Low complexity.    Previous and current functional limitations:  (See Goal Flow Sheet for this information)    Short term and Long term goals: (See Goal Flow Sheet for this information)     Communication ability:  Patient appears to be able to clearly communicate and understand verbal and written communication and follow directions correctly.  Treatment Explanation - The following has been discussed with the patient:   RX ordered/plan of care  Anticipated outcomes  Possible risks and side effects  This patient would benefit from PT intervention to resume normal activities.   Rehab potential is excellent.    Frequency:  1 X week, once daily  Duration:  for 4 weeks  Discharge Plan:  Achieve all LTG.  Independent in home treatment program.  Reach maximal therapeutic benefit.    Please refer to the daily flowsheet for treatment today, total treatment time and time spent performing 1:1 timed codes.

## 2021-05-03 NOTE — LETTER
[facility]  [address]  [address]  [place] [address]  [phone]    May 4, 2021    Re: Danielle Thrasher   :   1970  MRN:  [id number]   REFERRING PHYSICIAN:   MD [facility]  Date of Initial Evaluation:  2021  Visits:  Rxs Used: 1  Reason for Referral:     Chronic pain of right knee  Patellofemoral stress syndrome, unspecified laterality  Right knee pain    EVALUATION SUMMARY    Physical Therapy Initial Evaluation  Subjective:  The history is provided by the patient. No  was used.   Patient Health History  Danielle Thrasher being seen for Chronic Pain of R knee.   Problem began: 2021.   Problem occurred: recently started running in February.   Pain is reported as 6/10 on pain scale.  General health as reported by patient is good.  Health conditions: overweight, depression, asthma, pain atn ight/rest.   Red flags:  None as reported by patient.  Medical allergies: none.   Surgeries: carpal tunnel.    Current medications: anti depressants.    Current occupation is [employer].   Primary job tasks include:  Computer work and prolonged sitting.                Therapist Generated HPI Evaluation    Type of problem:  Right knee.  This is a chronic condition.  Condition occurred with:  Running.  Where condition occurred: during recreation/sport.  Patient reports pain:  Anterior and in the joint.  Pain is described as aching and sharp and is intermittent.  Radiates to: none.   Since onset symptoms are unchanged.  Symptoms are exacerbated by running (twisting (raking), lunges)  and relieved by rest.    Restrictions due to condition include:  Working in normal job without restrictions.  Barriers include:  None as reported by patient.      Re: Danielle Thrasher   :   1970    Exercise; pt does gardening, running, raking, virtual sessions with  a  2x per week. Pt reports focusing more on upper body lately due to the Right knee pain. Pt does wall sits and squats are not painful, lunges are painful                  Objective:  Hip Evaluation  Hip Strength:    Abduction:  Left: 4+/5      Pain:strong/pain freeRight: 3+/5     Pain:weak/pain free  Adduction:  Left: /5   Pain:strong/pain free  Internal Rotation:  Left: 4+/5     Pain:strong/pain freeRight: 3+/5    Pain:weak/pain free  External Rotation:  Left: 4/5    Pain: strong/pain free  Right: 3/5    Pain: weak/pain free       Knee Evaluation:  ROM:    AROM  Hyperextension: Left:  5*    Right:  Extension:  Left: 0    Right:  5* flexed  Flexion: Left: 140    Right: 80  PROM  Flexion: Left:   Right:  120  Pain: pain with knee extension and flexion end range.    Strength:   Extension:  Left: 5/5   Strong/pain free  Pain:      Right: 4+/5   Strong/painful  Pain:  Flexion:  Left: 5/5   Strong/pain free  Pain:      Right: 5/5   Strong/pain free  Pain:    Quad Set Left:  Good and WNL    Pain: -   Quad Set Right:  Good and WNL    Pain: +/-    Functional Testing:    Core Strength:    Single Leg Bridge: Left:  20/20 reps    Right: 20/20 reps    % of Uninvolved:     Quad:    Single Leg Squat:  Left:         20  Mild loss of control and femoral IR  Right:      10  Significant loss of control, femoral IR, excessive anterior knee excursion and decreased hip/trunk flexion  Bilateral Leg Squat:  30  Normal control      Assessment/Plan:    Patient is a 50 year old female with right side knee complaints.    Patient has the following significant findings with corresponding treatment plan.                Diagnosis 1:  Right Knee Pain  Pain -  manual therapy, self management, education, directional preference exercise and home program  Decreased ROM/flexibility - manual therapy and therapeutic exercise  Decreased strength - therapeutic exercise and therapeutic activities  Impaired posture - neuro  re-education        Re: Danielle Thrasher   :   1970    Therapy Evaluation Codes:   1) History comprised of:   Personal factors that impact the plan of care:      None.    Comorbidity factors that impact the plan of care are:      None.     Medications impacting care: None.  2) Examination of Body Systems comprised of:   Body structures and functions that impact the plan of care:      Right Knee.   Activity limitations that impact the plan of care are:      lunging, running, lifting, housework.  3) Clinical presentation characteristics are:   Stable/Uncomplicated.  4) Decision-Making    Low complexity using standardized patient assessment instrument and/or measureable assessment of functional outcome.  Cumulative Therapy Evaluation is: Low complexity.    Previous and current functional limitations:  (See Goal Flow Sheet for this information)    Short term and Long term goals: (See Goal Flow Sheet for this information)     Communication ability:  Patient appears to be able to clearly communicate and understand verbal and written communication and follow directions correctly.  Treatment Explanation - The following has been discussed with the patient:   RX ordered/plan of care  Anticipated outcomes  Possible risks and side effects  This patient would benefit from PT intervention to resume normal activities.   Rehab potential is excellent.    Frequency:  1 X week, once daily  Duration:  for 4 weeks  Discharge Plan:  Achieve all LTG.  Independent in home treatment program.  Reach maximal therapeutic benefit.    Please refer to the daily flowsheet for treatment today, total treatment time and time spent performing 1:1 timed codes.       Thank you for your referral.    INQUIRIES  Therapist: Marge Patel, PT, DPT   [facility] [address] [address]  Phone: [phone]  Fax: [phone]

## 2021-05-10 LAB — COLOGUARD-ABSTRACT: NEGATIVE

## 2021-05-24 ENCOUNTER — THERAPY VISIT (OUTPATIENT)
Dept: PHYSICAL THERAPY | Facility: CLINIC | Age: 51
End: 2021-05-24
Payer: COMMERCIAL

## 2021-05-24 DIAGNOSIS — M25.561 RIGHT KNEE PAIN: ICD-10-CM

## 2021-05-24 PROCEDURE — 97530 THERAPEUTIC ACTIVITIES: CPT | Mod: GP | Performed by: PHYSICAL THERAPIST

## 2021-05-24 PROCEDURE — 97110 THERAPEUTIC EXERCISES: CPT | Mod: GP | Performed by: PHYSICAL THERAPIST

## 2021-06-02 DIAGNOSIS — F41.1 GAD (GENERALIZED ANXIETY DISORDER): ICD-10-CM

## 2021-06-06 RX ORDER — CITALOPRAM HYDROBROMIDE 20 MG/1
20 TABLET ORAL DAILY
Qty: 90 TABLET | Refills: 0 | Status: SHIPPED | OUTPATIENT
Start: 2021-06-06 | End: 2021-09-09

## 2021-06-06 NOTE — TELEPHONE ENCOUNTER
Medication is being filled for 1 time refill only due to:  Patient needs to be seen because needs annual exam.   Sent my chart to schedule appt.  Janeth Peng RN

## 2021-09-04 ENCOUNTER — HEALTH MAINTENANCE LETTER (OUTPATIENT)
Age: 51
End: 2021-09-04

## 2021-09-09 ENCOUNTER — MYC REFILL (OUTPATIENT)
Dept: FAMILY MEDICINE | Facility: CLINIC | Age: 51
End: 2021-09-09

## 2021-09-09 DIAGNOSIS — F41.1 GAD (GENERALIZED ANXIETY DISORDER): ICD-10-CM

## 2021-09-13 RX ORDER — CITALOPRAM HYDROBROMIDE 20 MG/1
20 TABLET ORAL DAILY
Qty: 90 TABLET | Refills: 0 | Status: SHIPPED | OUTPATIENT
Start: 2021-09-13 | End: 2021-09-22

## 2021-09-13 NOTE — TELEPHONE ENCOUNTER
Prescription approved per [facility] Refill Protocol.    Marge Horton RN  [facility]

## 2021-09-22 ENCOUNTER — MYC MEDICAL ADVICE (OUTPATIENT)
Dept: FAMILY MEDICINE | Facility: CLINIC | Age: 51
End: 2021-09-22

## 2021-09-22 ENCOUNTER — VIRTUAL VISIT (OUTPATIENT)
Dept: FAMILY MEDICINE | Facility: CLINIC | Age: 51
End: 2021-09-22
Payer: COMMERCIAL

## 2021-09-22 DIAGNOSIS — F41.1 GAD (GENERALIZED ANXIETY DISORDER): Primary | ICD-10-CM

## 2021-09-22 DIAGNOSIS — Z63.0 MARITAL DYSFUNCTION: ICD-10-CM

## 2021-09-22 DIAGNOSIS — Z12.31 VISIT FOR SCREENING MAMMOGRAM: ICD-10-CM

## 2021-09-22 DIAGNOSIS — J45.20 INTERMITTENT ASTHMA, UNCOMPLICATED: ICD-10-CM

## 2021-09-22 PROBLEM — M25.561 RIGHT KNEE PAIN: Status: RESOLVED | Noted: 2021-05-03 | Resolved: 2021-09-22

## 2021-09-22 PROCEDURE — 99214 OFFICE O/P EST MOD 30 MIN: CPT | Mod: GT | Performed by: FAMILY MEDICINE

## 2021-09-22 RX ORDER — ALBUTEROL SULFATE 90 UG/1
2 AEROSOL, METERED RESPIRATORY (INHALATION) EVERY 4 HOURS PRN
Qty: 18 G | Refills: 4 | Status: SHIPPED | OUTPATIENT
Start: 2021-09-22 | End: 2022-11-23

## 2021-09-22 RX ORDER — CITALOPRAM HYDROBROMIDE 20 MG/1
20 TABLET ORAL DAILY
Qty: 90 TABLET | Refills: 3 | Status: SHIPPED | OUTPATIENT
Start: 2021-09-22 | End: 2021-11-10 | Stop reason: DRUGHIGH

## 2021-09-22 SDOH — SOCIAL STABILITY - SOCIAL INSECURITY: PROBLEMS IN RELATIONSHIP WITH SPOUSE OR PARTNER: Z63.0

## 2021-09-22 ASSESSMENT — ANXIETY QUESTIONNAIRES
GAD7 TOTAL SCORE: 10
IF YOU CHECKED OFF ANY PROBLEMS ON THIS QUESTIONNAIRE, HOW DIFFICULT HAVE THESE PROBLEMS MADE IT FOR YOU TO DO YOUR WORK, TAKE CARE OF THINGS AT HOME, OR GET ALONG WITH OTHER PEOPLE: VERY DIFFICULT
2. NOT BEING ABLE TO STOP OR CONTROL WORRYING: MORE THAN HALF THE DAYS
1. FEELING NERVOUS, ANXIOUS, OR ON EDGE: NOT AT ALL
7. FEELING AFRAID AS IF SOMETHING AWFUL MIGHT HAPPEN: NEARLY EVERY DAY
5. BEING SO RESTLESS THAT IT IS HARD TO SIT STILL: NOT AT ALL
3. WORRYING TOO MUCH ABOUT DIFFERENT THINGS: MORE THAN HALF THE DAYS
6. BECOMING EASILY ANNOYED OR IRRITABLE: MORE THAN HALF THE DAYS

## 2021-09-22 ASSESSMENT — PATIENT HEALTH QUESTIONNAIRE - PHQ9
5. POOR APPETITE OR OVEREATING: SEVERAL DAYS
SUM OF ALL RESPONSES TO PHQ QUESTIONS 1-9: 12

## 2021-09-22 NOTE — LETTER
My Asthma Action Plan    Name: Danielle Thrasher   YOB: 1970  Date: 9/22/2021   My doctor: Mehnaz Cabrera MD   My clinic: [facility]        My Rescue Medicine:   Albuterol inhaler (Proair/Ventolin/Proventil HFA)  2-4 puffs EVERY 4 HOURS as needed. Use a spacer if recommended by your provider.   My Asthma Severity:   Intermittent / Exercise Induced  Know your asthma triggers: upper respiratory infections  pollens          GREEN ZONE   Good Control    I feel good    No cough or wheeze    Can work, sleep and play without asthma symptoms       Take your asthma control medicine every day.     1. If exercise triggers your asthma, take your rescue medication    15 minutes before exercise or sports, and    During exercise if you have asthma symptoms  2. Spacer to use with inhaler: If you have a spacer, make sure to use it with your inhaler             YELLOW ZONE Getting Worse  I have ANY of these:    I do not feel good    Cough or wheeze    Chest feels tight    Wake up at night   1. Keep taking your Green Zone medications  2. Start taking your rescue medicine:    every 20 minutes for up to 1 hour. Then every 4 hours for 24-48 hours.  3. If you stay in the Yellow Zone for more than 12-24 hours, contact your doctor.  4. If you do not return to the Green Zone in 12-24 hours or you get worse, start taking your oral steroid medicine if prescribed by your provider.           RED ZONE Medical Alert - Get Help  I have ANY of these:    I feel awful    Medicine is not helping    Breathing getting harder    Trouble walking or talking    Nose opens wide to breathe       1. Take your rescue medicine NOW  2. If your provider has prescribed an oral steroid medicine, start taking it NOW  3. Call your doctor NOW  4. If you are still in the Red Zone after 20 minutes and you have not reached your doctor:    Take your rescue medicine again and    Call 911 or go to the emergency room right away    See  your regular doctor within 2 weeks of an Emergency Room or Urgent Care visit for follow-up treatment.          Annual Reminders:  Meet with Asthma Educator,  Flu Shot in the Fall, consider Pneumonia Vaccination for patients with asthma (aged 19 and older).    Pharmacy:    [id number] - [place] - [address] AT [facility] [address]  [facility] - [address]    Electronically signed by Mehnaz Cabrera MD   Date: 09/22/21                    Asthma Triggers  How To Control Things That Make Your Asthma Worse    Triggers are things that make your asthma worse.  Look at the list below to help you find your triggers and   what you can do about them. You can help prevent asthma flare-ups by staying away from your triggers.      Trigger                                                          What you can do   Cigarette Smoke  Tobacco smoke can make asthma worse. Do not allow smoking in your home, car or around you.  Be sure no one smokes at a child s day care or school.  If you smoke, ask your health care provider for ways to help you quit.  Ask family members to quit too.  Ask your health care provider for a referral to Quit Plan to help you quit smoking, or call 3-995-257-PLAN.     Colds, Flu, Bronchitis  These are common triggers of asthma. Wash your hands often.  Don t touch your eyes, nose or mouth.  Get a flu shot every year.     Dust Mites  These are tiny bugs that live in cloth or carpet. They are too small to see. Wash sheets and blankets in hot water every week.   Encase pillows and mattress in dust mite proof covers.  Avoid having carpet if you can. If you have carpet, vacuum weekly.   Use a dust mask and HEPA vacuum.   Pollen and Outdoor Mold  Some people are allergic to trees, grass, or weed pollen, or molds. Try to keep your windows closed.  Limit time out doors when pollen count is high.   Ask you health care provider about  taking medicine during allergy season.     Animal Dander  Some people are allergic to skin flakes, urine or saliva from pets with fur or feathers. Keep pets with fur or feathers out of your home.    If you can t keep the pet outdoors, then keep the pet out of your bedroom.  Keep the bedroom door closed.  Keep pets off cloth furniture and away from stuffed toys.     Mice, Rats, and Cockroaches  Some people are allergic to the waste from these pests.   Cover food and garbage.  Clean up spills and food crumbs.  Store grease in the refrigerator.   Keep food out of the bedroom.   Indoor Mold  This can be a trigger if your home has high moisture. Fix leaking faucets, pipes, or other sources of water.   Clean moldy surfaces.  Dehumidify basement if it is damp and smelly.   Smoke, Strong Odors, and Sprays  These can reduce air quality. Stay away from strong odors and sprays, such as perfume, powder, hair spray, paints, smoke incense, paint, cleaning products, candles and new carpet.   Exercise or Sports  Some people with asthma have this trigger. Be active!  Ask your doctor about taking medicine before sports or exercise to prevent symptoms.    Warm up for 5-10 minutes before and after sports or exercise.     Other Triggers of Asthma  Cold air:  Cover your nose and mouth with a scarf.  Sometimes laughing or crying can be a trigger.  Some medicines and food can trigger asthma.

## 2021-09-22 NOTE — PROGRESS NOTES
Danielle is a 51 year old who is being evaluated via a billable video visit.      How would you like to obtain your AVS? MyChart  If the video visit is dropped, the invitation should be resent by: Text to cell phone: [phone]  Will anyone else be joining your video visit? No      Video Start Time: 9:09 am    Assessment & Plan     BRIDGET (generalized anxiety disorder)  Marital dysfunction  - citalopram (CELEXA) 20 MG tablet; Take 1 tablet (20 mg) by mouth daily  - MENTAL HEALTH REFERRAL  - Adult; Outpatient Treatment; Individual/Couples/Family/Group Therapy/Health Psychology; [facility] [phone]; We will contact you to schedule the appointment or please call with any questions; Future  - Follow up in one month or sooner for worsening of symptoms or side effects.     Intermittent asthma, uncomplicated  - albuterol (PROAIR HFA/PROVENTIL HFA/VENTOLIN HFA) 108 (90 Base) MCG/ACT inhaler; Inhale 2 puffs into the lungs every 4 hours as needed for shortness of breath / dyspnea or wheezing    Visit for screening mammogram  - MA SCREENING DIGITAL BILAT - Future  (s+30); Future             See Patient Instructions    Return in about 1 year (around 9/22/2022) for physical.    Mehnaz Cabrera MD  [facility]    Dahlia Santos is a 51 year old who presents for the following health issues  accompanied by her self:    HPI     Anxiety Follow-Up    How are you doing with your anxiety since your last visit? Worsened     Are you having other symptoms that might be associated with anxiety? No    Have you had a significant life event? Health Concerns     Are you feeling depressed? Yes:  low energy and sleeping less    Do you have any concerns with your use of alcohol or other drugs? No    Social History     Tobacco Use     Smoking status: Never Smoker     Smokeless tobacco: Never Used     Tobacco comment: non-smoking household   Vaping Use     Vaping Use: Never used   Substance Use Topics      Alcohol use: Yes     Alcohol/week: 2.5 standard drinks     Comment: glass of wine 2-3 times weekly with 1 liquor drink on the weekend     Drug use: No     BRIDGET-7 SCORE 5/1/2019 6/8/2020 9/22/2021   Total Score 2 9 10     PHQ 11/2/2016 11/10/2016 9/22/2021   PHQ-9 Total Score 3 4 12   Q9: Thoughts of better off dead/self-harm past 2 weeks Not at all Not at all Not at all     Last PHQ-9 9/22/2021   1.  Little interest or pleasure in doing things 1   2.  Feeling down, depressed, or hopeless 3   3.  Trouble falling or staying asleep, or sleeping too much 3   4.  Feeling tired or having little energy 3   5.  Poor appetite or overeating 0   6.  Feeling bad about yourself 1   7.  Trouble concentrating 1   8.  Moving slowly or restless 0   Q9: Thoughts of better off dead/self-harm past 2 weeks 0   PHQ-9 Total Score 12   Difficulty at work, home, or with people Very difficult     BRIDGET-7  9/22/2021   1. Feeling nervous, anxious, or on edge 0   2. Not being able to stop or control worrying 2   3. Worrying too much about different things 2   4. Trouble relaxing 1   5. Being so restless that it is hard to sit still 0   6. Becoming easily annoyed or irritable 2   7. Feeling afraid, as if something awful might happen 3   BRIDGET-7 Total Score 10   If you checked any problems, how difficult have they made it for you to do your work, take care of things at home, or get along with other people? Very difficult       Asthma Follow-Up    Was ACT completed today?    Yes    ACT Total Scores 4/12/2021   ACT TOTAL SCORE -   ASTHMA ER VISITS -   ASTHMA HOSPITALIZATIONS -   ACT TOTAL SCORE (Goal Greater than or Equal to 20) 25   In the past 12 months, how many times did you visit the emergency room for your asthma without being admitted to the hospital? 0   In the past 12 months, how many times were you hospitalized overnight because of your asthma? 0          How many days per week do you miss taking your asthma controller medication?   0    Please describe any recent triggers for your asthma: pollens    Have you had any Emergency Room Visits, Urgent Care Visits, or Hospital Admissions since your last office visit?  No      How many servings of fruits and vegetables do you eat daily?  4 or more    On average, how many sweetened beverages do you drink each day (Examples: soda, juice, sweet tea, etc.  Do NOT count diet or artificially sweetened beverages)?   0    How many days per week do you exercise enough to make your heart beat faster? 4-5    How many minutes a day do you exercise enough to make your heart beat faster? 30 - 60    How many days per week do you miss taking your medication? 0        Review of Systems         Objective           Vitals:  No vitals were obtained today due to virtual visit.    Physical Exam   GENERAL: alert and no distress  EYES: Eyes grossly normal to inspection.  No discharge or erythema, or obvious scleral/conjunctival abnormalities.  RESP: No audible wheeze, cough, or visible cyanosis.  No visible retractions or increased work of breathing.    SKIN: Visible skin clear. No significant rash, abnormal pigmentation or lesions.  NEURO: Cranial nerves grossly intact.  Mentation and speech appropriate for age.  PSYCH: mentation appears normal, affect flat, tearful, anxious, judgement and insight intact and appearance well groomed    Virtual Visit on 04/12/2021   Component Date Value Ref Range Status     COLOGUARD-ABSTRACT 05/10/2021 Negative   Final               Video-Visit Details    Type of service:  Video Visit    Video End Time:9:24 am    Originating Location (pt. Location): Home    Distant Location (provider location):  [facility]     Platform used for Video Visit: [email or web address]

## 2021-09-23 ASSESSMENT — ANXIETY QUESTIONNAIRES: GAD7 TOTAL SCORE: 10

## 2021-09-27 ENCOUNTER — MYC MEDICAL ADVICE (OUTPATIENT)
Dept: FAMILY MEDICINE | Facility: CLINIC | Age: 51
End: 2021-09-27

## 2021-10-11 ASSESSMENT — PATIENT HEALTH QUESTIONNAIRE - PHQ9
SUM OF ALL RESPONSES TO PHQ QUESTIONS 1-9: 14
SUM OF ALL RESPONSES TO PHQ QUESTIONS 1-9: 14
10. IF YOU CHECKED OFF ANY PROBLEMS, HOW DIFFICULT HAVE THESE PROBLEMS MADE IT FOR YOU TO DO YOUR WORK, TAKE CARE OF THINGS AT HOME, OR GET ALONG WITH OTHER PEOPLE: SOMEWHAT DIFFICULT

## 2021-10-11 ASSESSMENT — ANXIETY QUESTIONNAIRES
5. BEING SO RESTLESS THAT IT IS HARD TO SIT STILL: NOT AT ALL
7. FEELING AFRAID AS IF SOMETHING AWFUL MIGHT HAPPEN: SEVERAL DAYS
8. IF YOU CHECKED OFF ANY PROBLEMS, HOW DIFFICULT HAVE THESE MADE IT FOR YOU TO DO YOUR WORK, TAKE CARE OF THINGS AT HOME, OR GET ALONG WITH OTHER PEOPLE?: SOMEWHAT DIFFICULT
1. FEELING NERVOUS, ANXIOUS, OR ON EDGE: MORE THAN HALF THE DAYS
GAD7 TOTAL SCORE: 10
7. FEELING AFRAID AS IF SOMETHING AWFUL MIGHT HAPPEN: SEVERAL DAYS
2. NOT BEING ABLE TO STOP OR CONTROL WORRYING: MORE THAN HALF THE DAYS
3. WORRYING TOO MUCH ABOUT DIFFERENT THINGS: MORE THAN HALF THE DAYS
GAD7 TOTAL SCORE: 10
4. TROUBLE RELAXING: SEVERAL DAYS
GAD7 TOTAL SCORE: 10
6. BECOMING EASILY ANNOYED OR IRRITABLE: MORE THAN HALF THE DAYS

## 2021-10-12 ENCOUNTER — VIRTUAL VISIT (OUTPATIENT)
Dept: PSYCHOLOGY | Facility: CLINIC | Age: 51
End: 2021-10-12
Attending: FAMILY MEDICINE
Payer: COMMERCIAL

## 2021-10-12 DIAGNOSIS — F41.1 GAD (GENERALIZED ANXIETY DISORDER): ICD-10-CM

## 2021-10-12 DIAGNOSIS — F43.21 ADJUSTMENT DISORDER WITH DEPRESSED MOOD: Primary | ICD-10-CM

## 2021-10-12 PROCEDURE — 90791 PSYCH DIAGNOSTIC EVALUATION: CPT | Mod: GT | Performed by: COUNSELOR

## 2021-10-12 ASSESSMENT — COLUMBIA-SUICIDE SEVERITY RATING SCALE - C-SSRS
1. IN THE PAST MONTH, HAVE YOU WISHED YOU WERE DEAD OR WISHED YOU COULD GO TO SLEEP AND NOT WAKE UP?: NO
TOTAL  NUMBER OF INTERRUPTED ATTEMPTS PAST 3 MONTHS: NO
2. HAVE YOU ACTUALLY HAD ANY THOUGHTS OF KILLING YOURSELF?: NO
TOTAL  NUMBER OF INTERRUPTED ATTEMPTS LIFETIME: NO
ATTEMPT PAST THREE MONTHS: NO
TOTAL  NUMBER OF ABORTED OR SELF INTERRUPTED ATTEMPTS PAST LIFETIME: NO
4. HAVE YOU HAD THESE THOUGHTS AND HAD SOME INTENTION OF ACTING ON THEM?: NO
6. HAVE YOU EVER DONE ANYTHING, STARTED TO DO ANYTHING, OR PREPARED TO DO ANYTHING TO END YOUR LIFE?: NO
1. IN THE PAST MONTH, HAVE YOU WISHED YOU WERE DEAD OR WISHED YOU COULD GO TO SLEEP AND NOT WAKE UP?: NO
TOTAL  NUMBER OF ABORTED OR SELF INTERRUPTED ATTEMPTS PAST 3 MONTHS: NO
5. HAVE YOU STARTED TO WORK OUT OR WORKED OUT THE DETAILS OF HOW TO KILL YOURSELF? DO YOU INTEND TO CARRY OUT THIS PLAN?: NO
4. HAVE YOU HAD THESE THOUGHTS AND HAD SOME INTENTION OF ACTING ON THEM?: NO
3. HAVE YOU BEEN THINKING ABOUT HOW YOU MIGHT KILL YOURSELF?: NO
5. HAVE YOU STARTED TO WORK OUT OR WORKED OUT THE DETAILS OF HOW TO KILL YOURSELF? DO YOU INTEND TO CARRY OUT THIS PLAN?: NO
6. HAVE YOU EVER DONE ANYTHING, STARTED TO DO ANYTHING, OR PREPARED TO DO ANYTHING TO END YOUR LIFE?: NO
ATTEMPT LIFETIME: NO
2. HAVE YOU ACTUALLY HAD ANY THOUGHTS OF KILLING YOURSELF LIFETIME?: NO

## 2021-10-12 ASSESSMENT — ANXIETY QUESTIONNAIRES: GAD7 TOTAL SCORE: 10

## 2021-10-12 ASSESSMENT — PATIENT HEALTH QUESTIONNAIRE - PHQ9: SUM OF ALL RESPONSES TO PHQ QUESTIONS 1-9: 14

## 2021-10-12 NOTE — PROGRESS NOTES
"[facility]  Provider Name:  Laura Joseph     Credentials:  LADC, LPCC    PATIENT'S NAME: Danielle Thrasher  PREFERRED NAME: Danielle or \"Anna\"  PRONOUNS: she, her, hers  MRN: [id number]  : 1970  ADDRESS: [address] [place] [id number]  ACCT. NUMBER:  [id number]  DATE OF SERVICE: 10/12/21  START TIME: 11:00am  END TIME: 11:50am  PREFERRED PHONE: [phone]  May we leave a program related message: Yes  SERVICE MODALITY:  Video Visit:      Provider verified identity through the following two step process.  Patient provided:  Patient  and Patient address    Telemedicine Visit: The patient's condition can be safely assessed and treated via synchronous audio and visual telemedicine encounter.      Reason for Telemedicine Visit: Services only offered telehealth    Originating Site (Patient Location): Patient's home    Distant Site (Provider Location): Provider Remote Setting- Home Office    Consent:  The patient/guardian has verbally consented to: the potential risks and benefits of telemedicine (video visit) versus in person care; bill my insurance or make self-payment for services provided; and responsibility for payment of non-covered services.     Patient would like the video invitation sent by:  My Chart    Mode of Communication:  Video Conference via well    As the provider I attest to compliance with applicable laws and regulations related to telemedicine.    UNIVERSAL ADULT Mental Health DIAGNOSTIC ASSESSMENT    Identifying Information:  Patient is a 51 year old, .  The pronoun use throughout this assessment reflects the patient's chosen pronoun.  Patient was referred for an assessment by primary care clinic.  Patient attended the session alone.     Chief Complaint:   The reason for seeking services at this time is: \"Depression\".  The problem(s) began 21 when she began to have turmoil within her marriage and isolation due to Covid and marital " "disagreements about Covid restrictions. Pt reports feeling isolated due to covid restriction expectations from .     Patient has not attempted to resolve these concerns in the past.    Social/Family History:  Patient reported they grew up in other [facility].  They were raised by biological parents  .  Parents were always together.  Patient reported that their childhood was \"very normal\" with both parents still living and two younger brothers.  Patient described their current relationships with family of origin as \"very good\".     The patient describes their cultural background as .  Cultural influences and impact on patient's life structure, values, norms, and healthcare: Katrin kid.  Contextual influences on patient's health include: Individual Factors -.    These factors will be addressed in the Preliminary Treatment plan. Patient identified their preferred language to be English. Patient reported they does not need the assistance of an  or other support involved in therapy.     Patient reported had no significant delays in developmental tasks.   Patient's highest education level was graduate school.  Patient identified the following learning problems: none reported.  Modifications will not be used to assist communication in therapy. Patient reports they are  able to understand written materials.    Patient reported the following relationship history.  Patient's current relationship status is  for 16 years and together for 14 years.   Patient identified their sexual orientation as heterosexual.  Patient reported having 0 child(casandra). Patient identified parents;pets;friends;spouse as part of their support system.  Patient identified the quality of these relationships as fair,  .      Patient's current living/housing situation involves staying in own home/apartment.  The immediate members of family and household include Sunil Thao, 52, and they report that housing is " stable.    Patient is currently employed fulltime.  Pt works 100% remote. Patient reports their finances are obtained through employment. Patient does not identify finances as a current stressor.      Patient reported that they have not been involved with the legal system. Patient does not report being under probation/ parole/ jurisdiction.     Patient's Strengths and Limitations:  Patient identified the following strengths or resources that will help them succeed in treatment: friends / good social support and motivation. Things that may interfere with the patient's success in treatment include: none identified.     Personal and Family Medical History:  Patient does not report a family history of mental health concerns.  Patient reports family history includes Breast Cancer (age of onset: 56) in her mother; C.A.D. (age of onset: 70) in her paternal grandfather; Cancer in her father, maternal grandfather, maternal grandmother, and mother; Colon Cancer in her paternal grandmother; Diabetes in her maternal grandfather; Heart Disease in her maternal grandfather..     Patient does report Mental Health Diagnosis and/or Treatment.  Patient Patient reported the following previous diagnoses which include(s): an Anxiety Disorder.  Patient reported symptoms began in grad school.   Patient has received mental health services in the past: no therapy in the past.  Psychiatric Hospitalizations: None.  Patient denies a history of civil commitment.  Patient is not receiving other mental health services.  These include none.         Patient has not had a physical exam to rule out medical causes for current symptoms.  Date of last physical exam was greater than a year ago and client was encouraged to schedule an exam with PCP. The patient has a [facility] Provider, who is named Mehnaz Cabrera..  Patient reports no current medical and/or dental concerns.  Patient denies any issues with pain..   There are not  significant appetite / nutritional concerns / weight changes.   Patient does not report a history of head injury / trauma / cognitive impairment.     Medication Adherence:  Patient reports taking.  taking prescribed medications as prescribed. See chart for medications.    Patient Allergies:  No Known Allergies    Medical History:    Past Medical History:   Diagnosis Date     Allergic rhinitis     mold     Major depressive disorder, recurrent episode (H) 2009     Mild persistent asthma      Patellofemoral pain syndrome      Tear meniscus knee 1998    left         Current Mental Status Exam:   Appearance:  Appropriate    Eye Contact:  Good   Psychomotor:  Normal       Gait / station:  no problem  Attitude / Demeanor: Cooperative   Speech      Rate / Production: Normal/ Responsive      Volume:  Normal  volume      Language:  intact  Mood:   Depressed  Sad   Affect:   Tearful   Thought Content: Clear   Thought Process: Coherent       Associations: No loosening of associations  Insight:   Fair   Judgment:  Intact   Orientation:  All  Attention/concentration: Good    Rating Scales:    PHQ9:    PHQ-9 SCORE 11/10/2016 9/22/2021 10/11/2021   PHQ-9 Total Score - - -   PHQ-9 Total Score MyChart - - 14 (Moderate depression)   PHQ-9 Total Score 4 12 14       GAD7:    BRIDGET-7 SCORE 6/8/2020 9/22/2021 10/11/2021   Total Score - - 10 (moderate anxiety)   Total Score 9 10 10     CGI:     First:Considering your total clinical experience with this particular patient population, how severe are the patient's symptoms at this time?: 4 (10/12/2021 12:00 PM)      Most recentCompared to the patient's condition at the START of treatment, this patient's condition is: 4 (10/12/2021 12:00 PM)      Substance Use:  Patient did not report a family history of substance use concerns; see medical history section for details.  Patient has not received chemical dependency treatment in the past.  Patient has not ever been to detox.      Patient is not  currently receiving any chemical dependency treatment.           Substance History of use Age of first use Date of last use     Pattern and duration of use (include amounts and frequency)   Alcohol currently use   19 10/09/21    Cannabis   never used          Amphetamines   never used        Cocaine/crack    never used          Hallucinogens never used            Inhalants never used            Heroin never used            Other Opiates never used        Benzodiazepine   never used        Barbiturates never used        Over the counter meds currently use 12 08/01/21    Caffeine currently use 12      Nicotine  never used        Other substances not listed above:  Identify:  never used          Patient reported the following problems as a result of their substance use: no problems, not applicable.     CAGE- AID:    CAGE-AID Total Score 10/11/2021   Total Score 0   Total Score MyChart 0 (A total score of 2 or greater is considered clinically significant)       Substance Use: No symptoms    Based on the negative CAGE score and clinical interview there  are not indications of drug or alcohol abuse.      Significant Losses / Trauma / Abuse / Neglect Issues:   Patient did not serve in the .  There are indications or report of significant loss, trauma, abuse or neglect issues related to: are no indications and client denies any losses, trauma, abuse, or neglect concerns.  Concerns for possible neglect are not present.    Safety Assessment:   Current Safety Concerns:  [place] Suicide Severity Rating Scale (Lifetime/Recent)  [place] Suicide Severity Rating (Lifetime/Recent) 10/12/2021   1. Wish to be Dead (Lifetime) No   1. Wish to be Dead (Recent) No   2. Non-Specific Active Suicidal Thoughts (Lifetime) No   2. Non-Specific Active Suicidal Thoughts (Recent) No   3. Active Suicidal Ideation with any Methods (Not Plan) Without Intent to Act (Lifetime) No   3. Active Suicidal Ideation with any Methods (Not Plan)  Without Intent to Act (Recent) No   4. Active Suicidal Ideation with Some Intent to Act, Without Specific Plan (Lifetime) No   4. Active Suicidal Ideation with Some Intent to Act, Without Specific Plan (Recent) No   5. Active Suicidal Ideation with Specific Plan and Intent (Lifetime) No   5. Active Suicidal Ideation with Specific Plan and Intent (Recent) No   Most Severe Ideation Rating (Lifetime) NA   Most Severe Ideation Description (Lifetime) n   Frequency (Lifetime) NA   Duration (Lifetime) NA   Controllability (Lifetime) NA   Protective Factors  (Lifetime) NA   Reasons for Ideation (Lifetime) NA   Most Severe Ideation Rating (Past Month) NA   Most Severe Ideation Description (Past Month) n   Frequency (Past Month) NA   Duration (Past Month) NA   Controllability (Past Month) NA   Protective Factors (Past Month) NA   Reasons for Ideation (Past Month) NA   Actual Attempt (Lifetime) No   Actual Attempt (Past 3 Months) No   Has subject engaged in non-suicidal self-injurious behavior? (Lifetime) No   Has subject engaged in non-suicidal self-injurious behavior? (Past 3 Months) No   Interrupted Attempts (Lifetime) No   Interrupted Attempts (Past 3 Months) No   Aborted or Self-Interrupted Attempt (Lifetime) No   Aborted or Self-Interrupted Attempt (Past 3 Months) No   Preparatory Acts or Behavior (Lifetime) No   Preparatory Acts or Behavior (Past 3 Months) No   Most Recent Attempt Actual Lethality Code NA   Most Lethal Attempt Actual Lethality Code NA   Initial/First Attempt Actual Lethality Code NA     Patient denies current homicidal ideation and behaviors.  Patient denies current self-injurious ideation and behaviors.    Patient denied risk behaviors associated with substance use.  Patient denies any high risk behaviors associated with mental health symptoms.  Patient reports the following current concerns for their personal safety: None.  Patient reports there are not firearms in the house.      History of Safety  Concerns:  Patient denied a history of homicidal ideation.     Patient denied a history of personal safety concerns.    Patient denied a history of assaultive behaviors.    Patient denied a history of sexual assault behaviors.     Patient denied a history of risk behaviors associated with substance use.  Patient denies any history of high risk behaviors associated with mental health symptoms.  Patient reports the following protective factors: forward or future oriented thinking;regular physical activity;living with other people;daily obligations;commitment to well being    Risk Plan:  See Recommendations for Safety and Risk Management Plan    Review of Symptoms per patient report:  Depression: Change in sleep, Excessive or inappropriate guilt, Change in energy level, Feelings of hopelessness, Feelings of helplessness and Feeling sad, down, or depressed  Zakiya:  No Symptoms  Psychosis: No Symptoms  Anxiety: Excessive worry, Nervousness, Physical complaints, such as headaches, stomachaches, muscle tension and Irritability  Panic:  Palpitations, Sense of impending doom and Hot or cold flashes - pt reports a couple times in the past when in graduate school  Post Traumatic Stress Disorder:  No Symptoms   Eating Disorder: No Symptoms  ADD / ADHD:  No symptoms  Conduct Disorder: No symptoms  Autism Spectrum Disorder: No symptoms  Obsessive Compulsive Disorder: No Symptoms    Patient reports the following compulsive behaviors and treatment history: none.      Diagnostic Criteria:   A. The development of emotional or behavioral symptoms in response to an identifiable stressor(s) occurring within 3 months of the onset of the stressor(s)  B. These symptoms or behaviors are clinically significant, as evidenced by one or both of the following:       - Significant impairment in social, occupational, or other important areas of functioning  C. The stress-related disturbance does not meet criteria for another disorder & is not not  an exacerbation of another mental disorder  D. The symptoms do not represent normal bereavement  E. Once the stressor or its consequences have terminated, the symptoms do not persist for more than an additional 6 months       * Adjustment Disorder with Depressed Mood: The predominant manifestations are symptoms such as low mood, tearfulness, or feelings of hopelessness    Functional Status:  Patient reports the following functional impairments: none reported.     WHODAS:   WHODAS 2.0 Total Score 10/11/2021   Total Score 23   Total Score MyChart 23     Nonprogrammatic care:  Patient is requesting basic services to address current mental health concerns.    Clinical Summary:  1. Reason for assessment: Patient is experiencing an increase in depression and anxiety due to the pandemic.  2. Psychosocial, Cultural and Contextual Factors: None reported .  3. Principal DSM5 Diagnoses  (Sustained by DSM5 Criteria Listed Above):   Adjustment Disorders  309.0 (F43.21) With depressed mood.  4. Other Diagnoses that is relevant to services:   300.02 (F41.1) Generalized Anxiety Disorder.  5. Provisional Diagnosis:  n/a  6. Prognosis: Return to Normal Functioning.  7. Likely consequences of symptoms if not treated: depression and anxiety will increase.  8. Client strengths include:  motivated and wants to learn .     Recommendations:     1. Plan for Safety and Risk Management:   Recommended that patient call 911 or go to the local ED should there be a change in any of these risk factors..          Report to child / adult protection services was NA.     2. Patient's identified none .     3. Initial Treatment will focus on:    Depressed Mood - patient's increased feelings of depression  Anxiety - patient's increased feelings of anxiety.     4. Resources/Service Plan:    services are not indicated.   Modifications to assist communication are not indicated.   Additional disability accommodations are not indicated.      5.  Collaboration:   Collaboration / coordination of treatment will be initiated with the following  support professionals: primary care physician.      6.  Referrals:   The following referral(s) will be initiated: none needed. Next Scheduled Appointment: next week.     A Release of Information has been obtained for the following: none needed.    7. JENN:    JENN:  Discussed the general effects of drugs and alcohol on health and well-being. Provider gave patient printed information about the effects of chemical use on their health and well being. Recommendations:  none .     8. Records:   These were reviewed at time of assessment.   Information in this assessment was obtained from the medical record and  provided by patient who is a good historian.    Patient will have open access to their mental health medical record.      Provider Name/ Credentials:  Laura Joseph MS, LADC, LPCC   October 12, 2021

## 2021-10-19 ENCOUNTER — MYC MEDICAL ADVICE (OUTPATIENT)
Dept: FAMILY MEDICINE | Facility: CLINIC | Age: 51
End: 2021-10-19

## 2021-10-19 ENCOUNTER — VIRTUAL VISIT (OUTPATIENT)
Dept: PSYCHOLOGY | Facility: CLINIC | Age: 51
End: 2021-10-19
Attending: FAMILY MEDICINE
Payer: COMMERCIAL

## 2021-10-19 DIAGNOSIS — F41.1 GAD (GENERALIZED ANXIETY DISORDER): ICD-10-CM

## 2021-10-19 DIAGNOSIS — F43.21 ADJUSTMENT DISORDER WITH DEPRESSED MOOD: Primary | ICD-10-CM

## 2021-10-19 PROCEDURE — 90834 PSYTX W PT 45 MINUTES: CPT | Mod: GT | Performed by: COUNSELOR

## 2021-10-19 NOTE — TELEPHONE ENCOUNTER
Patient to schedule with an alternative provider if appointment for depression is needed sooner.  Sent additional resources, as well.    Cayetano De Leon RN

## 2021-10-19 NOTE — PROGRESS NOTES
Progress Note    Patient Name: Danielle Thrasher  Date: 10/19/2021           Service Type: Individual      Session Start Time: 10:04am  Session End Time: 10:52     Session Length: 48 minutes    Session #: 2    Attendees: Client attended alone    Service Modality:  Video Visit:      Provider verified identity through the following two step process.  Patient provided:  Patient  and Patient address    Telemedicine Visit: The patient's condition can be safely assessed and treated via synchronous audio and visual telemedicine encounter.      Reason for Telemedicine Visit: Services only offered telehealth    Originating Site (Patient Location): Patient's home    Distant Site (Provider Location): [facility] HEALTH & ADDI[facility]    Consent:  The patient/guardian has verbally consented to: the potential risks and benefits of telemedicine (video visit) versus in person care; bill my insurance or make self-payment for services provided; and responsibility for payment of non-covered services.     Patient would like the video invitation sent by:  My Chart    Mode of Communication:  Video Conference via Amwell    As the provider I attest to compliance with applicable laws and regulations related to telemedicine.     Treatment Plan Last Reviewed: 10/19/2021 next review date 2021  PHQ-9 / BRIDGET-7 : will review next session    DATA  Interactive Complexity: No  Crisis: No       Progress Since Last Session (Related to Symptoms / Goals / Homework):   Symptoms: No change this pt's second therapy session    Homework: Completed in session      Episode of Care Goals: No improvement - PREPARATION (Decided to change - considering how); Intervened by negotiating a change plan and determining options / strategies for behavior change, identifying triggers, exploring social supports, and working towards setting a date to begin behavior change     Current /  "Ongoing Stressors and Concerns:  Pt spoke about ongoing feelings of depression and anxiety related to physically distancing herself due to covid precautions and her 's sleep apnea. Continues to feel isolated from friends and as if she is in a \"dark hole\". Pt reported continued feelings of depression, anxiety and marital disagreements.      Treatment Objective(s) Addressed in This Session:   Goal - Depression: Patient will alleviate depressive symptoms and return to previous level of effective functioning.  I will know I've met my goal when I don't feel in this hole anymore.     Objective #A (Client Action)  Patient will describe situations, thoughts, feelings, and actions associated with depression, their impact on functioning and attempts to resolve them. Patient will do this 4 out of 7 days of the week.   - Worked to identify triggers for depression    Objective #B  Patient will use cognitive strategies identified in therapy to challenge negative thought patterns 90% of the time.  - Worked to decrease pt's negative self talk    Objective #C  Patient will identify and use at least three coping skills and distraction and diversion activities to manage feelings of depression. Pt will use these skills at least three times per week.  - Worked to increase use of coping skills throughout the day/week.      Goal- Anxiety: Patient will reduce overall frequency, intensity, and duration of the anxiety so that daily functioning is not impaired.     I will know I've met my goal when I am less anxious.      Objective #A (Client Action)  Patient will describe situations, thoughts, feelings, and actions associated with anxieties and worries, their impact on functioning and attempts to resolve them. Patient will do this 4 out of 7 days of the week.    Worked to identify triggers for anxiety      Objective #B  Patient will use cognitive strategies identified in therapy to challenge negative thought patterns 90% of the " time.  - Worked to decrease pt's negative self talk    Objective #C  Patient will identify and use at least three coping skills and distraction and diversion activities to manage feelings of anxiety. Pt will use these skills at least three times per week.  - Worked to increase use of coping skills throughout the day/week.         Intervention:  Therapist met with patient to review goals and interventions. Therapist utilized reflected listening as patient gave brief reflection of week. Therapist utilized person centered therapy to develop treatment goals for therapy and to decrease depression and anxiety. Worked to increase behavior activation activities that will help patient cope with feelings of depression. Discussed the importance of a routine when working from home as well as a plan to mitigate depression when it increases in the evening. Therapist also suggested patient and  attend couples therapy to work through disagreements.           ASSESSMENT: Current Emotional / Mental Status (status of significant symptoms):   Risk status (Self / Other harm or suicidal ideation)   Patient denies current fears or concerns for personal safety.   Patient denies current or recent suicidal ideation or behaviors.   Patient denies current or recent homicidal ideation or behaviors.   Patient denies current or recent self injurious behavior or ideation.   Patient denies other safety concerns.   Patient reports there has been no change in risk factors since their last session.     Patient reports there has been no change in protective factors since their last session.     Recommended that patient call 911 or go to the local ED should there be a change in any of these risk factors.     Appearance:   Appropriate    Eye Contact:   Good    Psychomotor Behavior: Normal    Attitude:   Cooperative    Orientation:   All   Speech    Rate / Production: Normal     Volume:  Normal    Mood:    Sad    Affect:    Appropriate  Tearful  Worrisome    Thought Content:  Clear    Thought Form:  Coherent  Logical    Insight:    Fair      Medication Review:   No changes to current psychiatric medication(s)     Medication Compliance:   Yes     Changes in Health Issues:   None reported     Chemical Use Review:   Substance Use: Chemical use reviewed, no active concerns identified      Tobacco Use: No current tobacco use.      Diagnosis:  1. Adjustment disorder with depressed mood    2. BRIDGET (generalized anxiety disorder)        Collateral Reports Completed:   Not Applicable    PLAN: (Patient Tasks / Therapist Tasks / Other)  1)Patient will make an appointment with PCP to look into anti-depression medication.   2)Patient will integrate coping skills daily to help in managing feelings of depression (especially in the evening when depression increases).  3)Next session scheduled for next week.     MOSHE BUTLER, [facility]  10/19/2021                                                           ______________________________________________________________________    Treatment Plan    Patient's Name: Danielle Thrasher  YOB: 1970    Date: 10/19/2021      DSM5 Diagnoses: 300.02 (F41.1) Generalized Anxiety Disorder or Adjustment Disorders  309.0 (F43.21) With depressed mood  Psychosocial / Contextual Factors: Pt is a 51 year old female who works remote and lives with her .  WHODAS: 23    Referral / Collaboration:  none needed.    Anticipated number of session or this episode of care: 32-36      MeasurableTreatment Goal(s) related to diagnosis / functional impairment(s)  Goal - Depression: Patient will alleviate depressive symptoms and return to previous level of effective functioning.  I will know I've met my goal when I don't feel in this hole anymore.     Objective #A (Client Action)  Patient will describe situations, thoughts, feelings, and actions associated with depression, their impact on functioning and attempts to resolve them. Patient will  do this 4 out of 7 days of the week.     Intervention(s)  Therapist will provide psychoeducation, behavioral activation, and cognitive restructuring.    Status: New - Date: 10/19/2021    Objective #B  Patient will use cognitive strategies identified in therapy to challenge negative thought patterns 90% of the time.    Intervention(s)  Therapist will provide psychoeducation, behavioral activation, and cognitive restructuring.    Status: New - Date: 10/19/2021    Objective #C  Patient will identify and use at least three coping skills and distraction and diversion activities to manage feelings of depression. Pt will use these skills at least three times per week.    Intervention(s)  Therapist will provide psychoeducation, behavioral activation, and cognitive restructuring.    Status: New - Date: 10/19/2021      Goal- Anxiety: Patient will reduce overall frequency, intensity, and duration of the anxiety so that daily functioning is not impaired.     I will know I've met my goal when I am less anxious.      Objective #A (Client Action)  Patient will describe situations, thoughts, feelings, and actions associated with anxieties and worries, their impact on functioning and attempts to resolve them. Patient will do this 4 out of 7 days of the week.     Intervention(s)  Therapist will provide psychoeducation, behavioral activation, and cognitive restructuring.  Status: New - Date: 10/19/2021    Objective #B  Patient will use cognitive strategies identified in therapy to challenge negative thought patterns 90% of the time.    Intervention(s)  Therapist will provide psychoeducation, behavioral activation, and cognitive restructuring.  Status: New - Date: 10/19/2021    Objective #C  Patient will identify and use at least three coping skills and distraction and diversion activities to manage feelings of anxiety. Pt will use these skills at least three times per week.    Intervention(s)  Therapist will provide psychoeducation,  behavioral activation, and cognitive restructuring.  Status: New - Date: 10/19/2021          Patient has reviewed and agreed to the above plan.      MOSHE BUTLER, [facility]  October 19, 2021

## 2021-10-26 ENCOUNTER — VIRTUAL VISIT (OUTPATIENT)
Dept: PSYCHOLOGY | Facility: CLINIC | Age: 51
End: 2021-10-26
Attending: FAMILY MEDICINE
Payer: COMMERCIAL

## 2021-10-26 ENCOUNTER — IMMUNIZATION (OUTPATIENT)
Dept: NURSING | Facility: CLINIC | Age: 51
End: 2021-10-26
Payer: COMMERCIAL

## 2021-10-26 DIAGNOSIS — F41.1 GAD (GENERALIZED ANXIETY DISORDER): Primary | ICD-10-CM

## 2021-10-26 DIAGNOSIS — F43.21 ADJUSTMENT DISORDER WITH DEPRESSED MOOD: ICD-10-CM

## 2021-10-26 PROCEDURE — 0004A PR COVID VAC PFIZER DIL RECON 30 MCG/0.3 ML IM: CPT

## 2021-10-26 PROCEDURE — 91300 PR COVID VAC PFIZER DIL RECON 30 MCG/0.3 ML IM: CPT

## 2021-10-26 PROCEDURE — 90834 PSYTX W PT 45 MINUTES: CPT | Mod: GT | Performed by: COUNSELOR

## 2021-10-26 NOTE — PROGRESS NOTES
Progress Note    Patient Name: Danielle Thrasher  Date: 10/26/2021         Service Type: Individual      Session Start Time: 10:07am  Session End Time: 10:50am     Session Length: 38-52 min minutes    Session #: 3    Attendees: Client attended alone    Service Modality:  Video Visit:      Provider verified identity through the following two step process.  Patient provided:  Patient  and Patient address    Telemedicine Visit: The patient's condition can be safely assessed and treated via synchronous audio and visual telemedicine encounter.      Reason for Telemedicine Visit: Services only offered telehealth    Originating Site (Patient Location): Patient's home    Distant Site (Provider Location): [facility] MENT[facility] HEALTH & ADDI[facility]    Consent:  The patient/guardian has verbally consented to: the potential risks and benefits of telemedicine (video visit) versus in person care; bill my insurance or make self-payment for services provided; and responsibility for payment of non-covered services.     Patient would like the video invitation sent by:  My Chart    Mode of Communication:  Video Conference via Amwell    As the provider I attest to compliance with applicable laws and regulations related to telemedicine.     Treatment Plan Last Reviewed: 10/19/2021 next review date 2021  PHQ-9 / BRIDGET-7 : will review next session    DATA  Interactive Complexity: No  Crisis: No       Progress Since Last Session (Related to Symptoms / Goals / Homework):   Symptoms: No change this pt's second therapy session    Homework: Completed in session      Episode of Care Goals: No improvement - PREPARATION (Decided to change - considering how); Intervened by negotiating a change plan and determining options / strategies for behavior change, identifying triggers, exploring social supports, and working towards setting a date to begin behavior  change     Current / Ongoing Stressors and Concerns:  Pt spoke about ongoing feelings of depression and anxiety related to physically distancing herself due to covid precautions and her 's sleep apnea. Pt reported feeling better due to getting her booster shot and feeling hopeful to see people again. She stated feeling supported by her  as she has been more honest with how she is feeling and he has been able to validate her emotions.      Treatment Objective(s) Addressed in This Session:   Goal - Depression: Patient will alleviate depressive symptoms and return to previous level of effective functioning.  I will know I've met my goal when I don't feel in this hole anymore.     Objective #A (Client Action)  Patient will describe situations, thoughts, feelings, and actions associated with depression, their impact on functioning and attempts to resolve them. Patient will do this 4 out of 7 days of the week.   - Worked to identify triggers for depression    Objective #B  Patient will use cognitive strategies identified in therapy to challenge negative thought patterns 90% of the time.  - Worked to decrease pt's negative self talk    Objective #C  Patient will identify and use at least three coping skills and distraction and diversion activities to manage feelings of depression. Pt will use these skills at least three times per week.  - Worked to increase use of coping skills throughout the day/week.      Goal- Anxiety: Patient will reduce overall frequency, intensity, and duration of the anxiety so that daily functioning is not impaired.     I will know I've met my goal when I am less anxious.      Objective #A (Client Action)  Patient will describe situations, thoughts, feelings, and actions associated with anxieties and worries, their impact on functioning and attempts to resolve them. Patient will do this 4 out of 7 days of the week.   - Worked to identify triggers for anxiety    Objective #B  Patient  "will use cognitive strategies identified in therapy to challenge negative thought patterns 90% of the time.  - Worked to decrease pt's negative self talk    Objective #C  Patient will identify and use at least three coping skills and distraction and diversion activities to manage feelings of anxiety. Pt will use these skills at least three times per week.  - Worked to increase use of coping skills throughout the day/week.         Intervention:  Therapist met with patient to review goals and interventions. Therapist utilized reflected listening as patient gave brief reflection of week. Patient processed feeling better about her relationship with her  and optimistic about seeing people after her booster shot. However, she talked about an increase in anxiety related to re-entering into friendships. The patient specifically talked about a friendship with which she has experienced some relational frustration.     Utilized CBT modality to talk about how our thought processes shape the emotions we experience. Specifically, discussed core beliefs today in session.  Traditionally some of the most helpful ways of understanding core beliefs in CBT have been to see them as  lenses  or  prejudices . Another way of illustrating the effects of core beliefs (and, implicitly, the strategies to undermine them) is to think of them as magnets with attractor and repulsor properties - they will attract evidence that is consistent with the belief, and will actively repel disconfirmatory evidence. Discussed patient's negative core belief of \"I'm bad\" and how this has worked as a lens through which she experiences the world and relational interactions. Discussed how this core belief was shaped by her family of origin and how it increases her feelings of anxiety.         ASSESSMENT: Current Emotional / Mental Status (status of significant symptoms):   Risk status (Self / Other harm or suicidal ideation)   Patient denies current fears " or concerns for personal safety.   Patient denies current or recent suicidal ideation or behaviors.   Patient denies current or recent homicidal ideation or behaviors.   Patient denies current or recent self injurious behavior or ideation.   Patient denies other safety concerns.   Patient reports there has been no change in risk factors since their last session.     Patient reports there has been no change in protective factors since their last session.     Recommended that patient call 911 or go to the local ED should there be a change in any of these risk factors.     Appearance:   Appropriate    Eye Contact:   Good    Psychomotor Behavior: Normal    Attitude:   Cooperative    Orientation:   All   Speech    Rate / Production: Normal     Volume:  Normal    Mood:    Sad    Affect:    Appropriate  Tearful Worrisome    Thought Content:  Clear    Thought Form:  Coherent  Logical    Insight:    Fair      Medication Review:   No changes to current psychiatric medication(s)     Medication Compliance:   Yes     Changes in Health Issues:   None reported     Chemical Use Review:   Substance Use: Chemical use reviewed, no active concerns identified      Tobacco Use: No current tobacco use.      Diagnosis:  1. BRIDGET (generalized anxiety disorder)    2. Adjustment disorder with depressed mood        Collateral Reports Completed:   Not Applicable    PLAN: (Patient Tasks / Therapist Tasks / Other)  1)Patient will increase awareness of negative core belief at least three times per week.   2)Patient will develop a reframing statement to use instead of the negative core belief.   3)Next session scheduled for next week.     MOSHE BUTLER, [facility]  10/26/2021                                                             ______________________________________________________________________    Treatment Plan    Patient's Name: Danielle Thrasher  YOB: 1970    Date: 10/19/2021      DSM5 Diagnoses: 300.02 (F41.1) Generalized  Anxiety Disorder or Adjustment Disorders  309.0 (F43.21) With depressed mood  Psychosocial / Contextual Factors: Pt is a 51 year old female who works remote and lives with her .  WHODAS: 23    Referral / Collaboration:  none needed.    Anticipated number of session or this episode of care: 32-36      MeasurableTreatment Goal(s) related to diagnosis / functional impairment(s)  Goal - Depression: Patient will alleviate depressive symptoms and return to previous level of effective functioning.  I will know I've met my goal when I don't feel in this hole anymore.     Objective #A (Client Action)  Patient will describe situations, thoughts, feelings, and actions associated with depression, their impact on functioning and attempts to resolve them. Patient will do this 4 out of 7 days of the week.     Intervention(s)  Therapist will provide psychoeducation, behavioral activation, and cognitive restructuring.    Status: New - Date: 10/19/2021    Objective #B  Patient will use cognitive strategies identified in therapy to challenge negative thought patterns 90% of the time.    Intervention(s)  Therapist will provide psychoeducation, behavioral activation, and cognitive restructuring.    Status: New - Date: 10/19/2021    Objective #C  Patient will identify and use at least three coping skills and distraction and diversion activities to manage feelings of depression. Pt will use these skills at least three times per week.    Intervention(s)  Therapist will provide psychoeducation, behavioral activation, and cognitive restructuring.    Status: New - Date: 10/19/2021      Goal- Anxiety: Patient will reduce overall frequency, intensity, and duration of the anxiety so that daily functioning is not impaired.     I will know I've met my goal when I am less anxious.      Objective #A (Client Action)  Patient will describe situations, thoughts, feelings, and actions associated with anxieties and worries, their impact on  functioning and attempts to resolve them. Patient will do this 4 out of 7 days of the week.     Intervention(s)  Therapist will provide psychoeducation, behavioral activation, and cognitive restructuring.  Status: New - Date: 10/19/2021    Objective #B  Patient will use cognitive strategies identified in therapy to challenge negative thought patterns 90% of the time.    Intervention(s)  Therapist will provide psychoeducation, behavioral activation, and cognitive restructuring.  Status: New - Date: 10/19/2021    Objective #C  Patient will identify and use at least three coping skills and distraction and diversion activities to manage feelings of anxiety. Pt will use these skills at least three times per week.    Intervention(s)  Therapist will provide psychoeducation, behavioral activation, and cognitive restructuring.  Status: New - Date: 10/19/2021          Patient has reviewed and agreed to the above plan.      MOSHE BUTLER, [facility]  10/26/2021

## 2021-11-02 ENCOUNTER — VIRTUAL VISIT (OUTPATIENT)
Dept: PSYCHOLOGY | Facility: CLINIC | Age: 51
End: 2021-11-02
Payer: COMMERCIAL

## 2021-11-02 DIAGNOSIS — F41.1 GAD (GENERALIZED ANXIETY DISORDER): Primary | ICD-10-CM

## 2021-11-02 DIAGNOSIS — F43.21 ADJUSTMENT DISORDER WITH DEPRESSED MOOD: ICD-10-CM

## 2021-11-02 PROCEDURE — 90834 PSYTX W PT 45 MINUTES: CPT | Mod: GT | Performed by: COUNSELOR

## 2021-11-02 NOTE — PROGRESS NOTES
Progress Note    Patient Name: Danielle Thrasher  Date: 2021           Service Type: Individual      Session Start Time: 11:00am  Session End Time: 11:49am     Session Length: 38-52 min minutes    Session #: 4    Attendees: Client attended alone    Service Modality:  Video Visit:      Provider verified identity through the following two step process.  Patient provided:  Patient  and Patient address    Telemedicine Visit: The patient's condition can be safely assessed and treated via synchronous audio and visual telemedicine encounter.      Reason for Telemedicine Visit: Services only offered telehealth    Originating Site (Patient Location): Patient's home    Distant Site (Provider Location): [facility]    Consent:  The patient/guardian has verbally consented to: the potential risks and benefits of telemedicine (video visit) versus in person care; bill my insurance or make self-payment for services provided; and responsibility for payment of non-covered services.     Patient would like the video invitation sent by:  My Chart    Mode of Communication:  Video Conference via [employer]    As the provider I attest to compliance with applicable laws and regulations related to telemedicine.     Treatment Plan Last Reviewed: 10/19/2021 next review date 2021  PHQ-9 / BRIDGET-7 : will review next session    DATA  Interactive Complexity: No  Crisis: No       Progress Since Last Session (Related to Symptoms / Goals / Homework):   Symptoms: No change this pt's second therapy session    Homework: Completed in session      Episode of Care Goals: No improvement - PREPARATION (Decided to change - considering how); Intervened by negotiating a change plan and determining options / strategies for behavior change, identifying triggers, exploring social supports, and working towards setting a date to begin behavior  change     Current / Ongoing Stressors and Concerns:  Pt spoke about ongoing feelings of depression and anxiety related to physically distancing herself due to covid precautions and her 's sleep apnea. Patient reports increased anxiety related to relationships and a tendency towards perfectionism. Also reports depression related to experienced isolation due to covid.      Treatment Objective(s) Addressed in This Session:   Goal - Depression: Patient will alleviate depressive symptoms and return to previous level of effective functioning.  I will know I've met my goal when I don't feel in this hole anymore.     Objective #A (Client Action)  Patient will describe situations, thoughts, feelings, and actions associated with depression, their impact on functioning and attempts to resolve them. Patient will do this 4 out of 7 days of the week.   - Worked to identify triggers for depression    Objective #B  Patient will use cognitive strategies identified in therapy to challenge negative thought patterns 90% of the time.  - Worked to decrease pt's negative self talk    Objective #C  Patient will identify and use at least three coping skills and distraction and diversion activities to manage feelings of depression. Pt will use these skills at least three times per week.  - Worked to increase use of coping skills throughout the day/week.      Goal- Anxiety: Patient will reduce overall frequency, intensity, and duration of the anxiety so that daily functioning is not impaired.     I will know I've met my goal when I am less anxious.      Objective #A (Client Action)  Patient will describe situations, thoughts, feelings, and actions associated with anxieties and worries, their impact on functioning and attempts to resolve them. Patient will do this 4 out of 7 days of the week.   - Worked to identify triggers for anxiety    Objective #B  Patient will use cognitive strategies identified in therapy to challenge negative  "thought patterns 90% of the time.  - Worked to decrease pt's negative self talk    Objective #C  Patient will identify and use at least three coping skills and distraction and diversion activities to manage feelings of anxiety. Pt will use these skills at least three times per week.  - Worked to increase use of coping skills throughout the day/week.         Intervention:  Therapist met with patient to review goals and interventions. Therapist utilized reflected listening as patient gave brief reflection of week. Patient processed feeling better about her relationship with her  and optimistic about seeing people after her booster shot. However, she talked about an increase in anxiety related to re-entering into friendships. The patient specifically talked about a friendship with which she has experienced some relational frustration.     Utilized CBT modality to talk about how our thought processes shape the emotions we experience. Specifically, discussed core beliefs today in session. Continued to discussed patient's negative core belief of \"I'm bad\" and how this has worked as a lens through which she experiences the world and relational interactions. Discussed how this core belief was shaped by her family of origin and how it increases her feelings of anxiety. Patient has decided to use the reframe statement of \"I am a flawed human being who makes mistakes and I am worthy of love friendship\".  Lastly talked about cognitive distortions and how the patient engages them throughout her day.     ASSESSMENT: Current Emotional / Mental Status (status of significant symptoms):   Risk status (Self / Other harm or suicidal ideation)   Patient denies current fears or concerns for personal safety.   Patient denies current or recent suicidal ideation or behaviors.   Patient denies current or recent homicidal ideation or behaviors.   Patient denies current or recent self injurious behavior or ideation.   Patient denies " "other safety concerns.   Patient reports there has been no change in risk factors since their last session.     Patient reports there has been no change in protective factors since their last session.     Recommended that patient call 911 or go to the local ED should there be a change in any of these risk factors.     Appearance:   Appropriate    Eye Contact:   Good    Psychomotor Behavior: Normal    Attitude:   Cooperative    Orientation:   All   Speech    Rate / Production: Normal     Volume:  Normal    Mood:    Sad    Affect:    Appropriate  Tearful Worrisome    Thought Content:  Clear    Thought Form:  Coherent  Logical    Insight:    Fair      Medication Review:   No changes to current psychiatric medication(s)     Medication Compliance:   Yes     Changes in Health Issues:   None reported     Chemical Use Review:   Substance Use: Chemical use reviewed, no active concerns identified      Tobacco Use: No current tobacco use.      Diagnosis:  1. BRIDGET (generalized anxiety disorder)    2. Adjustment disorder with depressed mood        Collateral Reports Completed:   Not Applicable    PLAN: (Patient Tasks / Therapist Tasks / Other)  1)Patient will increase awareness of negative core belief at least three times per week.   2)Patient will look at Raad Salgado's work on shame and book of \"The Gifts of Imperfection\"  3)Next session scheduled for 2 weeks out      MOSHE BUTLER, [facility]  11/2/2021                                                               ______________________________________________________________________    Treatment Plan    Patient's Name: Danielle Thrasher  YOB: 1970    Date: 10/19/2021      DSM5 Diagnoses: 300.02 (F41.1) Generalized Anxiety Disorder or Adjustment Disorders  309.0 (F43.21) With depressed mood  Psychosocial / Contextual Factors: Pt is a 51 year old female who works remote and lives with her .  WHODAS: 23    Referral / Collaboration:  none needed.    Anticipated " number of session or this episode of care: 32-36      MeasurableTreatment Goal(s) related to diagnosis / functional impairment(s)  Goal - Depression: Patient will alleviate depressive symptoms and return to previous level of effective functioning.  I will know I've met my goal when I don't feel in this hole anymore.     Objective #A (Client Action)  Patient will describe situations, thoughts, feelings, and actions associated with depression, their impact on functioning and attempts to resolve them. Patient will do this 4 out of 7 days of the week.     Intervention(s)  Therapist will provide psychoeducation, behavioral activation, and cognitive restructuring.    Status: New - Date: 10/19/2021    Objective #B  Patient will use cognitive strategies identified in therapy to challenge negative thought patterns 90% of the time.    Intervention(s)  Therapist will provide psychoeducation, behavioral activation, and cognitive restructuring.    Status: New - Date: 10/19/2021    Objective #C  Patient will identify and use at least three coping skills and distraction and diversion activities to manage feelings of depression. Pt will use these skills at least three times per week.    Intervention(s)  Therapist will provide psychoeducation, behavioral activation, and cognitive restructuring.    Status: New - Date: 10/19/2021      Goal- Anxiety: Patient will reduce overall frequency, intensity, and duration of the anxiety so that daily functioning is not impaired.     I will know I've met my goal when I am less anxious.      Objective #A (Client Action)  Patient will describe situations, thoughts, feelings, and actions associated with anxieties and worries, their impact on functioning and attempts to resolve them. Patient will do this 4 out of 7 days of the week.     Intervention(s)  Therapist will provide psychoeducation, behavioral activation, and cognitive restructuring.  Status: New - Date: 10/19/2021    Objective #B  Patient  will use cognitive strategies identified in therapy to challenge negative thought patterns 90% of the time.    Intervention(s)  Therapist will provide psychoeducation, behavioral activation, and cognitive restructuring.  Status: New - Date: 10/19/2021    Objective #C  Patient will identify and use at least three coping skills and distraction and diversion activities to manage feelings of anxiety. Pt will use these skills at least three times per week.    Intervention(s)  Therapist will provide psychoeducation, behavioral activation, and cognitive restructuring.  Status: New - Date: 10/19/2021          Patient has reviewed and agreed to the above plan.      MOSHE BUTLER, [facility]  10/26/2021

## 2021-11-10 ENCOUNTER — VIRTUAL VISIT (OUTPATIENT)
Dept: FAMILY MEDICINE | Facility: CLINIC | Age: 51
End: 2021-11-10
Payer: COMMERCIAL

## 2021-11-10 DIAGNOSIS — F41.1 GAD (GENERALIZED ANXIETY DISORDER): Primary | ICD-10-CM

## 2021-11-10 PROCEDURE — 99214 OFFICE O/P EST MOD 30 MIN: CPT | Mod: GT | Performed by: FAMILY MEDICINE

## 2021-11-10 RX ORDER — CITALOPRAM HYDROBROMIDE 40 MG/1
40 TABLET ORAL DAILY
Qty: 90 TABLET | Refills: 3 | Status: SHIPPED | OUTPATIENT
Start: 2021-11-10 | End: 2022-10-18

## 2021-11-10 ASSESSMENT — ANXIETY QUESTIONNAIRES
2. NOT BEING ABLE TO STOP OR CONTROL WORRYING: MORE THAN HALF THE DAYS
IF YOU CHECKED OFF ANY PROBLEMS ON THIS QUESTIONNAIRE, HOW DIFFICULT HAVE THESE PROBLEMS MADE IT FOR YOU TO DO YOUR WORK, TAKE CARE OF THINGS AT HOME, OR GET ALONG WITH OTHER PEOPLE: VERY DIFFICULT
7. FEELING AFRAID AS IF SOMETHING AWFUL MIGHT HAPPEN: SEVERAL DAYS
1. FEELING NERVOUS, ANXIOUS, OR ON EDGE: MORE THAN HALF THE DAYS
GAD7 TOTAL SCORE: 10
3. WORRYING TOO MUCH ABOUT DIFFERENT THINGS: MORE THAN HALF THE DAYS
5. BEING SO RESTLESS THAT IT IS HARD TO SIT STILL: NOT AT ALL
6. BECOMING EASILY ANNOYED OR IRRITABLE: MORE THAN HALF THE DAYS

## 2021-11-10 ASSESSMENT — PATIENT HEALTH QUESTIONNAIRE - PHQ9: 5. POOR APPETITE OR OVEREATING: SEVERAL DAYS

## 2021-11-10 NOTE — PROGRESS NOTES
Danielle is a 51 year old who is being evaluated via a billable video visit.      How would you like to obtain your AVS? MyChart  If the video visit is dropped, the invitation should be resent by: Text to cell phone: [phone]  Will anyone else be joining your video visit? No      Video Start Time: 9:20 am    Assessment & Plan     BRIDGET (generalized anxiety disorder)  -therapy is helping, but symptoms are not in remission   -increase dose   - citalopram (CELEXA) 40 MG tablet; Take 1 tablet (40 mg) by mouth daily  - Follow up in one month or sooner for worsening of symptoms or side effects.       Return in about 1 month (around 12/10/2021) for anxiety, by Video Visit.    Mehnaz Cabrera MD  [facility]    Dahlia Santos is a 51 year old who presents for the following health issues     HPI     Depression Followup    How are you doing with your depression since your last visit? No change    Are you having other symptoms that might be associated with depression? Yes:  sad,fatigue, no interest    Have you had a significant life event?  No     Are you feeling anxious or having panic attacks?   Yes:  anxiety    Do you have any concerns with your use of alcohol or other drugs? No    Social History     Tobacco Use     Smoking status: Never Smoker     Smokeless tobacco: Never Used     Tobacco comment: non-smoking household   Vaping Use     Vaping Use: Never used   Substance Use Topics     Alcohol use: Yes     Alcohol/week: 2.5 standard drinks     Comment: glass of wine 2-3 times weekly with 1 liquor drink on the weekend     Drug use: No     PHQ 9/22/2021 10/11/2021 11/10/2021   PHQ-9 Total Score 12 14 15   Q9: Thoughts of better off dead/self-harm past 2 weeks Not at all Not at all Not at all     BRIDGET-7 SCORE 9/22/2021 10/11/2021 11/10/2021   Total Score - 10 (moderate anxiety) -   Total Score 10 10 10     Last PHQ-9 11/10/2021   1.  Little interest or pleasure in doing things 2   2.  Feeling down,  depressed, or hopeless 3   3.  Trouble falling or staying asleep, or sleeping too much 3   4.  Feeling tired or having little energy 2   5.  Poor appetite or overeating 1   6.  Feeling bad about yourself 2   7.  Trouble concentrating 2   8.  Moving slowly or restless 0   Q9: Thoughts of better off dead/self-harm past 2 weeks 0   PHQ-9 Total Score 15   Difficulty at work, home, or with people Very difficult     BRIDGET-7  11/10/2021   1. Feeling nervous, anxious, or on edge 2   2. Not being able to stop or control worrying 2   3. Worrying too much about different things 2   4. Trouble relaxing 1   5. Being so restless that it is hard to sit still 0   6. Becoming easily annoyed or irritable 2   7. Feeling afraid, as if something awful might happen 1   BRIDGET-7 Total Score 10   If you checked any problems, how difficult have they made it for you to do your work, take care of things at home, or get along with other people? Very difficult       Suicide Assessment Five-step Evaluation and Treatment (SAFE-T)       How many servings of fruits and vegetables do you eat daily?  2-3    On average, how many sweetened beverages do you drink each day (Examples: soda, juice, sweet tea, etc.  Do NOT count diet or artificially sweetened beverages)?   0    How many days per week do you exercise enough to make your heart beat faster? 5    How many minutes a day do you exercise enough to make your heart beat faster? 30 - 60    How many days per week do you miss taking your medication? 0        Review of Systems         Objective           Vitals:  No vitals were obtained today due to virtual visit.    Physical Exam   GENERAL: Healthy, alert and no distress  EYES: Eyes grossly normal to inspection.  No discharge or erythema, or obvious scleral/conjunctival abnormalities.  RESP: No audible wheeze, cough, or visible cyanosis.  No visible retractions or increased work of breathing.    SKIN: Visible skin clear. No significant rash, abnormal  pigmentation or lesions.  NEURO: Cranial nerves grossly intact.  Mentation and speech appropriate for age.  PSYCH: Mentation appears normal, affect normal/bright, judgement and insight intact, normal speech and appearance well-groomed.                Video-Visit Details    Type of service:  Video Visit    Video End Time:9:38 am    Originating Location (pt. Location): Home    Distant Location (provider location):  [facility]     Platform used for Video Visit: [employer]

## 2021-11-11 ENCOUNTER — ANCILLARY PROCEDURE (OUTPATIENT)
Dept: MAMMOGRAPHY | Facility: CLINIC | Age: 51
End: 2021-11-11
Attending: FAMILY MEDICINE
Payer: COMMERCIAL

## 2021-11-11 ENCOUNTER — IMMUNIZATION (OUTPATIENT)
Dept: FAMILY MEDICINE | Facility: CLINIC | Age: 51
End: 2021-11-11
Payer: COMMERCIAL

## 2021-11-11 DIAGNOSIS — Z12.31 VISIT FOR SCREENING MAMMOGRAM: ICD-10-CM

## 2021-11-11 DIAGNOSIS — Z23 NEED FOR PROPHYLACTIC VACCINATION AND INOCULATION AGAINST INFLUENZA: Primary | ICD-10-CM

## 2021-11-11 PROCEDURE — 77063 BREAST TOMOSYNTHESIS BI: CPT | Mod: TC | Performed by: RADIOLOGY

## 2021-11-11 PROCEDURE — 90471 IMMUNIZATION ADMIN: CPT

## 2021-11-11 PROCEDURE — 77067 SCR MAMMO BI INCL CAD: CPT | Mod: TC | Performed by: RADIOLOGY

## 2021-11-11 PROCEDURE — 99207 PR NO CHARGE NURSE ONLY: CPT

## 2021-11-11 PROCEDURE — 90682 RIV4 VACC RECOMBINANT DNA IM: CPT

## 2021-11-11 ASSESSMENT — PATIENT HEALTH QUESTIONNAIRE - PHQ9: SUM OF ALL RESPONSES TO PHQ QUESTIONS 1-9: 15

## 2021-11-11 ASSESSMENT — ASTHMA QUESTIONNAIRES: ACT_TOTALSCORE: 20

## 2021-11-11 ASSESSMENT — ANXIETY QUESTIONNAIRES: GAD7 TOTAL SCORE: 10

## 2021-11-16 ENCOUNTER — VIRTUAL VISIT (OUTPATIENT)
Dept: PSYCHOLOGY | Facility: CLINIC | Age: 51
End: 2021-11-16
Payer: COMMERCIAL

## 2021-11-16 DIAGNOSIS — F41.1 GAD (GENERALIZED ANXIETY DISORDER): Primary | ICD-10-CM

## 2021-11-16 DIAGNOSIS — F43.21 ADJUSTMENT DISORDER WITH DEPRESSED MOOD: ICD-10-CM

## 2021-11-16 PROCEDURE — 90834 PSYTX W PT 45 MINUTES: CPT | Mod: GT | Performed by: COUNSELOR

## 2021-11-16 NOTE — PROGRESS NOTES
Progress Note    Patient Name: Danielle Thrasher  Date: 2021           Service Type: Individual      Session Start Time: 3:36pm  Session End Time: 4:23pm     Session Length: 38-52 min minutes    Session #: 5    Attendees: Client attended alone    Service Modality:  Video Visit:      Provider verified identity through the following two step process.  Patient provided:  Patient  and Patient address    Telemedicine Visit: The patient's condition can be safely assessed and treated via synchronous audio and visual telemedicine encounter.      Reason for Telemedicine Visit: Services only offered telehealth    Originating Site (Patient Location): Patient's home    Distant Site (Provider Location): Provider Remote Setting- Home Office    Consent:  The patient/guardian has verbally consented to: the potential risks and benefits of telemedicine (video visit) versus in person care; bill my insurance or make self-payment for services provided; and responsibility for payment of non-covered services.     Patient would like the video invitation sent by:  My Chart    Mode of Communication:  Video Conference via Amwell    As the provider I attest to compliance with applicable laws and regulations related to telemedicine.     Treatment Plan Last Reviewed: 10/19/2021 next review date 2021  PHQ-9 / BRIDGET-7 : will review next session    DATA  Interactive Complexity: No  Crisis: No       Progress Since Last Session (Related to Symptoms / Goals / Homework):   Symptoms: Improving Patient is working towards therapeutic goals    Homework: Completed in session      Episode of Care Goals: Minimal progress - ACTION (Actively working towards change); Intervened by reinforcing change plan / affirming steps taken     Current / Ongoing Stressors and Concerns:  Pt spoke about ongoing feelings of depression and anxiety related to physically distancing herself due to covid precautions and her  's sleep apnea. Patient reports increased anxiety related to relationships and a tendency towards perfectionism. Also reports depression related to experienced isolation due to covid. Patient reports looking forward to visiting her parents and then quarenteening after, however continues to struggle within relationship with .     Treatment Objective(s) Addressed in This Session:   Goal - Depression: Patient will alleviate depressive symptoms and return to previous level of effective functioning.  I will know I've met my goal when I don't feel in this hole anymore.     Objective #A (Client Action)  Patient will describe situations, thoughts, feelings, and actions associated with depression, their impact on functioning and attempts to resolve them. Patient will do this 4 out of 7 days of the week.   - Worked to identify triggers for depression    Objective #B  Patient will use cognitive strategies identified in therapy to challenge negative thought patterns 90% of the time.  - Worked to decrease pt's negative self talk    Objective #C  Patient will identify and use at least three coping skills and distraction and diversion activities to manage feelings of depression. Pt will use these skills at least three times per week.  - Worked to increase use of coping skills throughout the day/week.      Goal- Anxiety: Patient will reduce overall frequency, intensity, and duration of the anxiety so that daily functioning is not impaired.     I will know I've met my goal when I am less anxious.      Objective #A (Client Action)  Patient will describe situations, thoughts, feelings, and actions associated with anxieties and worries, their impact on functioning and attempts to resolve them. Patient will do this 4 out of 7 days of the week.   - Worked to identify triggers for anxiety    Objective #B  Patient will use cognitive strategies identified in therapy to challenge negative thought patterns 90% of the time.  -  "Worked to decrease pt's negative self talk    Objective #C  Patient will identify and use at least three coping skills and distraction and diversion activities to manage feelings of anxiety. Pt will use these skills at least three times per week.  - Worked to increase use of coping skills throughout the day/week.         Intervention:  Therapist met with patient to review goals and interventions. Therapist utilized reflected listening as patient gave brief reflection of week. Patient spoke about continued frustration and anxiety related to talking to her  about covid restrictions.     Utilized CBT modality to complete a daily mood journal. Worked to increase pt's awareness of negative thought processes engaged in that spur on the experience of distressing emotions. Took the example of talking to her  about covid restrictions and the fear and anxiety this brings up in the patient. Discussed the distressing emotions experienced, cognitive distortions engaged in, and worked to reframe the negative thought patterns. Also identified where her negative core belief of \"I am bad\" shows up in the negative thought pattern. Patient reported a decrease in sadness and anxiety after reframing her thought process. She will also call the scheduling team to schedule couple counseling.              ASSESSMENT: Current Emotional / Mental Status (status of significant symptoms):   Risk status (Self / Other harm or suicidal ideation)   Patient denies current fears or concerns for personal safety.   Patient denies current or recent suicidal ideation or behaviors.   Patient denies current or recent homicidal ideation or behaviors.   Patient denies current or recent self injurious behavior or ideation.   Patient denies other safety concerns.   Patient reports there has been no change in risk factors since their last session.     Patient reports there has been no change in protective factors since their last session.  "    Recommended that patient call 911 or go to the local ED should there be a change in any of these risk factors.     Appearance:   Appropriate    Eye Contact:   Good    Psychomotor Behavior: Normal    Attitude:   Cooperative    Orientation:   All   Speech    Rate / Production: Normal     Volume:  Normal    Mood:    Sad    Affect:    Appropriate  Tearful Worrisome    Thought Content:  Clear    Thought Form:  Coherent  Logical    Insight:    Fair      Medication Review:   No changes to current psychiatric medication(s)     Medication Compliance:   Yes     Changes in Health Issues:   None reported     Chemical Use Review:   Substance Use: Chemical use reviewed, no active concerns identified      Tobacco Use: No current tobacco use.      Diagnosis:  1. BRIDGET (generalized anxiety disorder)    2. Adjustment disorder with depressed mood        Collateral Reports Completed:   Not Applicable    PLAN: (Patient Tasks / Therapist Tasks / Other)  1)Patient will increase awareness of negative thought patterns at least three times per week.   2)Patient will schedule couples counseling with her  within the next 2 weeks  3)Next session scheduled for 2 weeks out      MOSHE BUTLER, [facility]  11/16/2021                                                                 ______________________________________________________________________    Treatment Plan    Patient's Name: Danielle Thrasher  YOB: 1970    Date: 10/19/2021      DSM5 Diagnoses: 300.02 (F41.1) Generalized Anxiety Disorder or Adjustment Disorders  309.0 (F43.21) With depressed mood  Psychosocial / Contextual Factors: Pt is a 51 year old female who works remote and lives with her .  WHODAS: 23    Referral / Collaboration:  none needed.    Anticipated number of session or this episode of care: 32-36      MeasurableTreatment Goal(s) related to diagnosis / functional impairment(s)  Goal - Depression: Patient will alleviate depressive symptoms and  return to previous level of effective functioning.  I will know I've met my goal when I don't feel in this hole anymore.     Objective #A (Client Action)  Patient will describe situations, thoughts, feelings, and actions associated with depression, their impact on functioning and attempts to resolve them. Patient will do this 4 out of 7 days of the week.     Intervention(s)  Therapist will provide psychoeducation, behavioral activation, and cognitive restructuring.    Status: New - Date: 10/19/2021    Objective #B  Patient will use cognitive strategies identified in therapy to challenge negative thought patterns 90% of the time.    Intervention(s)  Therapist will provide psychoeducation, behavioral activation, and cognitive restructuring.    Status: New - Date: 10/19/2021    Objective #C  Patient will identify and use at least three coping skills and distraction and diversion activities to manage feelings of depression. Pt will use these skills at least three times per week.    Intervention(s)  Therapist will provide psychoeducation, behavioral activation, and cognitive restructuring.    Status: New - Date: 10/19/2021      Goal- Anxiety: Patient will reduce overall frequency, intensity, and duration of the anxiety so that daily functioning is not impaired.     I will know I've met my goal when I am less anxious.      Objective #A (Client Action)  Patient will describe situations, thoughts, feelings, and actions associated with anxieties and worries, their impact on functioning and attempts to resolve them. Patient will do this 4 out of 7 days of the week.     Intervention(s)  Therapist will provide psychoeducation, behavioral activation, and cognitive restructuring.  Status: New - Date: 10/19/2021    Objective #B  Patient will use cognitive strategies identified in therapy to challenge negative thought patterns 90% of the time.    Intervention(s)  Therapist will provide psychoeducation, behavioral activation, and  cognitive restructuring.  Status: New - Date: 10/19/2021    Objective #C  Patient will identify and use at least three coping skills and distraction and diversion activities to manage feelings of anxiety. Pt will use these skills at least three times per week.    Intervention(s)  Therapist will provide psychoeducation, behavioral activation, and cognitive restructuring.  Status: New - Date: 10/19/2021          Patient has reviewed and agreed to the above plan.      MOSHE BUTLER, [facility]  10/26/2021

## 2021-12-13 ENCOUNTER — ANCILLARY PROCEDURE (OUTPATIENT)
Dept: MAMMOGRAPHY | Facility: CLINIC | Age: 51
End: 2021-12-13
Attending: FAMILY MEDICINE
Payer: COMMERCIAL

## 2021-12-13 ENCOUNTER — ANCILLARY PROCEDURE (OUTPATIENT)
Dept: ULTRASOUND IMAGING | Facility: CLINIC | Age: 51
End: 2021-12-13
Attending: FAMILY MEDICINE
Payer: COMMERCIAL

## 2021-12-13 DIAGNOSIS — R92.8 ABNORMAL MAMMOGRAM: ICD-10-CM

## 2021-12-13 PROCEDURE — 76642 ULTRASOUND BREAST LIMITED: CPT | Mod: RT | Performed by: RADIOLOGY

## 2021-12-13 PROCEDURE — 77061 BREAST TOMOSYNTHESIS UNI: CPT | Mod: RT | Performed by: RADIOLOGY

## 2021-12-13 PROCEDURE — 77065 DX MAMMO INCL CAD UNI: CPT | Mod: RT | Performed by: RADIOLOGY

## 2021-12-14 ENCOUNTER — VIRTUAL VISIT (OUTPATIENT)
Dept: PSYCHOLOGY | Facility: CLINIC | Age: 51
End: 2021-12-14
Payer: COMMERCIAL

## 2021-12-14 DIAGNOSIS — F41.1 GAD (GENERALIZED ANXIETY DISORDER): Primary | ICD-10-CM

## 2021-12-14 DIAGNOSIS — F43.21 ADJUSTMENT DISORDER WITH DEPRESSED MOOD: ICD-10-CM

## 2021-12-14 PROCEDURE — 90834 PSYTX W PT 45 MINUTES: CPT | Mod: GT | Performed by: COUNSELOR

## 2021-12-14 NOTE — PROGRESS NOTES
Progress Note    Patient Name: Danielle Thrasher  Date: .2021           Service Type: Individual      Session Start Time: 3:30pm  Session End Time: 4:20pm     Session Length: 38-52 min minutes    Session #: 6    Attendees: Client attended alone    Service Modality:  Video Visit:      Provider verified identity through the following two step process.  Patient provided:  Patient  and Patient address    Telemedicine Visit: The patient's condition can be safely assessed and treated via synchronous audio and visual telemedicine encounter.      Reason for Telemedicine Visit: Services only offered telehealth    Originating Site (Patient Location): Patient's home    Distant Site (Provider Location): Provider Remote Setting- Home Office    Consent:  The patient/guardian has verbally consented to: the potential risks and benefits of telemedicine (video visit) versus in person care; bill my insurance or make self-payment for services provided; and responsibility for payment of non-covered services.     Patient would like the video invitation sent by:  My Chart    Mode of Communication:  Video Conference via Amwell    As the provider I attest to compliance with applicable laws and regulations related to telemedicine.     Treatment Plan Last Reviewed: 10/19/2021 next review date 2021  PHQ-9 / BRIDGET-7 : will review next session    DATA  Interactive Complexity: No  Crisis: No       Progress Since Last Session (Related to Symptoms / Goals / Homework):   Symptoms: Improving Patient is working towards therapeutic goals    Homework: Completed in session      Episode of Care Goals: Minimal progress - ACTION (Actively working towards change); Intervened by reinforcing change plan / affirming steps taken     Current / Ongoing Stressors and Concerns:  Pt spoke about ongoing feelings of depression and anxiety related to physically distancing herself due to covid precautions and her  's sleep apnea. Patient reports increased anxiety related to relationships and a tendency towards perfectionism. Patient reports having a really good thanksgiving break with family. However received a possible issue with mammogram and will be waiting for the results from the second mammogram and ultrasounds. She continues to struggle with feelings of depression and anxiety related to isolation due to covid.     Treatment Objective(s) Addressed in This Session:   Goal - Depression: Patient will alleviate depressive symptoms and return to previous level of effective functioning.  I will know I've met my goal when I don't feel in this hole anymore.     Objective #A (Client Action)  Patient will describe situations, thoughts, feelings, and actions associated with depression, their impact on functioning and attempts to resolve them. Patient will do this 4 out of 7 days of the week.   - Worked to identify triggers for depression    Objective #B  Patient will use cognitive strategies identified in therapy to challenge negative thought patterns 90% of the time.  - Worked to decrease pt's negative self talk    Objective #C  Patient will identify and use at least three coping skills and distraction and diversion activities to manage feelings of depression. Pt will use these skills at least three times per week.  - Worked to increase use of coping skills throughout the day/week.      Goal- Anxiety: Patient will reduce overall frequency, intensity, and duration of the anxiety so that daily functioning is not impaired.     I will know I've met my goal when I am less anxious.      Objective #A (Client Action)  Patient will describe situations, thoughts, feelings, and actions associated with anxieties and worries, their impact on functioning and attempts to resolve them. Patient will do this 4 out of 7 days of the week.   - Worked to identify triggers for anxiety    Objective #B  Patient will use cognitive strategies  "identified in therapy to challenge negative thought patterns 90% of the time.  - Worked to decrease pt's negative self talk    Objective #C  Patient will identify and use at least three coping skills and distraction and diversion activities to manage feelings of anxiety. Pt will use these skills at least three times per week.  - Worked to increase use of coping skills throughout the day/week.         Intervention:  Therapist met with patient to review goals and interventions. Therapist utilized reflected listening as patient gave brief reflection of week. Patient spoke about continued frustration and anxiety related to talking to her  about covid restrictions. Discussed relational dynamics and the benefits of couples counseling.    Utilized CBT modality to complete a daily mood journal. Worked to increase pt's awareness of negative thought processes engaged in that spur on the experience of distressing emotions. Took the example of future fears related to covid variant. Discussed the distressing emotions experienced, cognitive distortions engaged in, and worked to reframe the negative thought patterns. Also identified where her negative core belief of \"I am bad\" shows up in the negative thought pattern. Patient reported a decrease in sadness and anxiety after reframing her thought process.          ASSESSMENT: Current Emotional / Mental Status (status of significant symptoms):   Risk status (Self / Other harm or suicidal ideation)   Patient denies current fears or concerns for personal safety.   Patient denies current or recent suicidal ideation or behaviors.   Patient denies current or recent homicidal ideation or behaviors.   Patient denies current or recent self injurious behavior or ideation.   Patient denies other safety concerns.   Patient reports there has been no change in risk factors since their last session.     Patient reports there has been no change in protective factors since their last " session.     Recommended that patient call 911 or go to the local ED should there be a change in any of these risk factors.     Appearance:   Appropriate    Eye Contact:   Good    Psychomotor Behavior: Normal    Attitude:   Cooperative    Orientation:   All   Speech    Rate / Production: Normal     Volume:  Normal    Mood:    Sad    Affect:    Appropriate  Tearful Worrisome    Thought Content:  Clear    Thought Form:  Coherent  Logical    Insight:    Fair      Medication Review:   No changes to current psychiatric medication(s)     Medication Compliance:   Yes     Changes in Health Issues:   None reported     Chemical Use Review:   Substance Use: Chemical use reviewed, no active concerns identified      Tobacco Use: No current tobacco use.      Diagnosis:  1. BRIDGET (generalized anxiety disorder)    2. Adjustment disorder with depressed mood        Collateral Reports Completed:   Not Applicable    PLAN: (Patient Tasks / Therapist Tasks / Other)  1)Patient will increase awareness of negative thought patterns at least three times per week.   2)Patient will talk to  again about couples counseling within the next 2 weeks  3)Next session scheduled for 3 weeks out      MOSHE BUTLER, [facility]  12/14/2021                                                                   ______________________________________________________________________    Treatment Plan    Patient's Name: Danielle Thrasher  YOB: 1970    Date: 10/19/2021      DSM5 Diagnoses: 300.02 (F41.1) Generalized Anxiety Disorder or Adjustment Disorders  309.0 (F43.21) With depressed mood  Psychosocial / Contextual Factors: Pt is a 51 year old female who works remote and lives with her .  WHODAS: 23    Referral / Collaboration:  none needed.    Anticipated number of session or this episode of care: 32-36      MeasurableTreatment Goal(s) related to diagnosis / functional impairment(s)  Goal - Depression: Patient will alleviate depressive  symptoms and return to previous level of effective functioning.  I will know I've met my goal when I don't feel in this hole anymore.     Objective #A (Client Action)  Patient will describe situations, thoughts, feelings, and actions associated with depression, their impact on functioning and attempts to resolve them. Patient will do this 4 out of 7 days of the week.     Intervention(s)  Therapist will provide psychoeducation, behavioral activation, and cognitive restructuring.    Status: New - Date: 10/19/2021    Objective #B  Patient will use cognitive strategies identified in therapy to challenge negative thought patterns 90% of the time.    Intervention(s)  Therapist will provide psychoeducation, behavioral activation, and cognitive restructuring.    Status: New - Date: 10/19/2021    Objective #C  Patient will identify and use at least three coping skills and distraction and diversion activities to manage feelings of depression. Pt will use these skills at least three times per week.    Intervention(s)  Therapist will provide psychoeducation, behavioral activation, and cognitive restructuring.    Status: New - Date: 10/19/2021      Goal- Anxiety: Patient will reduce overall frequency, intensity, and duration of the anxiety so that daily functioning is not impaired.     I will know I've met my goal when I am less anxious.      Objective #A (Client Action)  Patient will describe situations, thoughts, feelings, and actions associated with anxieties and worries, their impact on functioning and attempts to resolve them. Patient will do this 4 out of 7 days of the week.     Intervention(s)  Therapist will provide psychoeducation, behavioral activation, and cognitive restructuring.  Status: New - Date: 10/19/2021    Objective #B  Patient will use cognitive strategies identified in therapy to challenge negative thought patterns 90% of the time.    Intervention(s)  Therapist will provide psychoeducation, behavioral  activation, and cognitive restructuring.  Status: New - Date: 10/19/2021    Objective #C  Patient will identify and use at least three coping skills and distraction and diversion activities to manage feelings of anxiety. Pt will use these skills at least three times per week.    Intervention(s)  Therapist will provide psychoeducation, behavioral activation, and cognitive restructuring.  Status: New - Date: 10/19/2021          Patient has reviewed and agreed to the above plan.      MOSHE BUTLER, [facility]  10/26/2021

## 2021-12-22 ENCOUNTER — ANCILLARY PROCEDURE (OUTPATIENT)
Dept: ULTRASOUND IMAGING | Facility: CLINIC | Age: 51
End: 2021-12-22
Attending: FAMILY MEDICINE
Payer: COMMERCIAL

## 2021-12-22 ENCOUNTER — ANCILLARY PROCEDURE (OUTPATIENT)
Dept: MAMMOGRAPHY | Facility: CLINIC | Age: 51
End: 2021-12-22
Attending: FAMILY MEDICINE
Payer: COMMERCIAL

## 2021-12-22 ENCOUNTER — PATIENT OUTREACH (OUTPATIENT)
Dept: ONCOLOGY | Facility: CLINIC | Age: 51
End: 2021-12-22

## 2021-12-22 DIAGNOSIS — R92.8 ABNORMAL MAMMOGRAM: ICD-10-CM

## 2021-12-22 PROCEDURE — 88360 TUMOR IMMUNOHISTOCHEM/MANUAL: CPT | Performed by: RADIOLOGY

## 2021-12-22 PROCEDURE — 19084 BX BREAST ADD LESION US IMAG: CPT | Mod: RT | Performed by: RADIOLOGY

## 2021-12-22 PROCEDURE — 77066 DX MAMMO INCL CAD BI: CPT | Mod: GC | Performed by: RADIOLOGY

## 2021-12-22 PROCEDURE — 96374 THER/PROPH/DIAG INJ IV PUSH: CPT | Mod: GC | Performed by: RADIOLOGY

## 2021-12-22 PROCEDURE — 19083 BX BREAST 1ST LESION US IMAG: CPT | Mod: RT | Performed by: RADIOLOGY

## 2021-12-22 PROCEDURE — 88377 M/PHMTRC ALYS ISHQUANT/SEMIQ: CPT | Performed by: FAMILY MEDICINE

## 2021-12-22 PROCEDURE — 88341 IMHCHEM/IMCYTCHM EA ADD ANTB: CPT | Mod: 59 | Performed by: RADIOLOGY

## 2021-12-22 PROCEDURE — 88305 TISSUE EXAM BY PATHOLOGIST: CPT | Performed by: RADIOLOGY

## 2021-12-22 PROCEDURE — 88291 CYTO/MOLECULAR REPORT: CPT | Performed by: MEDICAL GENETICS

## 2021-12-22 PROCEDURE — 88342 IMHCHEM/IMCYTCHM 1ST ANTB: CPT | Mod: 59 | Performed by: RADIOLOGY

## 2021-12-22 PROCEDURE — 76642 ULTRASOUND BREAST LIMITED: CPT | Mod: RT | Performed by: RADIOLOGY

## 2021-12-22 RX ORDER — IOPAMIDOL 612 MG/ML
122 INJECTION, SOLUTION INTRATHECAL ONCE
Status: COMPLETED | OUTPATIENT
Start: 2021-12-22 | End: 2021-12-22

## 2021-12-22 RX ORDER — LIDOCAINE HYDROCHLORIDE AND EPINEPHRINE 10; 10 MG/ML; UG/ML
8 INJECTION, SOLUTION INFILTRATION; PERINEURAL ONCE
Status: DISCONTINUED | OUTPATIENT
Start: 2021-12-22 | End: 2021-12-22

## 2021-12-22 RX ADMIN — IOPAMIDOL 117 ML: 612 INJECTION, SOLUTION INTRATHECAL at 12:30

## 2021-12-22 NOTE — PROGRESS NOTES
New Patient Oncology Nurse Navigator Note     Referring provider: Mehnaz Cabrera MD     Referring Clinic/Organization:  in  ULTRASOUND     Referred to (specialty): Medical Oncology and Cancer Surgery      Date Referral Received: December 22, 2021     Evaluation for: TBD     Clinical History (per Nurse review of records provided):    Patient presented for bilateral screening mammogram on 11/11/21 and a possible architectural distortion was seen in the right breast at the 12 o'clock position, middle depth.  On 12/13/21, a right breast diagnostic mammogram and US were performed with the following findings:  Spot compression mammographic views of the right breast showed an inversion and retraction of the right nipple. In the  retroareolar and 12:00 position there are 2 areas of persistent architectural distortion. In the upper outer right breast, there are calcifications, which appear to layer on true lateral imaging.  Ultrasound:  In the retroareolar right breast there is an irregular hypoechoic mass with marked posterior acoustic shadowing, which is difficult to measure but measures approximately 1.8 x 2.5 x 1.6 cm. There appears to be invasion of the overlying nipple.   Adjacent to this at the 12:00 position 2 cm from the nipple there is a similar-appearing additional irregular spiculated mass, which measures 2.5 x 1.7 x 1.4 cm.   At the 11:00 position 3 cm from the nipple benign clustered microcysts with echogenic calcifications centrally are identified.  Targeted sonographic imaging of the right axilla demonstrates no suspicious lymphadenopathy.    Contrast enhanced mammogram was performed on 12/22.  There are 2 contiguous enhancing irregular masses in the right breast retroareolar anterior depth and 12:00 position middle depth. There is more avid enhancement of the 12:00 position mass. In total, the 2 masses measure approximately 4.0 x 3.5 x 3.1. There is an enhancement extending to the retracted  right nipple. Additionally, 3 small areas of enhancement at approximately 6:00, seen best on MLO view. No suspicious enhancement in the left breast.  Focused ultrasound of the 6 clock position displays an irregular 2.1 cm hypoechoic mass at 6:00 4 cm from the nipple and 1 cm irregular  hypoechoic mass at 6:00 2 cm from the nipple.     12/22/21 - US-guided core needle biopsies of two lesions (12 o'clock position, 2 cm FN, AND 6:00 position, 4 cm FN) were performed at [facility].  Results are pending.      Records Location: See Bookmarked material    Telephoned and left voice message for Danielle to assist in scheduling surgical consultation.  Pathology results are pending on biopsy performed today.

## 2021-12-23 NOTE — PROGRESS NOTES
Danielle returned call and we will move forward with options for medical and surgical oncology.  If necessary she is willing to travel to Dr. Krueger's office in [place] OR to Dr. Gilbert's office in [place]. Dr. Krueger was kind to add a consult on 12/30 at 2:30pm at  and [facility] to [facility].  We will have her see Dr. Huitron at  on 1/4 at 3:00pm with intention to move that earlier if HER2 available sooner and positive.

## 2021-12-27 ENCOUNTER — TELEPHONE (OUTPATIENT)
Dept: GENERAL RADIOLOGY | Facility: CLINIC | Age: 51
End: 2021-12-27
Payer: COMMERCIAL

## 2021-12-27 LAB
PATH REPORT.COMMENTS IMP SPEC: ABNORMAL
PATH REPORT.COMMENTS IMP SPEC: YES
PATH REPORT.FINAL DX SPEC: ABNORMAL
PATH REPORT.GROSS SPEC: ABNORMAL
PATH REPORT.MICROSCOPIC SPEC OTHER STN: ABNORMAL
PATH REPORT.RELEVANT HX SPEC: ABNORMAL
PATHOLOGY SYNOPTIC REPORT: ABNORMAL
PHOTO IMAGE: ABNORMAL

## 2021-12-27 NOTE — TELEPHONE ENCOUNTER
Spoke to patient regarding Right breast biopsies done on 12/22/21 with findings of Invasive lobular carcinoma . Notified patient that the Radiologist recommendation is Surgical and Oncologic consultation. Referral has alreay been placed and patient is scheduled to see Dr. Krueger 12/30/21 and  1/4/22. Patient verbalized understanding and all questions and concerns answered to patients satisfaction.

## 2021-12-28 LAB — INTERPRETATION: NORMAL

## 2021-12-30 ENCOUNTER — OFFICE VISIT (OUTPATIENT)
Dept: SURGERY | Facility: CLINIC | Age: 51
End: 2021-12-30
Attending: FAMILY MEDICINE
Payer: COMMERCIAL

## 2021-12-30 VITALS — BODY MASS INDEX: 29.3 KG/M2 | WEIGHT: 165.4 LBS | HEIGHT: 63 IN

## 2021-12-30 DIAGNOSIS — C50.511 MALIGNANT NEOPLASM OF LOWER-OUTER QUADRANT OF RIGHT BREAST OF FEMALE, ESTROGEN RECEPTOR POSITIVE (H): Primary | ICD-10-CM

## 2021-12-30 DIAGNOSIS — R92.8 ABNORMAL MAMMOGRAM: ICD-10-CM

## 2021-12-30 DIAGNOSIS — Z17.0 MALIGNANT NEOPLASM OF LOWER-OUTER QUADRANT OF RIGHT BREAST OF FEMALE, ESTROGEN RECEPTOR POSITIVE (H): Primary | ICD-10-CM

## 2021-12-30 PROCEDURE — 99205 OFFICE O/P NEW HI 60 MIN: CPT | Performed by: SURGERY

## 2021-12-30 ASSESSMENT — MIFFLIN-ST. JEOR: SCORE: 1326.44

## 2021-12-30 NOTE — PROGRESS NOTES
[facility] Breast Surgery Consultation    HPI:   Danielle Thrasher is a 51 year old female who is seen in consultation at the request of Dr. Cabrera for evaluation of newly diagnosed invasive lobular carcinoma, grade 2 with LCIS, ER 98%, LA 90%, HER 2 negative at 12:00, 2cm FN and at 6:00, 4cm FN.     She had presented for a screening mammogram on 11/11/2021 which revealed an architectural distortion in the right breast at 12:00.  Diagnostic imaging revealed inversion and retraction of the right nipple and two areas of architectural distortion as well as calcifications. US revealed an irregular hypoechoic mass in the retroareolar right breast measuring 1.8cm and adjacent to this is a similar mass at 12:00, 2cm FN measuring 2.5cm. US of the right axilla is normal.     Contrast enhanced mammogram revealed the 2 continuous masses in the right breast measuring 4cm with enhancement extending to the nipple. There were three small areas of enhancement at 6:00, US at this site revealed a 2.1cm mass at 6:00, 4cm FN and a 1cm mass at 6:00, 2cm FN.     She then had biopsy of the masses at 12:00 and 6:00 both of which revealed a grade 2 invasive lobular carcinoma.     Danielle reports she has noticed right nipple retraction very gradually over the course of the past couple years. She did not notice a mass in her breast. She has not had nipple drainage. no breast pain. No prior breast surgeries or biopsies.  Danielle is very active. She runs [place] races regularly and is scheduled for one in April. She also is an avid hiker and had a trip planned for [place] in February.     Hormonal history:  menarche 13, no children,  Post -menopausal, 30 years OCP use and current IUD, no HRT, no fertility treatment.     Family history of breast cancer: Yes - mother had at 53 and recurrence at 62  Family history of ovarian cancer:  No  Family history of colon cancer: No  Family history of prostate cancer: No      Past Medical  History:   has a past medical history of Allergic rhinitis, Major depressive disorder, recurrent episode (H) (2009), Mild persistent asthma, Patellofemoral pain syndrome, and Tear meniscus knee (1998).      Current Outpatient Medications:      acetaminophen (TYLENOL) 500 MG tablet, Take 1-2 tablets by mouth every 6 hours as needed for pain and fever., Disp: 100 tablet, Rfl: 11     albuterol (PROAIR HFA/PROVENTIL HFA/VENTOLIN HFA) 108 (90 Base) MCG/ACT inhaler, Inhale 2 puffs into the lungs every 4 hours as needed for shortness of breath / dyspnea or wheezing, Disp: 18 g, Rfl: 4     Ascorbic Acid (VITAMIN C PO), Take 500 mg by mouth, Disp: , Rfl:      calcium carbonate (OS-JACKSON 500 MG [place]. [place]) 500 MG tablet, Take 500 mg by mouth daily, Disp: , Rfl:      cetirizine (ZYRTEC) 10 MG tablet, Take 1 tablet by mouth daily., Disp: 90 tablet, Rfl: 3     citalopram (CELEXA) 40 MG tablet, Take 1 tablet (40 mg) by mouth daily, Disp: 90 tablet, Rfl: 3     fluticasone (FLONASE) 50 MCG/ACT nasal spray, Spray 1 spray into both nostrils daily, Disp: 16 g, Rfl: 11     levonorgestrel (MIRENA) 20 MCG/24HR IUD, 1 each (20 mcg) by Intrauterine route once for 1 dose, Disp: 1 each, Rfl: 0     Magnesium 400 MG TABS, Take by mouth daily, Disp: , Rfl:      Multiple Vitamin (MULTI-VITAMIN) per tablet, Take 1 tablet by mouth daily., Disp: 90 tablet, Rfl: 3     Omega-3 Fatty Acids (OMEGA-3 FISH OIL PO), Take by mouth daily, Disp: , Rfl:     Past Surgical History:  Past Surgical History:   Procedure Laterality Date     EXCISE GANGLION WRIST      LT     LASIK       TONSILLECTOMY & ADENOIDECTOMY           No Known Allergies     Social History:  Social History     Socioeconomic History     Marital status:      Spouse name: Sunil     Number of children: 0     Years of education: 18     Highest education level: Not on file   Occupational History     Occupation:      Employer: [employer]     Employer: [employer]  "  Tobacco Use     Smoking status: Never Smoker     Smokeless tobacco: Never Used     Tobacco comment: non-smoking household   Vaping Use     Vaping Use: Never used   Substance and Sexual Activity     Alcohol use: Yes     Alcohol/week: 2.5 standard drinks     Comment: glass of wine 2-3 times weekly with 1 liquor drink on the weekend     Drug use: No     Sexual activity: Yes     Partners: Male     Birth control/protection: I.U.D.   Other Topics Concern     Parent/sibling w/ CABG, MI or angioplasty before 65F 55M? Not Asked      Service No     Blood Transfusions No     Caffeine Concern No     Occupational Exposure No     Hobby Hazards No     Sleep Concern Yes     Comment: COMES AND GOES     Stress Concern No     Weight Concern Yes     Special Diet No     Back Care No     Exercise Yes     Comment: GYM MEMBERSHIP 3 HOURS WEEKLY     Bike Helmet Yes     Seat Belt Yes     Self-Exams Yes   Social History Narrative     Not on file     Social Determinants of Health     Financial Resource Strain: Not on file   Food Insecurity: Not on file   Transportation Needs: Not on file   Physical Activity: Not on file   Stress: Not on file   Social Connections: Not on file   Intimate Partner Violence: Not on file   Housing Stability: Not on file        ROS:  The 10 point review of systems is negative other than noted in the HPI and above.    PE:  Vitals: Ht 1.588 m (5' 2.5\")   Wt 75 kg (165 lb 6.4 oz)   BMI 29.77 kg/m    General appearance: well-nourished, sitting comfortably, no apparent distress  Psych: normal affect, pleasant  HEENT:  Head normocephalic and atraumatic, pupils equal and round, conjunctivae clear, mucous membranes moist, external ears and nose normal  Neck: Supple without thyromegaly or masses  Lungs: Respirations unlabored  Lymphatic: No cervical, or supraclavicular lymphadenopathy  Extremities: Without edema  Musculoskeletal:  Normal station and gait  Neurologic: nonfocal, grossly intact times four " extremities, alert and oriented times three  Psychiatric: Mood and affect are appropriate  Skin: Without lesions or rashes    Breast:  A bilateral breast exam was performed in the supine position.. Bilateral breasts were palpated in a circumferential clockwise fashion including the supraclavicular and axillary areas.   Right breast is smaller than left. The nipple is retracted on the right and inverted. There is a 4-5cm area of increased density directly behind NAC with indiscrete edges. There is mild skin ecchymosis at 6:00. No masses on the left. Nipple everted.     Lymph:       No supraclavicular/infraclavicular adenopathy.   Axillary adenopathy: none    Assessment:  Right breast Invasive lobular carcinoma, grade 2 with LCIS, ER 98%, ME 90%, HER 2 negative at 12:00, 2cm FN and at 6:00, 4cm FN.     Plan:   Danielle Thrasher is a 51 year old female has newly diagnosed right breast cancer.  I reviewed the imaging and pathology reports with her and her  and explained the findings.  We talked about the fact that this is invasive lobular carcinoma  that is large in size and was found on screening mammogram.   We discussed the receptor status of strongly Er/ME positive and HER 2 negative. We discussed that breast cancer is treated in a multidisciplinary fashion and she will also meet with oncology as well as radiation oncology pending surgical decision making and final pathology results. She has an appt with Dr. Huitron with medical oncology next week.     We next discussed the surgical options for treatment.  I described the procedures for lumpectomy with sentinel lymph node biopsy and mastectomy with sentinel lymph node biopsy, possible axillary node dissection including the details of the procedures, the risks, anesthesia and expected recovery.  Given the span of disease, I would recommend a mastectomy with SLNB. She is not a candidate for lumpectomy unfortunately.     I advised that lymph node biopsy is  recommended whenever we are dealing with invasive breast cancer and described the procedure for sentinel lymph node biopsy.  We talked about the risk for lymphedema which is small with removal of only a few nodes, but certainly not zero.     We talked about post-lumpectomy radiation, the course and usual side effects. We discussed that with lumpectomy, radiation is typically recommended to decrease risk of recurrence. It may be necessary following mastectomy depending on final pathology and if antonino involvement is present.     We also talked about post-mastectomy reconstruction and the stages involved. We also discussed the various types of mastectomy, including total, skin-sparing, and nipple-sparing mastectomy.  We reviewed that the nipple-sparing technique is cosmetic; sensation and contractility will likely be lost.  Danielle is NOT a candidate for nipple-sparing mastectomy from an oncologic perspective.  The option of having immediate versus delayed reconstruction was also discussed.   We reviewed that the advantages of immediate reconstruction includes superior cosmetics, as the skin is preserved.      In addition, I have recommended genetic counseling.  She would be a candidate in that situation based on her family history and new diagnosis.  The natural history of BRCA mutations and breast cancer were discussed with the patient. Should a deleterious mutation be identified, she would no longer be a good candidate for breast conservation.  We also reviewed the risk reduction benefits of a prophylactic mastectomy in this situation.      We discussed the role of oncotype for hormone positive, HER 2 negative cancers with negative sentinel nodes or 1-3 positive nodes.     Plan:   Plastic surgery referral (1/7 with Dr. Hernandez)  Coordinate surgery - plan for Right mastectomy with right SLNB  Appt with Dr. Huitron 1/4  Remove IUD  Genetics referral placed      60 minutes total time spent on the date of this  encounter doing: chart review, review of test results, patient visit, physical exam, education, counseling, developing plan of care, and documenting.    Yazmin Krueger MD      Please route or send letter to:  Primary Care Provider (PCP) and Referring Provider

## 2021-12-30 NOTE — TELEPHONE ENCOUNTER
RECORDS STATUS - BREAST    RECORDS REQUESTED FROM: EPIC   DATE REQUESTED: 12/30/21   NOTES DETAILS STATUS   OFFICE NOTE from referring provider EPIC 11/10/21: Dr. Mehnaz Cabrera   OFFICE NOTE from medical oncologist N/A    OFFICE NOTE from surgeon Epic 12/30/21: Dr. Yazmin Krueger   OFFICE NOTE from radiation oncologist N/A    DISCHARGE SUMMARY from hospital N/A    DISCHARGE REPORT from the ER N/A    OPERATIVE REPORT N/A    MEDICATION LIST [facility] 12/30/21   CLINICAL TRIAL TREATMENTS TO DATE N/A    LABS     REQUEST BLOCKS FOR ALL BREAST CANCER PTS     PATHOLOGY REPORTS  (Tissue diagnosis, Stage, ER/TX percentage positive and intensity of staining, HER2 IHC, FISH, and all biopsies from breast and any distant metastasis)                 [facility] 12/22/21: HER 2 MATTY FISH  12/22/21: [id number]   GENONOMIC TESTING     TYPE:   (Next Generation Sequencing, including Foundation One testing, and Oncotype score)     IMAGING (NEED IMAGES & REPORT)     CT SCANS     MRI     MAMMO PACS 12/22/21-03/05/18: Mammo   ULTRASOUND PACS 12/22/21-12/13/21: US Breast   PET     BONE SCAN     BRAIN MRI

## 2021-12-30 NOTE — NURSING NOTE
"Danielle Thrasher's goals for this visit include:   Chief Complaint   Patient presents with     Consult     Invasive lobular carcinoma       She requests these members of her care team be copied on today's visit information: Yes    PCP: Mehnaz Cabrera    Referring Provider:  Mehnaz Cabrera MD  [address] [facility] [address] [place] [address]    Ht 1.588 m (5' 2.5\")   Wt 75 kg (165 lb 6.4 oz)   BMI 29.77 kg/m      Do you need any medication refills at today's visit? No    Yadira Williamson LPN      "

## 2021-12-31 ENCOUNTER — TELEPHONE (OUTPATIENT)
Dept: SURGERY | Facility: CLINIC | Age: 51
End: 2021-12-31
Payer: COMMERCIAL

## 2021-12-31 ENCOUNTER — PATIENT OUTREACH (OUTPATIENT)
Dept: ONCOLOGY | Facility: CLINIC | Age: 51
End: 2021-12-31
Payer: COMMERCIAL

## 2022-01-03 ENCOUNTER — TELEPHONE (OUTPATIENT)
Dept: SURGERY | Facility: CLINIC | Age: 52
End: 2022-01-03
Payer: COMMERCIAL

## 2022-01-03 DIAGNOSIS — C50.911 MALIGNANT NEOPLASM OF RIGHT BREAST IN FEMALE, ESTROGEN RECEPTOR POSITIVE, UNSPECIFIED SITE OF BREAST (H): Primary | ICD-10-CM

## 2022-01-03 DIAGNOSIS — Z17.0 MALIGNANT NEOPLASM OF RIGHT BREAST IN FEMALE, ESTROGEN RECEPTOR POSITIVE, UNSPECIFIED SITE OF BREAST (H): Primary | ICD-10-CM

## 2022-01-03 NOTE — TELEPHONE ENCOUNTER
RN spoke with patient to review plan.     Scheduled for Medical Oncology 1/4.   Scheduled for plastics consult 1/7.     Dr. Krueger put in a consult with genetics at her consultation. Per pt report the earliest she could get in was the end of January and the results would affect her surgical decision. RN reached out to Dr. Krueger for plan.     Discussed IUD removal, pt plans for reach out to OBGYN to schedule removal.     Pt's questions answered. Will follow up about genetics.     Evelyne Hernandez 1/3/2022 9:35 AM

## 2022-01-03 NOTE — TELEPHONE ENCOUNTER
Dr. Krueger called pt and will have her come in to [place] for Genetic Testing.     Evelyne Hernandez 1/3/2022 3:17 PM

## 2022-01-03 NOTE — TELEPHONE ENCOUNTER
I called and discussed with Danielle that we should order the breast actionable panel to expedite results as this will change surgical decision making related to her breast cancer. I have placed the orders. She will stop at the office tomorrow to sign the consent and for the blood draw after her oncology appt.     Yazmin Krueger MD  [facility]  [phone]

## 2022-01-03 NOTE — TELEPHONE ENCOUNTER
Genetic testing authorization form at [facility]. Patient to come into clinic on 1/4/22 to sign.  staff instructed to return to LPN.    Yadira Williamson LPN

## 2022-01-04 ENCOUNTER — PRE VISIT (OUTPATIENT)
Dept: ONCOLOGY | Facility: CLINIC | Age: 52
End: 2022-01-04

## 2022-01-04 ENCOUNTER — ONCOLOGY VISIT (OUTPATIENT)
Dept: ONCOLOGY | Facility: CLINIC | Age: 52
End: 2022-01-04
Payer: COMMERCIAL

## 2022-01-04 VITALS
DIASTOLIC BLOOD PRESSURE: 76 MMHG | BODY MASS INDEX: 29.77 KG/M2 | OXYGEN SATURATION: 98 % | SYSTOLIC BLOOD PRESSURE: 113 MMHG | HEART RATE: 65 BPM | WEIGHT: 168 LBS | HEIGHT: 63 IN

## 2022-01-04 DIAGNOSIS — Z17.0 MALIGNANT NEOPLASM OF OVERLAPPING SITES OF LEFT BREAST IN FEMALE, ESTROGEN RECEPTOR POSITIVE (H): Primary | ICD-10-CM

## 2022-01-04 DIAGNOSIS — Z80.3 FAMILY HISTORY OF MALIGNANT NEOPLASM OF BREAST: ICD-10-CM

## 2022-01-04 DIAGNOSIS — C50.812 MALIGNANT NEOPLASM OF OVERLAPPING SITES OF LEFT BREAST IN FEMALE, ESTROGEN RECEPTOR POSITIVE (H): Primary | ICD-10-CM

## 2022-01-04 LAB
FSH SERPL-ACNC: 4.1 IU/L
INTERPRETATION: NORMAL
LAB PDF RESULT: NORMAL
SIGNIFICANT RESULTS: NORMAL
SPECIMEN DESCRIPTION: NORMAL
TEST DETAILS, MDL: NORMAL

## 2022-01-04 PROCEDURE — 83001 ASSAY OF GONADOTROPIN (FSH): CPT | Performed by: INTERNAL MEDICINE

## 2022-01-04 PROCEDURE — 99204 OFFICE O/P NEW MOD 45 MIN: CPT | Performed by: INTERNAL MEDICINE

## 2022-01-04 PROCEDURE — 83002 ASSAY OF GONADOTROPIN (LH): CPT | Mod: 90 | Performed by: INTERNAL MEDICINE

## 2022-01-04 PROCEDURE — 99000 SPECIMEN HANDLING OFFICE-LAB: CPT | Performed by: INTERNAL MEDICINE

## 2022-01-04 ASSESSMENT — MIFFLIN-ST. JEOR: SCORE: 1346.17

## 2022-01-04 ASSESSMENT — PAIN SCALES - GENERAL: PAINLEVEL: NO PAIN (0)

## 2022-01-04 NOTE — NURSING NOTE
"Oncology Rooming Note    January 4, 2022 3:04 PM   Danielle Thrasher is a 51 year old female who presents for:    Chief Complaint   Patient presents with     Oncology Clinic Visit     New abnormal mammogram     Initial Vitals: /76 (BP Location: Right arm, Patient Position: Chair, Cuff Size: Adult Large)   Pulse 65   Ht 1.6 m (5' 3\")   Wt 76.2 kg (168 lb)   SpO2 98%   BMI 29.76 kg/m   Estimated body mass index is 29.76 kg/m  as calculated from the following:    Height as of this encounter: 1.6 m (5' 3\").    Weight as of this encounter: 76.2 kg (168 lb). Body surface area is 1.84 meters squared.  No Pain (0) Comment: Data Unavailable   No LMP recorded. (Menstrual status: IUD).  Allergies reviewed: Yes  Medications reviewed: Yes    Medications: Medication refills not needed today.  Pharmacy name entered into G[facility]:    [id number] [address] AT [facility] [place]  [facility] - [place], [place] - [address] [place] [address]    Clinical concerns: NO Dr. Huitron was notified.      Yanique Landin CMA              "

## 2022-01-04 NOTE — LETTER
2022         RE: Danielle Thrasher  [address] [id number]      [facility] Hematology / Oncology  Initial Visit / Consultation Note 2022  Name: Danielle Thrasher  :  1970  MRN:  [id number]    --------------------    Assessment / Plan:  Over the course of our visit, Danielle, her  and I reviewed the natural history and management of what appears to be early stage but modestly sized invasive lobular breast cancer.  Right now she is planning on mastectomy appropriately given the size and span of the breast tumor in relation to her underlying breast tissue.  We reviewed that assuming a favorable pathology report and most importantly negative margins and negative antonino status, adjuvant radiotherapy would generally not be indicated.  We will plan to send Oncotype DX testing to best delineate the role of systemic chemotherapy; the majority of lobular breast cancers do not have a Oncotype score high enough to warrant systemic chemotherapy but given her young age as well as what could be multifocal disease, I feel it would be a best indication to send and guide decision making.  With her own personal history of breast cancer in a maternal history of breast cancer, I do think genetic testing is warranted and she has results/consult pending.  Adjuvant hormonal therapy will also be an important part of her care moving forward.  We reviewed the roles of tamoxifen and aromatase inhibitors as well as potentially ovarian suppression for high risk disease.  I would not recommend neoadjuvant therapy at this time as it would be unlikely to change her surgical management or downstage her tumor given the lobular nature and poor responses to systemic chemotherapy.  We will also plan to check her FSH/LH for menopausal status.  Ultimately, we will wait for her pathology report to finalize a plan of care adjuvantly.    Thank you kindly for this consultation.  Enrrique Huitron,  MD    --------------------    Interval History:  Danielle present for evaluation of newly diagnosed right-sided lobular breast cancer.  Looking back there was a very subtle nipple retraction gradually over the course of the past couple years.  There is no self palpated mass or nipple drainage.  No breast pain.  No prior breast biopsies or surgeries.  She is very active.  There is a family history of breast cancer in her mother at the age of 53 and a recurrence at 62.  No ovarian or colon cancer or prostate cancer.    Screening mammogram November 2021 showed heterogeneously dense breast tissue and a possible architectural distortion in the right breast at the 12 o'clock position, middle depth.        Diagnostic mammogram December 2021 showed inversion and retraction of the right nipple and 2 areas of persistent architectural distortion in the retroareolar and 12 o'clock position as well as calcifications in the upper outer right breast; accompanying breast ultrasound December 2021 showed an irregular hypoechoic mass in the retroareolar space measuring 18 x 25 x 16 mm with potential invasion of the overlying nipple as well as an additional irregular spiculated mass at the 12 o'clock position 2 cm from the nipple measuring 25 x 17 x 14 mm.  The right axilla had no suspicious lymphadenopathy.    Right breast biopsy December 2021 12 o'clock position 2 cm from the nipple showed invasive lobular carcinoma, grade 2 with associated lobular carcinoma in situ, classic type, estrogen receptor +98% strong, progesterone receptor +90% strong, HER-2 negative, Ki-67 15%    Right breast biopsy December 2021 6 o'clock position 4 cm from the nipple showed invasive lobular carcinoma, grade 2, estrogen receptor +90% strong, progesterone receptor +70% strong, HER-2 negative, Ki-67 5%.    Contrast-enhanced mammogram December 2021 revealed 2 contiguous enhancing irregular masses in the right breast at the retroareolar anterior depth in 12  o'clock position middle depth measuring a total of 40 x 35 x 31 mm with enhancement extending into the right nipple.        --------------------    Review of Systems:  10 point ROS negative except for that above.    Past Medical / Surgical History:  Past Medical History:   Diagnosis Date     Allergic rhinitis     mold     Major depressive disorder, recurrent episode (H) 2009     Mild persistent asthma      Patellofemoral pain syndrome      Tear meniscus knee 1998    left     Past Surgical History:   Procedure Laterality Date     EXCISE GANGLION WRIST      LT     LASIK       TONSILLECTOMY & ADENOIDECTOMY         Family History:  Family History   Problem Relation Age of Onset     Breast Cancer Mother 56     Cancer Mother         skin     Cancer Father         skin     Cancer Maternal Grandfather         liver     Diabetes Maternal Grandfather      Heart Disease Maternal Grandfather         questionable MI     Cancer Maternal Grandmother         stomach     C.A.D. Paternal Grandfather 70        MI     Colon Cancer Paternal Grandmother      Hypertension No family hx of      Cerebrovascular Disease No family hx of        Social History:  Social History     Socioeconomic History     Marital status:      Spouse name: Sunil     Number of children: 0     Years of education: 18     Highest education level: None   Occupational History     Occupation:      Employer: [employer]     Employer: [employer]   Tobacco Use     Smoking status: Never Smoker     Smokeless tobacco: Never Used     Tobacco comment: non-smoking household   Vaping Use     Vaping Use: Never used   Substance and Sexual Activity     Alcohol use: Yes     Alcohol/week: 2.5 standard drinks     Comment: glass of wine 2-3 times weekly with 1 liquor drink on the weekend     Drug use: No     Sexual activity: Yes     Partners: Male     Birth control/protection: I.U.D.   Other Topics Concern     Parent/sibling w/ CABG, MI or  "angioplasty before 65F 55M? Not Asked      Service No     Blood Transfusions No     Caffeine Concern No     Occupational Exposure No     Hobby Hazards No     Sleep Concern Yes     Comment: COMES AND GOES     Stress Concern No     Weight Concern Yes     Special Diet No     Back Care No     Exercise Yes     Comment: GYM MEMBERSHIP 3 HOURS WEEKLY     Bike Helmet Yes     Seat Belt Yes     Self-Exams Yes   Social History Narrative     None     Social Determinants of Health     Financial Resource Strain: Not on file   Food Insecurity: Not on file   Transportation Needs: Not on file   Physical Activity: Not on file   Stress: Not on file   Social Connections: Not on file   Intimate Partner Violence: Not on file   Housing Stability: Not on file       Medications / Allergies:  Reviewed in EMR.    --------------------    Physical Exam:  VS: /76 (BP Location: Right arm, Patient Position: Chair, Cuff Size: Adult Large)   Pulse 65   Ht 1.6 m (5' 3\")   Wt 76.2 kg (168 lb)   SpO2 98%   BMI 29.76 kg/m    GEN: Well appearing.    Labs / Imaging / Path:  We reviewed labs, personally reviewed imaging and reviewed pathology reports        Enrrique Huitron MD  "

## 2022-01-04 NOTE — TELEPHONE ENCOUNTER
Patient dropped off [facility] paperwork. Emailed to Denisse Paz at [facility].    Yadira Williamson LPN

## 2022-01-04 NOTE — TELEPHONE ENCOUNTER
Patient completed and signed Genetic Testing Authorization and sent to first floor lab.    Sent to [facility] for scanning.    Yadira Williamson LPN

## 2022-01-05 ENCOUNTER — VIRTUAL VISIT (OUTPATIENT)
Dept: PSYCHOLOGY | Facility: CLINIC | Age: 52
End: 2022-01-05
Payer: COMMERCIAL

## 2022-01-05 ENCOUNTER — OFFICE VISIT (OUTPATIENT)
Dept: MIDWIFE SERVICES | Facility: CLINIC | Age: 52
End: 2022-01-05
Payer: COMMERCIAL

## 2022-01-05 VITALS — WEIGHT: 168.4 LBS | BODY MASS INDEX: 29.83 KG/M2

## 2022-01-05 DIAGNOSIS — Z30.432 ENCOUNTER FOR REMOVAL OF INTRAUTERINE CONTRACEPTIVE DEVICE: Primary | ICD-10-CM

## 2022-01-05 DIAGNOSIS — F43.21 ADJUSTMENT DISORDER WITH DEPRESSED MOOD: ICD-10-CM

## 2022-01-05 DIAGNOSIS — F41.1 GAD (GENERALIZED ANXIETY DISORDER): Primary | ICD-10-CM

## 2022-01-05 PROCEDURE — 90834 PSYTX W PT 45 MINUTES: CPT | Mod: GT | Performed by: COUNSELOR

## 2022-01-05 PROCEDURE — 58301 REMOVE INTRAUTERINE DEVICE: CPT | Performed by: ADVANCED PRACTICE MIDWIFE

## 2022-01-05 NOTE — PROGRESS NOTES
Progress Note    Patient Name: Danielle Thrasher  Date: 2022           Service Type: Individual      Session Start Time: 2:04pm  Session End Time: 2:44pm     Session Length: 38-52 min minutes    Session #: 7    Attendees: Client attended alone    Service Modality:  Video Visit:      Provider verified identity through the following two step process.  Patient provided:  Patient  and Patient address    Telemedicine Visit: The patient's condition can be safely assessed and treated via synchronous audio and visual telemedicine encounter.      Reason for Telemedicine Visit: Services only offered telehealth    Originating Site (Patient Location): Patient's home    Distant Site (Provider Location): Provider Remote Setting- Home Office    Consent:  The patient/guardian has verbally consented to: the potential risks and benefits of telemedicine (video visit) versus in person care; bill my insurance or make self-payment for services provided; and responsibility for payment of non-covered services.     Patient would like the video invitation sent by:  My Chart    Mode of Communication:  Video Conference via [employer]    As the provider I attest to compliance with applicable laws and regulations related to telemedicine.     Treatment Plan Last Reviewed: 10/19/2021 next review date 2021  PHQ-9 / BRIDGET-7 : see epic updates    DATA  Interactive Complexity: No  Crisis: No       Progress Since Last Session (Related to Symptoms / Goals / Homework):   Symptoms: Improving Patient is working towards therapeutic goals    Homework: Completed in session      Episode of Care Goals: Minimal progress - ACTION (Actively working towards change); Intervened by reinforcing change plan / affirming steps taken     Current / Ongoing Stressors and Concerns:  Pt spoke about ongoing feelings of depression and anxiety related to physically distancing herself due to covid precautions and her 's  sleep apnea. Patient reports being diagnosed with stage (almost) 2 breast cancer. She processed feelings related to this change and fears about the future.      Treatment Objective(s) Addressed in This Session:   Goal - Depression: Patient will alleviate depressive symptoms and return to previous level of effective functioning.  I will know I've met my goal when I don't feel in this hole anymore.     Objective #A (Client Action)  Patient will describe situations, thoughts, feelings, and actions associated with depression, their impact on functioning and attempts to resolve them. Patient will do this 4 out of 7 days of the week.   - Worked to identify triggers for depression    Objective #B  Patient will use cognitive strategies identified in therapy to challenge negative thought patterns 90% of the time.  - Worked to decrease pt's negative self talk    Objective #C  Patient will identify and use at least three coping skills and distraction and diversion activities to manage feelings of depression. Pt will use these skills at least three times per week.  - Worked to increase use of coping skills throughout the day/week.      Goal- Anxiety: Patient will reduce overall frequency, intensity, and duration of the anxiety so that daily functioning is not impaired.     I will know I've met my goal when I am less anxious.      Objective #A (Client Action)  Patient will describe situations, thoughts, feelings, and actions associated with anxieties and worries, their impact on functioning and attempts to resolve them. Patient will do this 4 out of 7 days of the week.   - Worked to identify triggers for anxiety    Objective #B  Patient will use cognitive strategies identified in therapy to challenge negative thought patterns 90% of the time.  - Worked to decrease pt's negative self talk    Objective #C  Patient will identify and use at least three coping skills and distraction and diversion activities to manage feelings of  anxiety. Pt will use these skills at least three times per week.  - Worked to increase use of coping skills throughout the day/week.         Intervention:  Therapist met with patient to review goals and interventions. Therapist utilized reflective listening as patient gave brief reflection of week. Patient processed recent diagnosis of cancer and the emotions that have come up for her. Therapist worked to listen with empathy and provide the patient a space to process the emotions.  CBT: discussed how the patient has utilized the mood journal to reframe anxiety surrounding the future and continue to stay in the moment, taking it one step at a time. Also discussed the 5 stages of grief and the importance of allowing herself to go through it.       ASSESSMENT: Current Emotional / Mental Status (status of significant symptoms):   Risk status (Self / Other harm or suicidal ideation)   Patient denies current fears or concerns for personal safety.   Patient denies current or recent suicidal ideation or behaviors.   Patient denies current or recent homicidal ideation or behaviors.   Patient denies current or recent self injurious behavior or ideation.   Patient denies other safety concerns.   Patient reports there has been no change in risk factors since their last session.     Patient reports there has been no change in protective factors since their last session.     Recommended that patient call 911 or go to the local ED should there be a change in any of these risk factors.     Appearance:   Appropriate    Eye Contact:   Good    Psychomotor Behavior: Normal    Attitude:   Cooperative    Orientation:   All   Speech    Rate / Production: Normal     Volume:  Normal    Mood:    Sad    Affect:    Appropriate  Tearful   Thought Content:  Clear    Thought Form:  Coherent  Logical    Insight:    Fair      Medication Review:   No changes to current psychiatric medication(s)     Medication Compliance:   Yes     Changes in Health  Issues:   None reported     Chemical Use Review:   Substance Use: Chemical use reviewed, no active concerns identified      Tobacco Use: No current tobacco use.      Diagnosis:  1. BRIDGET (generalized anxiety disorder)    2. Adjustment disorder with depressed mood        Collateral Reports Completed:   Not Applicable    PLAN: (Patient Tasks / Therapist Tasks / Other)  1)Patient will increase coping skills for times of sadness, 3 times per week.  2)Patient will accept w/o judgement the emotions that come up for her, 3 times per week.  3)Next session scheduled for 2 weeks out      MOSHE BUTLER [facility]       1/5/2022                                                                     ______________________________________________________________________    Treatment Plan    Patient's Name: Danielle Thrasher  YOB: 1970    Date: 10/19/2021      DSM5 Diagnoses: 300.02 (F41.1) Generalized Anxiety Disorder or Adjustment Disorders  309.0 (F43.21) With depressed mood  Psychosocial / Contextual Factors: Pt is a 51 year old female who works remote and lives with her .  WHODAS: 23    Referral / Collaboration:  none needed.    Anticipated number of session or this episode of care: 32-36      MeasurableTreatment Goal(s) related to diagnosis / functional impairment(s)  Goal - Depression: Patient will alleviate depressive symptoms and return to previous level of effective functioning.  I will know I've met my goal when I don't feel in this hole anymore.     Objective #A (Client Action)  Patient will describe situations, thoughts, feelings, and actions associated with depression, their impact on functioning and attempts to resolve them. Patient will do this 4 out of 7 days of the week.     Intervention(s)  Therapist will provide psychoeducation, behavioral activation, and cognitive restructuring.    Status: New - Date: 10/19/2021    Objective #B  Patient will use cognitive strategies identified in therapy to  challenge negative thought patterns 90% of the time.    Intervention(s)  Therapist will provide psychoeducation, behavioral activation, and cognitive restructuring.    Status: New - Date: 10/19/2021    Objective #C  Patient will identify and use at least three coping skills and distraction and diversion activities to manage feelings of depression. Pt will use these skills at least three times per week.    Intervention(s)  Therapist will provide psychoeducation, behavioral activation, and cognitive restructuring.    Status: New - Date: 10/19/2021      Goal- Anxiety: Patient will reduce overall frequency, intensity, and duration of the anxiety so that daily functioning is not impaired.     I will know I've met my goal when I am less anxious.      Objective #A (Client Action)  Patient will describe situations, thoughts, feelings, and actions associated with anxieties and worries, their impact on functioning and attempts to resolve them. Patient will do this 4 out of 7 days of the week.     Intervention(s)  Therapist will provide psychoeducation, behavioral activation, and cognitive restructuring.  Status: New - Date: 10/19/2021    Objective #B  Patient will use cognitive strategies identified in therapy to challenge negative thought patterns 90% of the time.    Intervention(s)  Therapist will provide psychoeducation, behavioral activation, and cognitive restructuring.  Status: New - Date: 10/19/2021    Objective #C  Patient will identify and use at least three coping skills and distraction and diversion activities to manage feelings of anxiety. Pt will use these skills at least three times per week.    Intervention(s)  Therapist will provide psychoeducation, behavioral activation, and cognitive restructuring.  Status: New - Date: 10/19/2021          Patient has reviewed and agreed to the above plan.      MOSHE BUTLER, [facility]  10/26/2021

## 2022-01-05 NOTE — PROGRESS NOTES
IUD Removal:  SUBJECTIVE:    Is a pregnancy test required: No.  Was a consent obtained?  Yes    Danielle Thrasher is a 51 year old female,, No LMP recorded. (Menstrual status: IUD). who presents today for IUD removal. Her current IUD was placed in 2018. She has not had problems with the IUD. She requests removal of the IUD because she was recently diagnosed with breast cancer and needs it out to continue with treatment and hormone options. No other questions or concerns today.     Today's PHQ-2 Score:   PHQ-2 (  [employer]) 2021   Q1: Little interest or pleasure in doing things 1   Q2: Feeling down, depressed or hopeless 3   PHQ-2 Score 4   PHQ-2 Total Score (12-17 Years)- Positive if 3 or more points; Administer PHQ-A if positive 4   Q1: Little interest or pleasure in doing things -   Q2: Feeling down, depressed or hopeless -   PHQ-2 Score -       PROCEDURE:    A speculum exam was performed and the cervix was visualized. The IUD string was visualized. Using ring forceps, the string  was grasped and the IUD removed intact.    POST PROCEDURE:    The patient tolerated the procedure well. Patient was discharged in stable condition.    Call if bleeding, pain or fever occur.    FANNY Kothari CNM

## 2022-01-06 ENCOUNTER — MYC MEDICAL ADVICE (OUTPATIENT)
Dept: SURGERY | Facility: CLINIC | Age: 52
End: 2022-01-06
Payer: COMMERCIAL

## 2022-01-09 NOTE — PROGRESS NOTES
[facility] Hematology / Oncology  Initial Visit / Consultation Note 2022  Name: Danielle Thrasher  :  1970  MRN:  [id number]    --------------------    Assessment / Plan:  Over the course of our visit, Danielle, her  and I reviewed the natural history and management of what appears to be early stage but modestly sized invasive lobular breast cancer.  Right now she is planning on mastectomy appropriately given the size and span of the breast tumor in relation to her underlying breast tissue.  We reviewed that assuming a favorable pathology report and most importantly negative margins and negative antonino status, adjuvant radiotherapy would generally not be indicated.  We will plan to send Oncotype DX testing to best delineate the role of systemic chemotherapy; the majority of lobular breast cancers do not have a Oncotype score high enough to warrant systemic chemotherapy but given her young age as well as what could be multifocal disease, I feel it would be a best indication to send and guide decision making.  With her own personal history of breast cancer in a maternal history of breast cancer, I do think genetic testing is warranted and she has results/consult pending.  Adjuvant hormonal therapy will also be an important part of her care moving forward.  We reviewed the roles of tamoxifen and aromatase inhibitors as well as potentially ovarian suppression for high risk disease.  I would not recommend neoadjuvant therapy at this time as it would be unlikely to change her surgical management or downstage her tumor given the lobular nature and poor responses to systemic chemotherapy.  We will also plan to check her FSH/LH for menopausal status.  Ultimately, we will wait for her pathology report to finalize a plan of care adjuvantly.    Thank you kindly for this consultation.  Enrrique Huitron MD    --------------------    Interval History:  Danielle present for evaluation of newly  diagnosed right-sided lobular breast cancer.  Looking back there was a very subtle nipple retraction gradually over the course of the past couple years.  There is no self palpated mass or nipple drainage.  No breast pain.  No prior breast biopsies or surgeries.  She is very active.  There is a family history of breast cancer in her mother at the age of 53 and a recurrence at 62.  No ovarian or colon cancer or prostate cancer.    Screening mammogram November 2021 showed heterogeneously dense breast tissue and a possible architectural distortion in the right breast at the 12 o'clock position, middle depth.        Diagnostic mammogram December 2021 showed inversion and retraction of the right nipple and 2 areas of persistent architectural distortion in the retroareolar and 12 o'clock position as well as calcifications in the upper outer right breast; accompanying breast ultrasound December 2021 showed an irregular hypoechoic mass in the retroareolar space measuring 18 x 25 x 16 mm with potential invasion of the overlying nipple as well as an additional irregular spiculated mass at the 12 o'clock position 2 cm from the nipple measuring 25 x 17 x 14 mm.  The right axilla had no suspicious lymphadenopathy.    Right breast biopsy December 2021 12 o'clock position 2 cm from the nipple showed invasive lobular carcinoma, grade 2 with associated lobular carcinoma in situ, classic type, estrogen receptor +98% strong, progesterone receptor +90% strong, HER-2 negative, Ki-67 15%    Right breast biopsy December 2021 6 o'clock position 4 cm from the nipple showed invasive lobular carcinoma, grade 2, estrogen receptor +90% strong, progesterone receptor +70% strong, HER-2 negative, Ki-67 5%.    Contrast-enhanced mammogram December 2021 revealed 2 contiguous enhancing irregular masses in the right breast at the retroareolar anterior depth in 12 o'clock position middle depth measuring a total of 40 x 35 x 31 mm with enhancement  extending into the right nipple.        --------------------    Review of Systems:  10 point ROS negative except for that above.    Past Medical / Surgical History:  Past Medical History:   Diagnosis Date     Allergic rhinitis     mold     Major depressive disorder, recurrent episode (H) 2009     Mild persistent asthma      Patellofemoral pain syndrome      Tear meniscus knee 1998    left     Past Surgical History:   Procedure Laterality Date     EXCISE GANGLION WRIST      LT     LASIK       TONSILLECTOMY & ADENOIDECTOMY         Family History:  Family History   Problem Relation Age of Onset     Breast Cancer Mother 56     Cancer Mother         skin     Cancer Father         skin     Cancer Maternal Grandfather         liver     Diabetes Maternal Grandfather      Heart Disease Maternal Grandfather         questionable MI     Cancer Maternal Grandmother         stomach     C.A.D. Paternal Grandfather 70        MI     Colon Cancer Paternal Grandmother      Hypertension No family hx of      Cerebrovascular Disease No family hx of        Social History:  Social History     Socioeconomic History     Marital status:      Spouse name: Sunil     Number of children: 0     Years of education: 18     Highest education level: None   Occupational History     Occupation:      Employer: [employer]     Employer: [employer]   Tobacco Use     Smoking status: Never Smoker     Smokeless tobacco: Never Used     Tobacco comment: non-smoking household   Vaping Use     Vaping Use: Never used   Substance and Sexual Activity     Alcohol use: Yes     Alcohol/week: 2.5 standard drinks     Comment: glass of wine 2-3 times weekly with 1 liquor drink on the weekend     Drug use: No     Sexual activity: Yes     Partners: Male     Birth control/protection: I.U.D.   Other Topics Concern     Parent/sibling w/ CABG, MI or angioplasty before 65F 55M? Not Asked      Service No     Blood Transfusions No      "Caffeine Concern No     Occupational Exposure No     Hobby Hazards No     Sleep Concern Yes     Comment: COMES AND GOES     Stress Concern No     Weight Concern Yes     Special Diet No     Back Care No     Exercise Yes     Comment: GYM MEMBERSHIP 3 HOURS WEEKLY     Bike Helmet Yes     Seat Belt Yes     Self-Exams Yes   Social History Narrative     None     Social Determinants of Health     Financial Resource Strain: Not on file   Food Insecurity: Not on file   Transportation Needs: Not on file   Physical Activity: Not on file   Stress: Not on file   Social Connections: Not on file   Intimate Partner Violence: Not on file   Housing Stability: Not on file       Medications / Allergies:  Reviewed in EMR.    --------------------    Physical Exam:  VS: /76 (BP Location: Right arm, Patient Position: Chair, Cuff Size: Adult Large)   Pulse 65   Ht 1.6 m (5' 3\")   Wt 76.2 kg (168 lb)   SpO2 98%   BMI 29.76 kg/m    GEN: Well appearing.    Labs / Imaging / Path:  We reviewed labs, personally reviewed imaging and reviewed pathology reports  "

## 2022-01-09 NOTE — PATIENT INSTRUCTIONS
1) Send Oncotype with upcoming surgery.  2) BJT 3 weeks post-op to review pathology, Oncotype score and finalize treatment plan.  3) Genetics referral (if not already scheduled).

## 2022-01-12 ENCOUNTER — LAB (OUTPATIENT)
Dept: LAB | Facility: CLINIC | Age: 52
End: 2022-01-12
Payer: COMMERCIAL

## 2022-01-12 ENCOUNTER — TELEPHONE (OUTPATIENT)
Dept: LAB | Facility: CLINIC | Age: 52
End: 2022-01-12

## 2022-01-12 DIAGNOSIS — Z17.0 MALIGNANT NEOPLASM OF RIGHT BREAST IN FEMALE, ESTROGEN RECEPTOR POSITIVE, UNSPECIFIED SITE OF BREAST (H): Primary | ICD-10-CM

## 2022-01-12 DIAGNOSIS — C50.911 MALIGNANT NEOPLASM OF RIGHT BREAST IN FEMALE, ESTROGEN RECEPTOR POSITIVE, UNSPECIFIED SITE OF BREAST (H): Primary | ICD-10-CM

## 2022-01-12 PROCEDURE — 81162 BRCA1&2 GEN FULL SEQ DUP/DEL: CPT | Performed by: SURGERY

## 2022-01-12 NOTE — PROGRESS NOTES
Per the request of Lori Lopez, DEWAYNE, I reached out to Danielle to let her know that insurance approved [place] for her genetic testing. Danielle asked about when she can expect to have results, I let her know she should have test results within the next two weeks. Danielle had no further questions.    CARL Villagran  Genomics Billing    [facility]   Molecular Diagnostics Laboratory  [address] [place] [id number] [phone]

## 2022-01-13 ENCOUNTER — TELEPHONE (OUTPATIENT)
Dept: SURGERY | Facility: CLINIC | Age: 52
End: 2022-01-13
Payer: COMMERCIAL

## 2022-01-13 ENCOUNTER — MEDICAL CORRESPONDENCE (OUTPATIENT)
Dept: HEALTH INFORMATION MANAGEMENT | Facility: CLINIC | Age: 52
End: 2022-01-13
Payer: COMMERCIAL

## 2022-01-13 DIAGNOSIS — Z11.59 ENCOUNTER FOR SCREENING FOR OTHER VIRAL DISEASES: ICD-10-CM

## 2022-01-13 DIAGNOSIS — C50.911 MALIGNANT NEOPLASM OF RIGHT BREAST IN FEMALE, ESTROGEN RECEPTOR POSITIVE, UNSPECIFIED SITE OF BREAST (H): Primary | ICD-10-CM

## 2022-01-13 DIAGNOSIS — Z17.0 MALIGNANT NEOPLASM OF RIGHT BREAST IN FEMALE, ESTROGEN RECEPTOR POSITIVE, UNSPECIFIED SITE OF BREAST (H): Primary | ICD-10-CM

## 2022-01-13 NOTE — TELEPHONE ENCOUNTER
Type of surgery: Right skin sparing mastectomy with right sentinel lymph node biopsy  Location of surgery: [facility]  Date and time of surgery: 2/8/22 at 1:30p  Surgeon: Dr. Yazmin Krueger  Pre-Op Appt Date: Patient to schedule  Post-Op Appt Date: Patient to schedule   Packet sent out: Yes  Pre-cert/Authorization completed:  Not Applicable  Date: 1/13/22

## 2022-01-14 ENCOUNTER — PATIENT OUTREACH (OUTPATIENT)
Dept: ONCOLOGY | Facility: CLINIC | Age: 52
End: 2022-01-14
Payer: COMMERCIAL

## 2022-01-14 NOTE — PROGRESS NOTES
Patient will be having surgery on 2/8/22 at [facility] with Dr Krueger.right breast skin sparing mastectomy with right sentinel lymph node biopsy Paperwork for FMLA faxed to [phone] for Dr Krueger to complete. Pt informed via [employer] that paperwork faxed to Dr Krueger office.  Received [employer] verbalizing understanding.  Mary Jane Lu RN  Care Coordinator  [facility]

## 2022-01-17 LAB — SCANNED LAB RESULT: NORMAL

## 2022-01-19 ENCOUNTER — VIRTUAL VISIT (OUTPATIENT)
Dept: PSYCHOLOGY | Facility: CLINIC | Age: 52
End: 2022-01-19
Payer: COMMERCIAL

## 2022-01-19 DIAGNOSIS — F41.1 GAD (GENERALIZED ANXIETY DISORDER): Primary | ICD-10-CM

## 2022-01-19 DIAGNOSIS — F43.21 ADJUSTMENT DISORDER WITH DEPRESSED MOOD: ICD-10-CM

## 2022-01-19 PROCEDURE — G0452 MOLECULAR PATHOLOGY INTERPR: HCPCS | Mod: 26 | Performed by: PATHOLOGY

## 2022-01-19 PROCEDURE — 90834 PSYTX W PT 45 MINUTES: CPT | Mod: GT | Performed by: COUNSELOR

## 2022-01-19 NOTE — PROGRESS NOTES
Progress Note    Patient Name: Danielle Thrasher  Date: 2022           Service Type: Individual      Session Start Time: 1:00pm  Session End Time: 1:46pm     Session Length: 38-52 min minutes    Session #: 8    Attendees: Client attended alone    Service Modality:  Video Visit:      Provider verified identity through the following two step process.  Patient provided:  Patient  and Patient address    Telemedicine Visit: The patient's condition can be safely assessed and treated via synchronous audio and visual telemedicine encounter.      Reason for Telemedicine Visit: Services only offered telehealth    Originating Site (Patient Location): Patient's home    Distant Site (Provider Location): Provider Remote Setting- Home Office    Consent:  The patient/guardian has verbally consented to: the potential risks and benefits of telemedicine (video visit) versus in person care; bill my insurance or make self-payment for services provided; and responsibility for payment of non-covered services.     Patient would like the video invitation sent by:  My Chart    Mode of Communication:  Video Conference via [employer]well    As the provider I attest to compliance with applicable laws and regulations related to telemedicine.     Treatment Plan Last Reviewed: 2021 next review date 2022  PHQ-9 / BRIDGET-7 : see epic updates    DATA  Interactive Complexity: No  Crisis: No       Progress Since Last Session (Related to Symptoms / Goals / Homework):   Symptoms: Improving Patient is working towards therapeutic goals    Homework: Completed in session      Episode of Care Goals: Minimal progress - ACTION (Actively working towards change); Intervened by reinforcing change plan / affirming steps taken     Current / Ongoing Stressors and Concerns:  Patient reports being diagnosed with stage 2 breast cancer. She processed feelings related to this change and fears about the future. Patient  reports the surgery date is February 8th and processed the emotions that have been occurring for her over the last couple of weeks. Relationship with  is going better since the diagnosis.      Treatment Objective(s) Addressed in This Session:   Goal - Depression: Patient will alleviate depressive symptoms and return to previous level of effective functioning.  I will know I've met my goal when I don't feel in this hole anymore.     Objective #A (Client Action)  Patient will describe situations, thoughts, feelings, and actions associated with depression, their impact on functioning and attempts to resolve them. Patient will do this 4 out of 7 days of the week.   - Worked to identify triggers for depression    Objective #B  Patient will use cognitive strategies identified in therapy to challenge negative thought patterns 90% of the time.  - Worked to decrease pt's negative self talk    Objective #C  Patient will identify and use at least three coping skills and distraction and diversion activities to manage feelings of depression. Pt will use these skills at least three times per week.  - Worked to increase use of coping skills throughout the day/week.      Goal- Anxiety: Patient will reduce overall frequency, intensity, and duration of the anxiety so that daily functioning is not impaired.     I will know I've met my goal when I am less anxious.      Objective #A (Client Action)  Patient will describe situations, thoughts, feelings, and actions associated with anxieties and worries, their impact on functioning and attempts to resolve them. Patient will do this 4 out of 7 days of the week.   - Worked to identify triggers for anxiety    Objective #B  Patient will use cognitive strategies identified in therapy to challenge negative thought patterns 90% of the time.  - Worked to decrease pt's negative self talk    Objective #C  Patient will identify and use at least three coping skills and distraction and  diversion activities to manage feelings of anxiety. Pt will use these skills at least three times per week.  - Worked to increase use of coping skills throughout the day/week.       Intervention:  Therapist met with patient to review goals and interventions. Therapist utilized reflective listening as patient gave brief reflection of week. Patient processed recent diagnosis of cancer and the emotions that have come up for her. Therapist worked to listen with empathy and provide the patient a space to process the emotions.  Discussed the importance of processing through and allowing herself to feel the grief that comes up for her. Discussed mindfulness as being aware of what we are experiencing while we are experiencing it without judgement. Led client in a guided mindfulness exercise focusing on sensations, images, feelings, and thoughts.  Walked the patient through a specific breathing exercise to assist in sleep.       ASSESSMENT: Current Emotional / Mental Status (status of significant symptoms):   Risk status (Self / Other harm or suicidal ideation)   Patient denies current fears or concerns for personal safety.   Patient denies current or recent suicidal ideation or behaviors.   Patient denies current or recent homicidal ideation or behaviors.   Patient denies current or recent self injurious behavior or ideation.   Patient denies other safety concerns.   Patient reports there has been no change in risk factors since their last session.     Patient reports there has been no change in protective factors since their last session.     Recommended that patient call 911 or go to the local ED should there be a change in any of these risk factors.     Appearance:   Appropriate    Eye Contact:   Good    Psychomotor Behavior: Normal    Attitude:   Cooperative    Orientation:   All   Speech    Rate / Production: Normal     Volume:  Normal    Mood:    Sad    Affect:    Appropriate  Tearful   Thought Content:  Clear     Thought Form:  Coherent  Logical    Insight:    Fair      Medication Review:   No changes to current psychiatric medication(s)     Medication Compliance:   Yes     Changes in Health Issues:   None reported     Chemical Use Review:   Substance Use: Chemical use reviewed, no active concerns identified      Tobacco Use: No current tobacco use.      Diagnosis:  1. BRIDGET (generalized anxiety disorder)    2. Adjustment disorder with depressed mood        Collateral Reports Completed:   Not Applicable    PLAN: (Patient Tasks / Therapist Tasks / Other)  1)Patient will utilize mindfulness 3 times per week  2)Patient will utilize breathing exercises 3 times per week  3)Next session scheduled for 3 weeks out after surgery.      MOSHE BUTLER, [facility]       1/19/2022                                                                       ______________________________________________________________________    Treatment Plan    Patient's Name: Danielle Thrasher  YOB: 1970    Date: 10/19/2021      DSM5 Diagnoses: 300.02 (F41.1) Generalized Anxiety Disorder or Adjustment Disorders  309.0 (F43.21) With depressed mood  Psychosocial / Contextual Factors: Pt is a 51 year old female who works remote and lives with her .  WHODAS: 23    Referral / Collaboration:  none needed.    Anticipated number of session or this episode of care: 32-36      MeasurableTreatment Goal(s) related to diagnosis / functional impairment(s)  Goal - Depression: Patient will alleviate depressive symptoms and return to previous level of effective functioning.  I will know I've met my goal when I don't feel in this hole anymore.     Objective #A (Client Action)  Patient will describe situations, thoughts, feelings, and actions associated with depression, their impact on functioning and attempts to resolve them. Patient will do this 4 out of 7 days of the week.     Intervention(s)  Therapist will provide psychoeducation, behavioral activation,  and cognitive restructuring.    Status: Continued- Date: 1/19/2022      Objective #B  Patient will use cognitive strategies identified in therapy to challenge negative thought patterns 90% of the time.    Intervention(s)  Therapist will provide psychoeducation, behavioral activation, and cognitive restructuring.    Status: Continued- Date: 1/19/2022    Objective #C  Patient will identify and use at least three coping skills and distraction and diversion activities to manage feelings of depression. Pt will use these skills at least three times per week.    Intervention(s)  Therapist will provide psychoeducation, behavioral activation, and cognitive restructuring.    Status: Continued- Date: 1/19/2022      Goal- Anxiety: Patient will reduce overall frequency, intensity, and duration of the anxiety so that daily functioning is not impaired.     I will know I've met my goal when I am less anxious.      Objective #A (Client Action)  Patient will describe situations, thoughts, feelings, and actions associated with anxieties and worries, their impact on functioning and attempts to resolve them. Patient will do this 4 out of 7 days of the week.     Intervention(s)  Therapist will provide psychoeducation, behavioral activation, and cognitive restructuring.  Status: Continued- Date: 1/19/2022    Objective #B  Patient will use cognitive strategies identified in therapy to challenge negative thought patterns 90% of the time.    Intervention(s)  Therapist will provide psychoeducation, behavioral activation, and cognitive restructuring.  Status: Continued- Date: 1/19/2022    Objective #C  Patient will identify and use at least three coping skills and distraction and diversion activities to manage feelings of anxiety. Pt will use these skills at least three times per week.    Intervention(s)  Therapist will provide psychoeducation, behavioral activation, and cognitive restructuring.  Status: Continued- Date:  1/19/2022          Patient has reviewed and agreed to the above plan.      MOSHE BUTLER, [facility]  10/26/2021, 1/19/2022

## 2022-01-20 ENCOUNTER — TELEPHONE (OUTPATIENT)
Dept: SURGERY | Facility: CLINIC | Age: 52
End: 2022-01-20
Payer: COMMERCIAL

## 2022-01-20 NOTE — TELEPHONE ENCOUNTER
I called Danielle to let her know of the following results:    Danielle verified her name and date of birth.     Report To:  Danielle Thrasher [id number]  F, 1970  [facility]  NEXT GENERATION SEQUENC (Final result)  Significant Results  TEST REQUESTED:  Hereditary Cancer - Breast Actionable Panel.  Next generation sequencing and copy number variation analysis of genes listed in 'Background' section below  RESULTS: NEGATIVE  Pathogenic/Likely Pathogenic Variant(s): None Detected  Variant(s) of Uncertain Significance: None Detected    Danielle plans to go ahead with surgery as scheduled.     I scheduled Danielle for her post op appt with Dr. Krueger at 9:30am on 3/3/22 at the [facility].     Questions answered. Pt knows how to reach me with any questions.     Evelnye Hernandez 1/20/2022 3:46 PM      Evelyne Hernandez RN BSN  Breast Care Nurse Coordinator  [facility] [place]  [phone]

## 2022-01-27 ENCOUNTER — MYC MEDICAL ADVICE (OUTPATIENT)
Dept: SURGERY | Facility: PHYSICIAN GROUP | Age: 52
End: 2022-01-27
Payer: COMMERCIAL

## 2022-01-31 ENCOUNTER — TELEPHONE (OUTPATIENT)
Dept: ONCOLOGY | Facility: CLINIC | Age: 52
End: 2022-01-31
Payer: COMMERCIAL

## 2022-01-31 NOTE — TELEPHONE ENCOUNTER
1/31 L/daya to have patient call us to [facility] to discuss [facility] review. KAITLYNN

## 2022-02-02 ENCOUNTER — OFFICE VISIT (OUTPATIENT)
Dept: FAMILY MEDICINE | Facility: CLINIC | Age: 52
End: 2022-02-02
Payer: COMMERCIAL

## 2022-02-02 VITALS
SYSTOLIC BLOOD PRESSURE: 115 MMHG | WEIGHT: 167 LBS | BODY MASS INDEX: 29.58 KG/M2 | DIASTOLIC BLOOD PRESSURE: 73 MMHG | HEART RATE: 65 BPM | TEMPERATURE: 98.5 F | OXYGEN SATURATION: 100 %

## 2022-02-02 DIAGNOSIS — C50.511 MALIGNANT NEOPLASM OF LOWER-OUTER QUADRANT OF RIGHT FEMALE BREAST, UNSPECIFIED ESTROGEN RECEPTOR STATUS (H): ICD-10-CM

## 2022-02-02 DIAGNOSIS — Z01.818 PREOP GENERAL PHYSICAL EXAM: Primary | ICD-10-CM

## 2022-02-02 PROCEDURE — 99214 OFFICE O/P EST MOD 30 MIN: CPT | Performed by: FAMILY MEDICINE

## 2022-02-02 NOTE — PROGRESS NOTES
[facility]  [address] [place] [address]  [place] [address]  Phone: [phone]  Primary Provider: Mehnaz Cabrera       PREOPERATIVE EVALUATION:  Today's date: 2/2/2022    Danielle Thrasher is a 51 year old female who presents for a preoperative evaluation.    Surgical Information:  Surgery/Procedure: right breast skin sparing mastectomy with right sentinel lymph node biopsy  Surgery Location: [facility]  Surgeon: Evans TAVARES, David TAVARES  Surgery Date: 2/8/22  Time of Surgery: 11:30 AM  Where patient plans to recover: At home with family  Fax number for surgical facility: Note does not need to be faxed, will be available electronically in Epic.    Type of Anesthesia Anticipated: to be determined    Assessment & Plan     The proposed surgical procedure is considered LOW risk.    (Z01.818) Preop general physical exam  (primary encounter diagnosis)  (C50.511) Malignant neoplasm of lower-outer quadrant of right female breast, unspecified estrogen receptor status (H)  Plan:     Risks and Recommendations:  The patient has the following additional risks and recommendations for perioperative complications:   - No identified additional risk factors other than previously addressed    Medication Instructions:  Patient is to take all scheduled medications on the day of surgery    RECOMMENDATION:  APPROVAL GIVEN to proceed with proposed procedure, without further diagnostic evaluation.    Review of external notes as documented above     Subjective     HPI related to upcoming procedure: Danielle Thrasher is a 51 year old female who presents with abnormal mammogram and breast cancer by biopsy. Symptom onset has been sudden, unchanged.    Preop Questions 2/1/2022   1. Have you ever had a heart attack or stroke? No   2. Have you ever had surgery on your heart or blood vessels, such as a stent placement, a coronary artery bypass, or surgery on an artery in your head, neck, heart, or legs?  No   3. Do you have chest pain with activity? No   4. Do you have a history of  heart failure? No   5. Do you currently have a cold, bronchitis or symptoms of other infection? No   6. Do you have a cough, shortness of breath, or wheezing? No   7. Do you or anyone in your family have previous history of blood clots? No   8. Do you or does anyone in your family have a serious bleeding problem such as prolonged bleeding following surgeries or cuts? No   9. Have you ever had problems with anemia or been told to take iron pills? No   10. Have you had any abnormal blood loss such as black, tarry or bloody stools, or abnormal vaginal bleeding? No   11. Have you ever had a blood transfusion? No   12. Are you willing to have a blood transfusion if it is medically needed before, during, or after your surgery? Yes   13. Have you or any of your relatives ever had problems with anesthesia? No   14. Do you have sleep apnea, excessive snoring or daytime drowsiness? No   15. Do you have any artifical heart valves or other implanted medical devices like a pacemaker, defibrillator, or continuous glucose monitor? No   16. Do you have artificial joints? No   17. Are you allergic to latex? No   18. Is there any chance that you may be pregnant? No       Health Care Directive:  Patient does not have a Health Care Directive or Living Will: Discussed advance care planning with patient; information given to patient to review.    Preoperative Review of :   reviewed - no record of controlled substances prescribed.       Status of Chronic Conditions:  See problem list for active medical problems.  Problems all longstanding and stable, except as noted/documented.  See ROS for pertinent symptoms related to these conditions.      Review of Systems  CONSTITUTIONAL: NEGATIVE for fever, chills, change in weight  INTEGUMENTARY/SKIN: NEGATIVE for worrisome rashes, moles or lesions  EYES: NEGATIVE for vision changes or irritation  ENT/MOUTH:  NEGATIVE for ear, mouth and throat problems  RESP: NEGATIVE for significant cough or SOB  CV: NEGATIVE for chest pain, palpitations or peripheral edema  GI: NEGATIVE for nausea, abdominal pain, heartburn, or change in bowel habits  : NEGATIVE for frequency, dysuria, or hematuria  MUSCULOSKELETAL: NEGATIVE for significant arthralgias or myalgia  NEURO: NEGATIVE for weakness, dizziness or paresthesias  ENDOCRINE: NEGATIVE for temperature intolerance, skin/hair changes  HEME: NEGATIVE for bleeding problems  PSYCHIATRIC: NEGATIVE for changes in mood or affect  Constitutional, neuro, ENT, endocrine, pulmonary, cardiac, gastrointestinal, genitourinary, musculoskeletal, integument and psychiatric systems are negative, except as otherwise noted.    Patient Active Problem List    Diagnosis Date Noted     Malignant neoplasm of lower-outer quadrant of right female breast (H) 02/02/2022     Priority: High     Intermittent asthma 09/23/2014     Priority: Medium     BRIDGET (generalized anxiety disorder) 05/07/2012     Priority: Medium     Dysmenorrhea 02/06/2018     Priority: Low     CARDIOVASCULAR SCREENING; LDL GOAL LESS THAN 160 10/31/2010     Priority: Low     Allergic rhinitis      Priority: Low     Patellofemoral pain syndrome      Priority: Low      Past Medical History:   Diagnosis Date     Allergic rhinitis     mold     Major depressive disorder, recurrent episode (H) 2009     Malignant neoplasm of lower-outer quadrant of right female breast (H) 2/2/2022     Mild persistent asthma      Patellofemoral pain syndrome      Tear meniscus knee 1998    left     Past Surgical History:   Procedure Laterality Date     EXCISE GANGLION WRIST      LT     LASIK       TONSILLECTOMY & ADENOIDECTOMY       Current Outpatient Medications   Medication Sig Dispense Refill     albuterol (PROAIR HFA/PROVENTIL HFA/VENTOLIN HFA) 108 (90 Base) MCG/ACT inhaler Inhale 2 puffs into the lungs every 4 hours as needed for shortness of breath / dyspnea  or wheezing 18 g 4     Ascorbic Acid (VITAMIN C PO) Take 500 mg by mouth       calcium carbonate (OS-JACKSON 500 MG [place]. [place]) 500 MG tablet Take 500 mg by mouth daily       cetirizine (ZYRTEC) 10 MG tablet Take 1 tablet by mouth daily. 90 tablet 3     citalopram (CELEXA) 40 MG tablet Take 1 tablet (40 mg) by mouth daily 90 tablet 3     fluticasone (FLONASE) 50 MCG/ACT nasal spray Spray 1 spray into both nostrils daily 16 g 11     Magnesium 400 MG TABS Take by mouth daily       Multiple Vitamin (MULTI-VITAMIN) per tablet Take 1 tablet by mouth daily. 90 tablet 3     Omega-3 Fatty Acids (OMEGA-3 FISH OIL PO) Take by mouth daily         No Known Allergies     Social History     Tobacco Use     Smoking status: Never Smoker     Smokeless tobacco: Never Used     Tobacco comment: non-smoking household   Substance Use Topics     Alcohol use: Yes     Alcohol/week: 2.5 standard drinks     Comment: glass of wine 2-3 times weekly with 1 liquor drink on the weekend     Family History   Problem Relation Age of Onset     Breast Cancer Mother 56     Cancer Mother         skin     Cancer Father         skin     Cancer Maternal Grandmother         stomach     Cancer Maternal Grandfather         liver     Diabetes Maternal Grandfather      Heart Disease Maternal Grandfather         questionable MI     Colon Cancer Paternal Grandmother      C.A.D. Paternal Grandfather 70        MI     Ovarian Cancer Other         maternal great grandmother     Hypertension No family hx of      Cerebrovascular Disease No family hx of      History   Drug Use No         Objective     /73 (BP Location: Left arm, Patient Position: Sitting, Cuff Size: Adult Large)   Pulse 65   Temp 98.5  F (36.9  C) (Oral)   Wt 75.8 kg (167 lb)   LMP 01/12/2022 (Approximate)   SpO2 100%   Breastfeeding No   BMI 29.58 kg/m      Physical Exam    GENERAL APPEARANCE: healthy, alert and no distress     EYES: EOMI, PERRL     HENT: ear canals and TM's normal and nose and  mouth without ulcers or lesions     NECK: no adenopathy, no asymmetry, masses, or scars and thyroid normal to palpation     RESP: lungs clear to auscultation - no rales, rhonchi or wheezes     CV: regular rates and rhythm, normal S1 S2, no S3 or S4 and no murmur, click or rub     ABDOMEN:  soft, nontender, no HSM or masses and bowel sounds normal     MS: extremities normal- no gross deformities noted, no evidence of inflammation in joints, FROM in all extremities.     SKIN: no suspicious lesions or rashes     NEURO: Normal strength and tone, sensory exam grossly normal, mentation intact and speech normal     PSYCH: mentation appears normal. and affect normal/bright     LYMPHATICS: No cervical adenopathy    No results for input(s): HGB, PLT, INR, NA, POTASSIUM, CR, A1C in the last 25549 hours.     Diagnostics:  No labs were ordered during this visit.   No EKG required, no history of coronary heart disease, significant arrhythmia, peripheral arterial disease or other structural heart disease.    Revised Cardiac Risk Index (RCRI):  The patient has the following serious cardiovascular risks for perioperative complications:   - No serious cardiac risks = 0 points     RCRI Interpretation: 0 points: Class I (very low risk - 0.4% complication rate)           Signed Electronically by: Mehnaz Cabrera MD  Copy of this evaluation report is provided to requesting physician.

## 2022-02-02 NOTE — H&P (VIEW-ONLY)
[facility]  [address] [place] [address]  [place] [address]  Phone: [phone]  Primary Provider: Mehnaz Cabrera       PREOPERATIVE EVALUATION:  Today's date: 2/2/2022    Danielle Thrasher is a 51 year old female who presents for a preoperative evaluation.    Surgical Information:  Surgery/Procedure: right breast skin sparing mastectomy with right sentinel lymph node biopsy  Surgery Location: [facility]  Surgeon: Evans TAVARES, David TAVARES  Surgery Date: 2/8/22  Time of Surgery: 11:30 AM  Where patient plans to recover: At home with family  Fax number for surgical facility: Note does not need to be faxed, will be available electronically in Epic.    Type of Anesthesia Anticipated: to be determined    Assessment & Plan     The proposed surgical procedure is considered LOW risk.    (Z01.818) Preop general physical exam  (primary encounter diagnosis)  (C50.511) Malignant neoplasm of lower-outer quadrant of right female breast, unspecified estrogen receptor status (H)  Plan:     Risks and Recommendations:  The patient has the following additional risks and recommendations for perioperative complications:   - No identified additional risk factors other than previously addressed    Medication Instructions:  Patient is to take all scheduled medications on the day of surgery    RECOMMENDATION:  APPROVAL GIVEN to proceed with proposed procedure, without further diagnostic evaluation.    Review of external notes as documented above     Subjective     HPI related to upcoming procedure: Danielle Thrasher is a 51 year old female who presents with abnormal mammogram and breast cancer by biopsy. Symptom onset has been sudden, unchanged.    Preop Questions 2/1/2022   1. Have you ever had a heart attack or stroke? No   2. Have you ever had surgery on your heart or blood vessels, such as a stent placement, a coronary artery bypass, or surgery on an artery in your head, neck, heart, or legs?  No   3. Do you have chest pain with activity? No   4. Do you have a history of  heart failure? No   5. Do you currently have a cold, bronchitis or symptoms of other infection? No   6. Do you have a cough, shortness of breath, or wheezing? No   7. Do you or anyone in your family have previous history of blood clots? No   8. Do you or does anyone in your family have a serious bleeding problem such as prolonged bleeding following surgeries or cuts? No   9. Have you ever had problems with anemia or been told to take iron pills? No   10. Have you had any abnormal blood loss such as black, tarry or bloody stools, or abnormal vaginal bleeding? No   11. Have you ever had a blood transfusion? No   12. Are you willing to have a blood transfusion if it is medically needed before, during, or after your surgery? Yes   13. Have you or any of your relatives ever had problems with anesthesia? No   14. Do you have sleep apnea, excessive snoring or daytime drowsiness? No   15. Do you have any artifical heart valves or other implanted medical devices like a pacemaker, defibrillator, or continuous glucose monitor? No   16. Do you have artificial joints? No   17. Are you allergic to latex? No   18. Is there any chance that you may be pregnant? No       Health Care Directive:  Patient does not have a Health Care Directive or Living Will: Discussed advance care planning with patient; information given to patient to review.    Preoperative Review of :   reviewed - no record of controlled substances prescribed.       Status of Chronic Conditions:  See problem list for active medical problems.  Problems all longstanding and stable, except as noted/documented.  See ROS for pertinent symptoms related to these conditions.      Review of Systems  CONSTITUTIONAL: NEGATIVE for fever, chills, change in weight  INTEGUMENTARY/SKIN: NEGATIVE for worrisome rashes, moles or lesions  EYES: NEGATIVE for vision changes or irritation  ENT/MOUTH:  NEGATIVE for ear, mouth and throat problems  RESP: NEGATIVE for significant cough or SOB  CV: NEGATIVE for chest pain, palpitations or peripheral edema  GI: NEGATIVE for nausea, abdominal pain, heartburn, or change in bowel habits  : NEGATIVE for frequency, dysuria, or hematuria  MUSCULOSKELETAL: NEGATIVE for significant arthralgias or myalgia  NEURO: NEGATIVE for weakness, dizziness or paresthesias  ENDOCRINE: NEGATIVE for temperature intolerance, skin/hair changes  HEME: NEGATIVE for bleeding problems  PSYCHIATRIC: NEGATIVE for changes in mood or affect  Constitutional, neuro, ENT, endocrine, pulmonary, cardiac, gastrointestinal, genitourinary, musculoskeletal, integument and psychiatric systems are negative, except as otherwise noted.    Patient Active Problem List    Diagnosis Date Noted     Malignant neoplasm of lower-outer quadrant of right female breast (H) 02/02/2022     Priority: High     Intermittent asthma 09/23/2014     Priority: Medium     BRIDGET (generalized anxiety disorder) 05/07/2012     Priority: Medium     Dysmenorrhea 02/06/2018     Priority: Low     CARDIOVASCULAR SCREENING; LDL GOAL LESS THAN 160 10/31/2010     Priority: Low     Allergic rhinitis      Priority: Low     Patellofemoral pain syndrome      Priority: Low      Past Medical History:   Diagnosis Date     Allergic rhinitis     mold     Major depressive disorder, recurrent episode (H) 2009     Malignant neoplasm of lower-outer quadrant of right female breast (H) 2/2/2022     Mild persistent asthma      Patellofemoral pain syndrome      Tear meniscus knee 1998    left     Past Surgical History:   Procedure Laterality Date     EXCISE GANGLION WRIST      LT     LASIK       TONSILLECTOMY & ADENOIDECTOMY       Current Outpatient Medications   Medication Sig Dispense Refill     albuterol (PROAIR HFA/PROVENTIL HFA/VENTOLIN HFA) 108 (90 Base) MCG/ACT inhaler Inhale 2 puffs into the lungs every 4 hours as needed for shortness of breath / dyspnea  or wheezing 18 g 4     Ascorbic Acid (VITAMIN C PO) Take 500 mg by mouth       calcium carbonate (OS-JACKSON 500 MG [place]. [place]) 500 MG tablet Take 500 mg by mouth daily       cetirizine (ZYRTEC) 10 MG tablet Take 1 tablet by mouth daily. 90 tablet 3     citalopram (CELEXA) 40 MG tablet Take 1 tablet (40 mg) by mouth daily 90 tablet 3     fluticasone (FLONASE) 50 MCG/ACT nasal spray Spray 1 spray into both nostrils daily 16 g 11     Magnesium 400 MG TABS Take by mouth daily       Multiple Vitamin (MULTI-VITAMIN) per tablet Take 1 tablet by mouth daily. 90 tablet 3     Omega-3 Fatty Acids (OMEGA-3 FISH OIL PO) Take by mouth daily         No Known Allergies     Social History     Tobacco Use     Smoking status: Never Smoker     Smokeless tobacco: Never Used     Tobacco comment: non-smoking household   Substance Use Topics     Alcohol use: Yes     Alcohol/week: 2.5 standard drinks     Comment: glass of wine 2-3 times weekly with 1 liquor drink on the weekend     Family History   Problem Relation Age of Onset     Breast Cancer Mother 56     Cancer Mother         skin     Cancer Father         skin     Cancer Maternal Grandmother         stomach     Cancer Maternal Grandfather         liver     Diabetes Maternal Grandfather      Heart Disease Maternal Grandfather         questionable MI     Colon Cancer Paternal Grandmother      C.A.D. Paternal Grandfather 70        MI     Ovarian Cancer Other         maternal great grandmother     Hypertension No family hx of      Cerebrovascular Disease No family hx of      History   Drug Use No         Objective     /73 (BP Location: Left arm, Patient Position: Sitting, Cuff Size: Adult Large)   Pulse 65   Temp 98.5  F (36.9  C) (Oral)   Wt 75.8 kg (167 lb)   LMP 01/12/2022 (Approximate)   SpO2 100%   Breastfeeding No   BMI 29.58 kg/m      Physical Exam    GENERAL APPEARANCE: healthy, alert and no distress     EYES: EOMI, PERRL     HENT: ear canals and TM's normal and nose and  mouth without ulcers or lesions     NECK: no adenopathy, no asymmetry, masses, or scars and thyroid normal to palpation     RESP: lungs clear to auscultation - no rales, rhonchi or wheezes     CV: regular rates and rhythm, normal S1 S2, no S3 or S4 and no murmur, click or rub     ABDOMEN:  soft, nontender, no HSM or masses and bowel sounds normal     MS: extremities normal- no gross deformities noted, no evidence of inflammation in joints, FROM in all extremities.     SKIN: no suspicious lesions or rashes     NEURO: Normal strength and tone, sensory exam grossly normal, mentation intact and speech normal     PSYCH: mentation appears normal. and affect normal/bright     LYMPHATICS: No cervical adenopathy    No results for input(s): HGB, PLT, INR, NA, POTASSIUM, CR, A1C in the last 05724 hours.     Diagnostics:  No labs were ordered during this visit.   No EKG required, no history of coronary heart disease, significant arrhythmia, peripheral arterial disease or other structural heart disease.    Revised Cardiac Risk Index (RCRI):  The patient has the following serious cardiovascular risks for perioperative complications:   - No serious cardiac risks = 0 points     RCRI Interpretation: 0 points: Class I (very low risk - 0.4% complication rate)           Signed Electronically by: Mehnaz Cabrera MD  Copy of this evaluation report is provided to requesting physician.

## 2022-02-02 NOTE — PATIENT INSTRUCTIONS
Preparing for Your Surgery  Getting started  A nurse will call you to review your health history and instructions. They will give you an arrival time based on your scheduled surgery time. Please be ready to share:    Your doctor's clinic name and phone number    Your medical, surgical and anesthesia history    A list of allergies and sensitivities    A list of medicines, including herbal treatments and over-the-counter drugs    Whether the patient has a legal guardian (ask how to send us the papers in advance)  Please tell us if you're pregnant--or if there's any chance you might be pregnant. Some surgeries may injure a fetus (unborn baby), so they require a pregnancy test. Surgeries that are safe for a fetus don't always need a test, and you can choose whether to have one.   If you have a child who's having surgery, please ask for a copy of Preparing for Your Child's Surgery.    Preparing for surgery    Within 30 days of surgery: Have a pre-op exam (sometimes called an H&P, or History and Physical). This can be done at a clinic or pre-operative center.  ? If you're having a , you may not need this exam. Talk to your care team.    At your pre-op exam, talk to your care team about all medicines you take. If you need to stop any medicines before surgery, ask when to start taking them again.  ? We do this for your safety. Many medicines can make you bleed too much during surgery. Some change how well surgery (anesthesia) drugs work.    Call your insurance company to let them know you're having surgery. (If you don't have insurance, call [phone].)    Call your clinic if there's any change in your health. This includes signs of a cold or flu (sore throat, runny nose, cough, rash, fever). It also includes a scrape or scratch near the surgery site.    If you have questions on the day of surgery, call your hospital or surgery center.  COVID testing  You may need to be tested for COVID-19 before having  surgery. If so, your surgical team will give you instructions for scheduling this test, separate from your preoperative history and physical.  Eating and drinking guidelines  For your safety: Unless your surgeon tells you otherwise, follow the guidelines below.    Eat and drink as usual until 8 hours before surgery. After that, no food or milk.    Drink clear liquids until 2 hours before surgery. These are liquids you can see through, like water, Gatorade and Propel Water. You may also have black coffee and tea (no cream or milk).    Nothing by mouth within 2 hours of surgery. This includes gum, candy and breath mints.    If you drink alcohol: Stop drinking it the night before surgery.    If your care team tells you to take medicine on the morning of surgery, it's okay to take it with a sip of water.  Preventing infection    Shower or bathe the night before and morning of your surgery. Follow the instructions your clinic gave you. (If no instructions, use regular soap.)    Don't shave or clip hair near your surgery site. We'll remove the hair if needed.    Don't smoke or vape the morning of surgery. You may chew nicotine gum up to 2 hours before surgery. A nicotine patch is okay.  ? Note: Some surgeries require you to completely quit smoking and nicotine. Check with your surgeon.    Your care team will make every effort to keep you safe from infection. We will:  ? Clean our hands often with soap and water (or an alcohol-based hand rub).  ? Clean the skin at your surgery site with a special soap that kills germs.  ? Give you a special gown to keep you warm. (Cold raises the risk of infection.)  ? Wear special hair covers, masks, gowns and gloves during surgery.  ? Give antibiotic medicine, if prescribed. Not all surgeries need antibiotics.  What to bring on the day of surgery    Photo ID and insurance card    Copy of your health care directive, if you have one    Glasses and hearing aides (bring cases)  ? You can't  wear contacts during surgery    Inhaler and eye drops, if you use them (tell us about these when you arrive)    CPAP machine or breathing device, if you use them    A few personal items, if spending the night    If you have . . .  ? A pacemaker, ICD (cardiac defibrillator) or other implant: Bring the ID card.  ? An implanted stimulator: Bring the remote control.  ? A legal guardian: Bring a copy of the certified (court-stamped) guardianship papers.  Please remove any jewelry, including body piercings. Leave jewelry and other valuables at home.  If you're going home the day of surgery    You must have a responsible adult drive you home. They should stay with you overnight as well.    If you don't have someone to stay with you, and you aren't safe to go home alone, we may keep you overnight. Insurance often won't pay for this.  After surgery  If it's hard to control your pain or you need more pain medicine, please call your surgeon's office.  Questions?   If you have any questions for your care team, list them here: _________________________________________________________________________________________________________________________________________________________________________ ____________________________________ ____________________________________ ____________________________________  For informational purposes only. Not to replace the advice of your health care provider. Copyright   2003, 2019 [facility]. All rights reserved. Clinically reviewed by Daria Henson MD. [employer] 929103 - REV 07/21.    Get the shingles vaccine, called Shingrix (given as 2 shots, 2 to 6 months apart), even if you have already had the Zostavax vaccine. Discuss getting the Shingix vaccine from your pharmacist, or schedule an ancillary shot visit here. Some insurances do not cover the cost of these vaccines.      Discuss getting a whooping cough (Tdap) vaccine from your pharmacist, or schedule an ancillary shot visit  here.  Some insurances do not cover the cost of these vaccines.

## 2022-02-03 ENCOUNTER — TELEPHONE (OUTPATIENT)
Dept: MAMMOGRAPHY | Facility: CLINIC | Age: 52
End: 2022-02-03
Payer: COMMERCIAL

## 2022-02-03 NOTE — TELEPHONE ENCOUNTER
Breast Care Nurse Coordination:  Preoperative call placed to Danielle today to assess her needs, and check in on whether she has any questions or concerns regarding her upcoming breast surgery.    Patient was recently diagnosed with Right Breast ILC and is scheduled to have a Bilateral Mastectomy with right sentinel lymph node biopsy with reconstruction by Dr. Yazmin Krueger/Dr. Linden Hernandez on 2/8/2022 at the [facility].    Patient is scheduled for COVID testing 2/4/22, and will see  the morning of her surgery on 2/8/22 for markings.    Danielle states she has had a preop physical exam with her primary provider- Dr. Mehnaz Cabrera today, and has already picked up her  postmastectomy garments.  I informed patient to bring one of the post mastectomy garments with her to the hospital the day of surgery.  We discussed the day of surgery and what to expect the days and weeks following.  I informed patient to wear loose, comfortable fitted clothing.     Informed patient Dr. Krueger will be reaching out to patient 3 days following surgery to inform of pathology results and then we will work on getting her scheduled for Medical Oncology Consultation.    Patient is already scheduled for 1st Post Op visit with Dr. Krueger on 3/3/22 @ 9:30a.m.    I answered all of patient's questions completely and thoroughly to her satisfaction.  I informed patient that I will reach out to her next week to check in with her, or she can always call me back with any questions or concerns.  Patient verbalized understanding and agrees with the plan of care.  Lori Lopez RN, BSN    Lori Lopez RN BSN  Breast Care Nurse Coordinator  [facility] [place] [place]  [phone]

## 2022-02-04 ENCOUNTER — LAB (OUTPATIENT)
Dept: LAB | Facility: CLINIC | Age: 52
End: 2022-02-04
Payer: COMMERCIAL

## 2022-02-04 ENCOUNTER — MYC MEDICAL ADVICE (OUTPATIENT)
Dept: SURGERY | Facility: PHYSICIAN GROUP | Age: 52
End: 2022-02-04
Payer: COMMERCIAL

## 2022-02-04 DIAGNOSIS — Z11.59 ENCOUNTER FOR SCREENING FOR OTHER VIRAL DISEASES: ICD-10-CM

## 2022-02-04 DIAGNOSIS — Z11.59 NEED FOR HEPATITIS C SCREENING TEST: Primary | ICD-10-CM

## 2022-02-04 PROCEDURE — U0005 INFEC AGEN DETEC AMPLI PROBE: HCPCS

## 2022-02-04 PROCEDURE — U0003 INFECTIOUS AGENT DETECTION BY NUCLEIC ACID (DNA OR RNA); SEVERE ACUTE RESPIRATORY SYNDROME CORONAVIRUS 2 (SARS-COV-2) (CORONAVIRUS DISEASE [COVID-19]), AMPLIFIED PROBE TECHNIQUE, MAKING USE OF HIGH THROUGHPUT TECHNOLOGIES AS DESCRIBED BY CMS-2020-01-R: HCPCS

## 2022-02-05 ENCOUNTER — MYC MEDICAL ADVICE (OUTPATIENT)
Dept: SURGERY | Facility: PHYSICIAN GROUP | Age: 52
End: 2022-02-05
Payer: COMMERCIAL

## 2022-02-06 LAB — SARS-COV-2 RNA RESP QL NAA+PROBE: NEGATIVE

## 2022-02-07 RX ORDER — ACETAMINOPHEN 500 MG
500-1000 TABLET ORAL EVERY 6 HOURS PRN
COMMUNITY

## 2022-02-07 NOTE — PROGRESS NOTES
PTA medications updated by Medication Scribe prior to surgery via phone call with patient (last doses completed by Nurse)     Medication history sources: Patient, Surescripts and H&P  In the past week, patient estimated taking medication this percent of the time: Greater than 90%  Adherence assessment: N/A Not Observed    Significant changes made to the medication list:  None      Additional medication history information:   Patient brought own home meds: Flonase Nasal [place]    Medication reconciliation completed by provider prior to medication history? No    Time spent in this activity: 25 minutes    The information provided in this note is only as accurate as the sources available at the time of update(s)      Prior to Admission medications    Medication Sig Last Dose Taking? Auth Provider   acetaminophen (TYLENOL) 500 MG tablet Take 500-1,000 mg by mouth every 6 hours as needed for mild pain 2/5/2022 at prn Yes Reported, Patient   albuterol (PROAIR HFA/PROVENTIL HFA/VENTOLIN HFA) 108 (90 Base) MCG/ACT inhaler Inhale 2 puffs into the lungs every 4 hours as needed for shortness of breath / dyspnea or wheezing  at PRN Yes Mehnaz Cabrera MD   Ascorbic Acid (VITAMIN C PO) Take 500 mg by mouth 2/4/2022 at am Yes Reported, Patient   calcium carbonate (OS-JACKSON 500 MG [place]. [place]) 500 MG tablet Take 500 mg by mouth daily 2/4/2022 at am Yes Reported, Patient   cetirizine (ZYRTEC) 10 MG tablet Take 1 tablet by mouth daily. 2/7/2022 at am Yes Mehnaz Cabrera MD   citalopram (CELEXA) 40 MG tablet Take 1 tablet (40 mg) by mouth daily 2/7/2022 at pm Yes Mehnaz Cabrera MD   fluticasone (FLONASE) 50 MCG/ACT nasal spray Spray 1 spray into both nostrils daily 2/7/2022 at am Yes Mehnaz Cabrera MD   Magnesium 400 MG TABS Take by mouth daily 2/4/2022 at am Yes Reported, Patient   Multiple Vitamin (MULTI-VITAMIN) per tablet Take 1 tablet by mouth daily. 2/7/2022 at am Yes Mehnaz Cabrera  MD   Omega-3 Fatty Acids (OMEGA-3 FISH OIL PO) Take by mouth daily 1/31/2022 at am Yes Reported, Patient     Medication history completed by:    Simon Roberts CPhT  Medication [facility]

## 2022-02-08 ENCOUNTER — APPOINTMENT (OUTPATIENT)
Dept: SURGERY | Facility: PHYSICIAN GROUP | Age: 52
End: 2022-02-08
Payer: COMMERCIAL

## 2022-02-08 ENCOUNTER — ANESTHESIA EVENT (OUTPATIENT)
Dept: SURGERY | Facility: CLINIC | Age: 52
End: 2022-02-08
Payer: COMMERCIAL

## 2022-02-08 ENCOUNTER — ANESTHESIA (OUTPATIENT)
Dept: SURGERY | Facility: CLINIC | Age: 52
End: 2022-02-08
Payer: COMMERCIAL

## 2022-02-08 ENCOUNTER — HOSPITAL ENCOUNTER (OUTPATIENT)
Facility: CLINIC | Age: 52
Discharge: HOME | End: 2022-02-09
Attending: SURGERY | Admitting: PLASTIC SURGERY
Payer: COMMERCIAL

## 2022-02-08 ENCOUNTER — HOSPITAL ENCOUNTER (OUTPATIENT)
Dept: NUCLEAR MEDICINE | Facility: CLINIC | Age: 52
Setting detail: NUCLEAR MEDICINE
End: 2022-02-08
Attending: SURGERY
Payer: COMMERCIAL

## 2022-02-08 DIAGNOSIS — C50.511 MALIGNANT NEOPLASM OF LOWER-OUTER QUADRANT OF RIGHT FEMALE BREAST, UNSPECIFIED ESTROGEN RECEPTOR STATUS (H): Primary | ICD-10-CM

## 2022-02-08 DIAGNOSIS — C50.911 MALIGNANT NEOPLASM OF RIGHT BREAST IN FEMALE, ESTROGEN RECEPTOR POSITIVE, UNSPECIFIED SITE OF BREAST (H): ICD-10-CM

## 2022-02-08 DIAGNOSIS — Z17.0 MALIGNANT NEOPLASM OF RIGHT BREAST IN FEMALE, ESTROGEN RECEPTOR POSITIVE, UNSPECIFIED SITE OF BREAST (H): ICD-10-CM

## 2022-02-08 DIAGNOSIS — Z17.0 MALIGNANT NEOPLASM OF LOWER-OUTER QUADRANT OF RIGHT BREAST OF FEMALE, ESTROGEN RECEPTOR POSITIVE (H): ICD-10-CM

## 2022-02-08 DIAGNOSIS — C50.511 MALIGNANT NEOPLASM OF LOWER-OUTER QUADRANT OF RIGHT BREAST OF FEMALE, ESTROGEN RECEPTOR POSITIVE (H): ICD-10-CM

## 2022-02-08 LAB — HCG UR QL: NEGATIVE

## 2022-02-08 PROCEDURE — 38792 RA TRACER ID OF SENTINL NODE: CPT

## 2022-02-08 PROCEDURE — 38525 BIOPSY/REMOVAL LYMPH NODES: CPT | Mod: RT | Performed by: SURGERY

## 2022-02-08 PROCEDURE — 250N000009 HC RX 250: Performed by: NURSE ANESTHETIST, CERTIFIED REGISTERED

## 2022-02-08 PROCEDURE — 258N000003 HC RX IP 258 OP 636: Performed by: ANESTHESIOLOGY

## 2022-02-08 PROCEDURE — 250N000013 HC RX MED GY IP 250 OP 250 PS 637: Performed by: PLASTIC SURGERY

## 2022-02-08 PROCEDURE — 250N000011 HC RX IP 250 OP 636: Performed by: ANESTHESIOLOGY

## 2022-02-08 PROCEDURE — 250N000011 HC RX IP 250 OP 636: Performed by: PLASTIC SURGERY

## 2022-02-08 PROCEDURE — 272N000001 HC OR GENERAL SUPPLY STERILE: Performed by: SURGERY

## 2022-02-08 PROCEDURE — A9520 TC99 TILMANOCEPT DIAG 0.5MCI: HCPCS | Performed by: SURGERY

## 2022-02-08 PROCEDURE — 710N000009 HC RECOVERY PHASE 1, LEVEL 1, PER MIN: Performed by: SURGERY

## 2022-02-08 PROCEDURE — 81025 URINE PREGNANCY TEST: CPT | Performed by: ANESTHESIOLOGY

## 2022-02-08 PROCEDURE — 88305 TISSUE EXAM BY PATHOLOGIST: CPT | Mod: TC | Performed by: SURGERY

## 2022-02-08 PROCEDURE — L8699 PROSTHETIC IMPLANT NOS: HCPCS | Performed by: SURGERY

## 2022-02-08 PROCEDURE — 88331 PATH CONSLTJ SURG 1 BLK 1SPC: CPT | Mod: TC | Performed by: SURGERY

## 2022-02-08 PROCEDURE — 250N000011 HC RX IP 250 OP 636: Performed by: NURSE ANESTHETIST, CERTIFIED REGISTERED

## 2022-02-08 PROCEDURE — 370N000017 HC ANESTHESIA TECHNICAL FEE, PER MIN: Performed by: SURGERY

## 2022-02-08 PROCEDURE — 19303 MAST SIMPLE COMPLETE: CPT | Mod: RT | Performed by: SURGERY

## 2022-02-08 PROCEDURE — 272N000002 HC OR SUPPLY OTHER OPNP: Performed by: SURGERY

## 2022-02-08 PROCEDURE — 360N000076 HC SURGERY LEVEL 3, PER MIN: Performed by: SURGERY

## 2022-02-08 PROCEDURE — 250N000025 HC SEVOFLURANE, PER MIN: Performed by: SURGERY

## 2022-02-08 PROCEDURE — 343N000001 HC RX 343: Performed by: SURGERY

## 2022-02-08 PROCEDURE — 19303 MAST SIMPLE COMPLETE: CPT | Mod: RT | Performed by: PHYSICIAN ASSISTANT

## 2022-02-08 PROCEDURE — 250N000013 HC RX MED GY IP 250 OP 250 PS 637: Performed by: PHYSICIAN ASSISTANT

## 2022-02-08 PROCEDURE — 258N000003 HC RX IP 258 OP 636: Performed by: NURSE ANESTHETIST, CERTIFIED REGISTERED

## 2022-02-08 PROCEDURE — 250N000009 HC RX 250: Performed by: SURGERY

## 2022-02-08 PROCEDURE — 999N000141 HC STATISTIC PRE-PROCEDURE NURSING ASSESSMENT: Performed by: SURGERY

## 2022-02-08 PROCEDURE — 258N000003 HC RX IP 258 OP 636: Performed by: PLASTIC SURGERY

## 2022-02-08 DEVICE — GRAFT ALLODERM 16X20CM  1518320P CHARGE PER SQ CM= 320 UNITS: Type: IMPLANTABLE DEVICE | Site: BREAST | Status: FUNCTIONAL

## 2022-02-08 DEVICE — IMPLANTABLE DEVICE: Type: IMPLANTABLE DEVICE | Site: BREAST | Status: FUNCTIONAL

## 2022-02-08 RX ORDER — LIDOCAINE 40 MG/G
CREAM TOPICAL
Status: DISCONTINUED | OUTPATIENT
Start: 2022-02-08 | End: 2022-02-08 | Stop reason: HOSPADM

## 2022-02-08 RX ORDER — CEFAZOLIN SODIUM/WATER 2 G/20 ML
2 SYRINGE (ML) INTRAVENOUS
Status: COMPLETED | OUTPATIENT
Start: 2022-02-08 | End: 2022-02-08

## 2022-02-08 RX ORDER — MEPERIDINE HYDROCHLORIDE 25 MG/ML
12.5 INJECTION INTRAMUSCULAR; INTRAVENOUS; SUBCUTANEOUS
Status: DISCONTINUED | OUTPATIENT
Start: 2022-02-08 | End: 2022-02-08 | Stop reason: HOSPADM

## 2022-02-08 RX ORDER — CEFAZOLIN SODIUM/WATER 2 G/20 ML
2 SYRINGE (ML) INTRAVENOUS SEE ADMIN INSTRUCTIONS
Status: DISCONTINUED | OUTPATIENT
Start: 2022-02-08 | End: 2022-02-08 | Stop reason: HOSPADM

## 2022-02-08 RX ORDER — SODIUM CHLORIDE, SODIUM LACTATE, POTASSIUM CHLORIDE, CALCIUM CHLORIDE 600; 310; 30; 20 MG/100ML; MG/100ML; MG/100ML; MG/100ML
INJECTION, SOLUTION INTRAVENOUS CONTINUOUS
Status: DISCONTINUED | OUTPATIENT
Start: 2022-02-08 | End: 2022-02-08 | Stop reason: HOSPADM

## 2022-02-08 RX ORDER — ONDANSETRON 2 MG/ML
INJECTION INTRAMUSCULAR; INTRAVENOUS PRN
Status: DISCONTINUED | OUTPATIENT
Start: 2022-02-08 | End: 2022-02-08

## 2022-02-08 RX ORDER — ONDANSETRON 4 MG/1
4 TABLET, ORALLY DISINTEGRATING ORAL EVERY 6 HOURS PRN
Status: DISCONTINUED | OUTPATIENT
Start: 2022-02-08 | End: 2022-02-09 | Stop reason: HOSPADM

## 2022-02-08 RX ORDER — CELECOXIB 200 MG/1
400 CAPSULE ORAL ONCE
Status: COMPLETED | OUTPATIENT
Start: 2022-02-08 | End: 2022-02-08

## 2022-02-08 RX ORDER — FLUTICASONE PROPIONATE 50 MCG
1 SPRAY, SUSPENSION (ML) NASAL DAILY
Status: DISCONTINUED | OUTPATIENT
Start: 2022-02-09 | End: 2022-02-09 | Stop reason: HOSPADM

## 2022-02-08 RX ORDER — OXYCODONE HCL 10 MG/1
10 TABLET, FILM COATED, EXTENDED RELEASE ORAL ONCE
Status: COMPLETED | OUTPATIENT
Start: 2022-02-08 | End: 2022-02-08

## 2022-02-08 RX ORDER — AMOXICILLIN 250 MG
1 CAPSULE ORAL 2 TIMES DAILY
Status: DISCONTINUED | OUTPATIENT
Start: 2022-02-08 | End: 2022-02-09 | Stop reason: HOSPADM

## 2022-02-08 RX ORDER — GABAPENTIN 300 MG/1
300 CAPSULE ORAL
Status: COMPLETED | OUTPATIENT
Start: 2022-02-08 | End: 2022-02-08

## 2022-02-08 RX ORDER — ACETAMINOPHEN 325 MG/1
975 TABLET ORAL EVERY 8 HOURS
Status: DISCONTINUED | OUTPATIENT
Start: 2022-02-08 | End: 2022-02-09 | Stop reason: HOSPADM

## 2022-02-08 RX ORDER — HYDROMORPHONE HCL IN WATER/PF 6 MG/30 ML
0.4 PATIENT CONTROLLED ANALGESIA SYRINGE INTRAVENOUS
Status: DISCONTINUED | OUTPATIENT
Start: 2022-02-08 | End: 2022-02-09 | Stop reason: HOSPADM

## 2022-02-08 RX ORDER — NEOSTIGMINE METHYLSULFATE 1 MG/ML
VIAL (ML) INJECTION PRN
Status: DISCONTINUED | OUTPATIENT
Start: 2022-02-08 | End: 2022-02-08

## 2022-02-08 RX ORDER — LIDOCAINE HYDROCHLORIDE 20 MG/ML
INJECTION, SOLUTION INFILTRATION; PERINEURAL PRN
Status: DISCONTINUED | OUTPATIENT
Start: 2022-02-08 | End: 2022-02-08

## 2022-02-08 RX ORDER — FENTANYL CITRATE 0.05 MG/ML
25 INJECTION, SOLUTION INTRAMUSCULAR; INTRAVENOUS
Status: DISCONTINUED | OUTPATIENT
Start: 2022-02-08 | End: 2022-02-08 | Stop reason: HOSPADM

## 2022-02-08 RX ORDER — NALOXONE HYDROCHLORIDE 0.4 MG/ML
0.4 INJECTION, SOLUTION INTRAMUSCULAR; INTRAVENOUS; SUBCUTANEOUS
Status: DISCONTINUED | OUTPATIENT
Start: 2022-02-08 | End: 2022-02-09 | Stop reason: HOSPADM

## 2022-02-08 RX ORDER — BUPIVACAINE HYDROCHLORIDE AND EPINEPHRINE 2.5; 5 MG/ML; UG/ML
INJECTION, SOLUTION INFILTRATION; PERINEURAL PRN
Status: DISCONTINUED | OUTPATIENT
Start: 2022-02-08 | End: 2022-02-08 | Stop reason: HOSPADM

## 2022-02-08 RX ORDER — PROPOFOL 10 MG/ML
INJECTION, EMULSION INTRAVENOUS CONTINUOUS PRN
Status: DISCONTINUED | OUTPATIENT
Start: 2022-02-08 | End: 2022-02-08

## 2022-02-08 RX ORDER — ACETAMINOPHEN 325 MG/1
650 TABLET ORAL EVERY 4 HOURS PRN
Status: DISCONTINUED | OUTPATIENT
Start: 2022-02-11 | End: 2022-02-09 | Stop reason: HOSPADM

## 2022-02-08 RX ORDER — HYDRALAZINE HYDROCHLORIDE 20 MG/ML
2.5-5 INJECTION INTRAMUSCULAR; INTRAVENOUS EVERY 10 MIN PRN
Status: DISCONTINUED | OUTPATIENT
Start: 2022-02-08 | End: 2022-02-08 | Stop reason: HOSPADM

## 2022-02-08 RX ORDER — HYDROXYZINE HYDROCHLORIDE 25 MG/1
25 TABLET, FILM COATED ORAL EVERY 6 HOURS PRN
Status: DISCONTINUED | OUTPATIENT
Start: 2022-02-08 | End: 2022-02-09 | Stop reason: HOSPADM

## 2022-02-08 RX ORDER — HYDROMORPHONE HCL IN WATER/PF 6 MG/30 ML
0.4 PATIENT CONTROLLED ANALGESIA SYRINGE INTRAVENOUS EVERY 5 MIN PRN
Status: DISCONTINUED | OUTPATIENT
Start: 2022-02-08 | End: 2022-02-08 | Stop reason: HOSPADM

## 2022-02-08 RX ORDER — INDOCYANINE GREEN AND WATER 25 MG
KIT INJECTION PRN
Status: DISCONTINUED | OUTPATIENT
Start: 2022-02-08 | End: 2022-02-08

## 2022-02-08 RX ORDER — DEXAMETHASONE SODIUM PHOSPHATE 4 MG/ML
INJECTION, SOLUTION INTRA-ARTICULAR; INTRALESIONAL; INTRAMUSCULAR; INTRAVENOUS; SOFT TISSUE PRN
Status: DISCONTINUED | OUTPATIENT
Start: 2022-02-08 | End: 2022-02-08

## 2022-02-08 RX ORDER — LABETALOL HYDROCHLORIDE 5 MG/ML
10 INJECTION, SOLUTION INTRAVENOUS
Status: DISCONTINUED | OUTPATIENT
Start: 2022-02-08 | End: 2022-02-08 | Stop reason: HOSPADM

## 2022-02-08 RX ORDER — OXYCODONE HYDROCHLORIDE 5 MG/1
5 TABLET ORAL EVERY 4 HOURS PRN
Status: DISCONTINUED | OUTPATIENT
Start: 2022-02-08 | End: 2022-02-08 | Stop reason: HOSPADM

## 2022-02-08 RX ORDER — DEXTROSE MONOHYDRATE, SODIUM CHLORIDE, AND POTASSIUM CHLORIDE 50; 1.49; 4.5 G/1000ML; G/1000ML; G/1000ML
INJECTION, SOLUTION INTRAVENOUS CONTINUOUS
Status: DISCONTINUED | OUTPATIENT
Start: 2022-02-08 | End: 2022-02-09 | Stop reason: HOSPADM

## 2022-02-08 RX ORDER — ONDANSETRON 2 MG/ML
4 INJECTION INTRAMUSCULAR; INTRAVENOUS EVERY 30 MIN PRN
Status: DISCONTINUED | OUTPATIENT
Start: 2022-02-08 | End: 2022-02-08 | Stop reason: HOSPADM

## 2022-02-08 RX ORDER — CEPHALEXIN 500 MG/1
500 CAPSULE ORAL EVERY 6 HOURS SCHEDULED
Status: DISCONTINUED | OUTPATIENT
Start: 2022-02-09 | End: 2022-02-09 | Stop reason: HOSPADM

## 2022-02-08 RX ORDER — CEFAZOLIN SODIUM 1 G/3ML
1 INJECTION, POWDER, FOR SOLUTION INTRAMUSCULAR; INTRAVENOUS EVERY 8 HOURS
Status: COMPLETED | OUTPATIENT
Start: 2022-02-08 | End: 2022-02-09

## 2022-02-08 RX ORDER — POLYETHYLENE GLYCOL 3350 17 G/17G
17 POWDER, FOR SOLUTION ORAL DAILY
Status: DISCONTINUED | OUTPATIENT
Start: 2022-02-09 | End: 2022-02-09 | Stop reason: HOSPADM

## 2022-02-08 RX ORDER — OXYCODONE HYDROCHLORIDE 5 MG/1
5 TABLET ORAL EVERY 4 HOURS PRN
Status: DISCONTINUED | OUTPATIENT
Start: 2022-02-08 | End: 2022-02-09 | Stop reason: HOSPADM

## 2022-02-08 RX ORDER — PROCHLORPERAZINE MALEATE 10 MG
10 TABLET ORAL EVERY 6 HOURS PRN
Status: DISCONTINUED | OUTPATIENT
Start: 2022-02-08 | End: 2022-02-09 | Stop reason: HOSPADM

## 2022-02-08 RX ORDER — FENTANYL CITRATE 50 UG/ML
INJECTION, SOLUTION INTRAMUSCULAR; INTRAVENOUS PRN
Status: DISCONTINUED | OUTPATIENT
Start: 2022-02-08 | End: 2022-02-08

## 2022-02-08 RX ORDER — NALOXONE HYDROCHLORIDE 0.4 MG/ML
0.2 INJECTION, SOLUTION INTRAMUSCULAR; INTRAVENOUS; SUBCUTANEOUS
Status: DISCONTINUED | OUTPATIENT
Start: 2022-02-08 | End: 2022-02-09 | Stop reason: HOSPADM

## 2022-02-08 RX ORDER — PROPOFOL 10 MG/ML
INJECTION, EMULSION INTRAVENOUS PRN
Status: DISCONTINUED | OUTPATIENT
Start: 2022-02-08 | End: 2022-02-08

## 2022-02-08 RX ORDER — ALBUTEROL SULFATE 90 UG/1
2 AEROSOL, METERED RESPIRATORY (INHALATION) EVERY 4 HOURS PRN
Status: DISCONTINUED | OUTPATIENT
Start: 2022-02-08 | End: 2022-02-09 | Stop reason: HOSPADM

## 2022-02-08 RX ORDER — CEFAZOLIN SODIUM/WATER 2 G/20 ML
2 SYRINGE (ML) INTRAVENOUS
Status: DISCONTINUED | OUTPATIENT
Start: 2022-02-08 | End: 2022-02-08 | Stop reason: HOSPADM

## 2022-02-08 RX ORDER — LIDOCAINE 40 MG/G
CREAM TOPICAL
Status: DISCONTINUED | OUTPATIENT
Start: 2022-02-08 | End: 2022-02-09 | Stop reason: HOSPADM

## 2022-02-08 RX ORDER — FENTANYL CITRATE 50 UG/ML
50 INJECTION, SOLUTION INTRAMUSCULAR; INTRAVENOUS EVERY 5 MIN PRN
Status: DISCONTINUED | OUTPATIENT
Start: 2022-02-08 | End: 2022-02-08 | Stop reason: HOSPADM

## 2022-02-08 RX ORDER — EPHEDRINE SULFATE 50 MG/ML
INJECTION, SOLUTION INTRAMUSCULAR; INTRAVENOUS; SUBCUTANEOUS PRN
Status: DISCONTINUED | OUTPATIENT
Start: 2022-02-08 | End: 2022-02-08

## 2022-02-08 RX ORDER — OXYCODONE HYDROCHLORIDE 5 MG/1
10 TABLET ORAL EVERY 4 HOURS PRN
Status: DISCONTINUED | OUTPATIENT
Start: 2022-02-08 | End: 2022-02-09 | Stop reason: HOSPADM

## 2022-02-08 RX ORDER — ONDANSETRON 4 MG/1
4 TABLET, ORALLY DISINTEGRATING ORAL EVERY 30 MIN PRN
Status: DISCONTINUED | OUTPATIENT
Start: 2022-02-08 | End: 2022-02-08 | Stop reason: HOSPADM

## 2022-02-08 RX ORDER — BISACODYL 10 MG
10 SUPPOSITORY, RECTAL RECTAL DAILY PRN
Status: DISCONTINUED | OUTPATIENT
Start: 2022-02-08 | End: 2022-02-09 | Stop reason: HOSPADM

## 2022-02-08 RX ORDER — HYDROMORPHONE HCL IN WATER/PF 6 MG/30 ML
0.2 PATIENT CONTROLLED ANALGESIA SYRINGE INTRAVENOUS
Status: DISCONTINUED | OUTPATIENT
Start: 2022-02-08 | End: 2022-02-09 | Stop reason: HOSPADM

## 2022-02-08 RX ORDER — GLYCOPYRROLATE 0.2 MG/ML
INJECTION, SOLUTION INTRAMUSCULAR; INTRAVENOUS PRN
Status: DISCONTINUED | OUTPATIENT
Start: 2022-02-08 | End: 2022-02-08

## 2022-02-08 RX ORDER — MAGNESIUM HYDROXIDE 1200 MG/15ML
LIQUID ORAL PRN
Status: DISCONTINUED | OUTPATIENT
Start: 2022-02-08 | End: 2022-02-08 | Stop reason: HOSPADM

## 2022-02-08 RX ORDER — ONDANSETRON 2 MG/ML
4 INJECTION INTRAMUSCULAR; INTRAVENOUS EVERY 6 HOURS PRN
Status: DISCONTINUED | OUTPATIENT
Start: 2022-02-08 | End: 2022-02-09 | Stop reason: HOSPADM

## 2022-02-08 RX ORDER — METHOCARBAMOL 750 MG/1
750 TABLET, FILM COATED ORAL 4 TIMES DAILY
Status: DISCONTINUED | OUTPATIENT
Start: 2022-02-08 | End: 2022-02-09 | Stop reason: HOSPADM

## 2022-02-08 RX ORDER — CITALOPRAM HYDROBROMIDE 40 MG/1
40 TABLET ORAL EVERY EVENING
Status: DISCONTINUED | OUTPATIENT
Start: 2022-02-08 | End: 2022-02-09 | Stop reason: HOSPADM

## 2022-02-08 RX ADMIN — SENNOSIDES AND DOCUSATE SODIUM 1 TABLET: 50; 8.6 TABLET ORAL at 20:32

## 2022-02-08 RX ADMIN — DEXAMETHASONE SODIUM PHOSPHATE 2 MG: 4 INJECTION, SOLUTION INTRA-ARTICULAR; INTRALESIONAL; INTRAMUSCULAR; INTRAVENOUS; SOFT TISSUE at 13:55

## 2022-02-08 RX ADMIN — FENTANYL CITRATE 50 MCG: 50 INJECTION, SOLUTION INTRAMUSCULAR; INTRAVENOUS at 14:03

## 2022-02-08 RX ADMIN — FENTANYL CITRATE 25 MCG: 50 INJECTION, SOLUTION INTRAMUSCULAR; INTRAVENOUS at 13:47

## 2022-02-08 RX ADMIN — GABAPENTIN 300 MG: 300 CAPSULE ORAL at 11:48

## 2022-02-08 RX ADMIN — SODIUM CHLORIDE, POTASSIUM CHLORIDE, SODIUM LACTATE AND CALCIUM CHLORIDE: 600; 310; 30; 20 INJECTION, SOLUTION INTRAVENOUS at 14:39

## 2022-02-08 RX ADMIN — NEOSTIGMINE METHYLSULFATE 3.5 MG: 1 INJECTION, SOLUTION INTRAVENOUS at 16:11

## 2022-02-08 RX ADMIN — ROCURONIUM BROMIDE 50 MG: 50 INJECTION, SOLUTION INTRAVENOUS at 13:48

## 2022-02-08 RX ADMIN — MIDAZOLAM 2 MG: 1 INJECTION INTRAMUSCULAR; INTRAVENOUS at 13:45

## 2022-02-08 RX ADMIN — METHOCARBAMOL 750 MG: 750 TABLET ORAL at 21:46

## 2022-02-08 RX ADMIN — FENTANYL CITRATE 25 MCG: 50 INJECTION, SOLUTION INTRAMUSCULAR; INTRAVENOUS at 13:58

## 2022-02-08 RX ADMIN — Medication 5 MG: at 14:19

## 2022-02-08 RX ADMIN — ROCURONIUM BROMIDE 10 MG: 50 INJECTION, SOLUTION INTRAVENOUS at 14:45

## 2022-02-08 RX ADMIN — ONDANSETRON 4 MG: 2 INJECTION INTRAMUSCULAR; INTRAVENOUS at 16:11

## 2022-02-08 RX ADMIN — PHENYLEPHRINE HYDROCHLORIDE 50 MCG: 10 INJECTION INTRAVENOUS at 15:01

## 2022-02-08 RX ADMIN — POTASSIUM CHLORIDE, DEXTROSE MONOHYDRATE AND SODIUM CHLORIDE: 150; 5; 450 INJECTION, SOLUTION INTRAVENOUS at 18:45

## 2022-02-08 RX ADMIN — GLYCOPYRROLATE 0.5 MG: 0.2 INJECTION, SOLUTION INTRAMUSCULAR; INTRAVENOUS at 16:11

## 2022-02-08 RX ADMIN — Medication 12 MCG: at 14:27

## 2022-02-08 RX ADMIN — TILMANOCEPT 0.56 MILLICURIE: KIT at 12:15

## 2022-02-08 RX ADMIN — FENTANYL CITRATE 50 MCG: 50 INJECTION, SOLUTION INTRAMUSCULAR; INTRAVENOUS at 17:07

## 2022-02-08 RX ADMIN — METHOCARBAMOL 750 MG: 750 TABLET ORAL at 18:45

## 2022-02-08 RX ADMIN — CITALOPRAM HYDROBROMIDE 40 MG: 40 TABLET ORAL at 20:31

## 2022-02-08 RX ADMIN — CEFAZOLIN 1 G: 1 INJECTION, POWDER, FOR SOLUTION INTRAMUSCULAR; INTRAVENOUS at 21:46

## 2022-02-08 RX ADMIN — INDOCYANINE GREEN AND WATER 7.5 MG: KIT at 15:39

## 2022-02-08 RX ADMIN — ROCURONIUM BROMIDE 10 MG: 50 INJECTION, SOLUTION INTRAVENOUS at 14:27

## 2022-02-08 RX ADMIN — Medication 2 G: at 13:45

## 2022-02-08 RX ADMIN — ACETAMINOPHEN 975 MG: 325 TABLET, FILM COATED ORAL at 18:45

## 2022-02-08 RX ADMIN — SODIUM CHLORIDE, POTASSIUM CHLORIDE, SODIUM LACTATE AND CALCIUM CHLORIDE: 600; 310; 30; 20 INJECTION, SOLUTION INTRAVENOUS at 12:01

## 2022-02-08 RX ADMIN — Medication 5 MG: at 14:34

## 2022-02-08 RX ADMIN — CELECOXIB 400 MG: 200 CAPSULE ORAL at 11:48

## 2022-02-08 RX ADMIN — Medication 5 MG: at 15:46

## 2022-02-08 RX ADMIN — OXYCODONE HYDROCHLORIDE 10 MG: 10 TABLET, FILM COATED, EXTENDED RELEASE ORAL at 11:49

## 2022-02-08 RX ADMIN — PROPOFOL 25 MCG/KG/MIN: 10 INJECTION, EMULSION INTRAVENOUS at 13:48

## 2022-02-08 RX ADMIN — PROPOFOL 180 MG: 10 INJECTION, EMULSION INTRAVENOUS at 13:48

## 2022-02-08 RX ADMIN — LIDOCAINE HYDROCHLORIDE 100 MG: 20 INJECTION, SOLUTION INFILTRATION; PERINEURAL at 13:48

## 2022-02-08 RX ADMIN — PHENYLEPHRINE HYDROCHLORIDE 50 MCG: 10 INJECTION INTRAVENOUS at 14:43

## 2022-02-08 ASSESSMENT — LIFESTYLE VARIABLES: TOBACCO_USE: 0

## 2022-02-08 ASSESSMENT — MIFFLIN-ST. JEOR: SCORE: 1324.85

## 2022-02-08 NOTE — OP NOTE
Date of Service: 02/08/22    PREOPERATIVE DIAGNOSES:   1. Right Breast cancer  2. Acquired absence of right breast    POSTOPERATIVE DIAGNOSES:   same    PROCEDURES:     1. Right first stage breast reconstruction with tissue expander placed in the prepectoral plane  2.  Acellular dermal matrix (Alloderm) placement to right breast, 320 cm2  3.  Laser angiography of mastectomy skin flaps      SURGEON: Linden Hernandez MD     ANESTHESIA: General endotracheal anesthesia    COMPLICATIONS: None.     ESTIMATED BLOOD LOSS: 5 cc    DISPOSITION: To the Post Anesthesia Care Unit in stable condition.    TUBES AND DRAINS: elizabeth x 1    COUNTS: Correct     SPECIMENS: None    IMPLANTS:      On the right : Allergan [id number], 500cc, filled to 250 cc      INDICATIONS:    This is a 51 year old  yo female who has breast cancer and has elected for a right mastectomy.  She has elected for an implant-based reconstruction using a  tissue expander and possibly acellular dermal matrix at the first stage.  She has requested prepectoral placement if possible.    We discussed details, risks, benefits, and alternatives of the procedure. She understands limitations and risks including bleeding, hematoma, or seroma that could require surgical evacuation. We discussed tissue expander complications including rupture or deflation, infection or extrusion, rippling or wrinkling, capsular contracture, or any known, suspected, or yet to be determined links between systemic disease and implants. She understands there could be a need for unplanned surgical revision. We discussed there is no guarantee of cure or protection from benign or malignant breast or skin disease. She voiced understanding, signed consent, elected to proceed.    DESCRIPTION OF PROCEDURE:    She was marked preoperatively in the upright position with a skin sparing transverse ellipse pattern. She was taken to the operating room.  I came in the room at the completion of the  mastectomies. The right breast specimen weighed 454g.  A timeout was performed to ensure the correct orientation and procedure.    First, laser angiography was performed.  The anesthesia staff injected 3 cc of the indocyanine green followed by a 10 cc bolus of normal saline.  Using the Spy Elite system, the skin flaps were evaluated.   Skin flap perfusion indices appeared viable.  Based on these findings, I decided to proceed with prepectoral reconstruction.    I began on the right side.  I inspected the pocket and ensured hemostasis.  I irrigated with antibiotic solution.  I injected the chest wall with 1/4% marcaine with epinephrine.  I evaluated the inframammary fold and reestablished it with interrupted 2-0 Vicryl sutures.  I brought a tissue expander of appropriate base width onto the back table.  It was filled with air and rinsed in a 50/50 mixture of antibiotic solution and Betadine.  I brought a piece of 16 x 20 cm acellular dermal matrix onto the field.  It was rinsed in a 50/50 mixture of  antibiotic solution and Betadine.  The acellular dermal matrix was placed into the pocket with the smooth side down and it was secured superiorly and inferiorly with 2-0 vicryl sutures.  The tissue expander was placed into the prepectoral pocket under the acellular dermal matrix and it was secured using the foot plates and 2-0 silk sutures.  The acellular dermal matrix was used to support the lateral aspect of the device by securing it with 2-0 silk sutures.  A dogear of the excess acellular dermal matrix was removed superiorly.  The resulting defect in the mesh was closed with a running 2-0 vicryl suture.   A 15-[place] round channel drain was placed laterally and secured with a 2-0 silk.  The skin was closed without tension using deep dermal 3-0 Monocryl and running 4-0 Monocryl subcuticular stitching.  The air was evacuated from the device.  The tissue expander was accessed using the magnet and the 18 gauge butterfly  to evacuate the air.  It was then filled with 250 cc of sterile injectable saline.    Mastisol and Steri-Strips were applied to the incision.  A Biopatch was placed over the drain site. A large Tegaderm was placed over the skin flaps to immobilize them.   Sterile Kerlix gauze was placed over the breasts. She was wrapped comfortably with a double 6-inch Ace bandage, awoken from anesthesia, and taken to the PACU in stable condition.

## 2022-02-08 NOTE — ANESTHESIA PREPROCEDURE EVALUATION
Anesthesia Pre-Procedure Evaluation    Patient: Danielle Thrasher   MRN: [id number] : 1970        Preoperative Diagnosis: Malignant neoplasm of lower-outer quadrant of right breast of female, estrogen receptor positive (H) [C50.511, Z17.0]    Procedure : Procedure(s):  right breast skin sparing mastectomy with right sentinel lymph node biopsy  RIGHT FIRST STAGE BREAST RECONSTRUCTION WITH TISSUE EXPANDER, POSSIBLE ACELLULAR DERMAL MATRIX          Past Medical History:   Diagnosis Date     Allergic rhinitis     mold     Major depressive disorder, recurrent episode (H) 2009     Malignant neoplasm of lower-outer quadrant of right female breast (H) 2022     Mild persistent asthma      Patellofemoral pain syndrome      Tear meniscus knee 1998    left      Past Surgical History:   Procedure Laterality Date     EXCISE GANGLION WRIST      LT     LASIK       TONSILLECTOMY & ADENOIDECTOMY        No Known Allergies   Social History     Tobacco Use     Smoking status: Never Smoker     Smokeless tobacco: Never Used     Tobacco comment: non-smoking household   Substance Use Topics     Alcohol use: Yes     Alcohol/week: 2.5 standard drinks     Comment: glass of wine 2-3 times weekly with 1 liquor drink on the weekend      Wt Readings from Last 1 Encounters:   22 74.1 kg (163 lb 4.8 oz)        Anesthesia Evaluation   Pt has had prior anesthetic.     No history of anesthetic complications       ROS/MED HX  ENT/Pulmonary:     (+) allergic rhinitis, Mild Persistent, asthma Treatment: Inhaler prn,   (-) tobacco use and sleep apnea   Neurologic:  - neg neurologic ROS     Cardiovascular:  - neg cardiovascular ROS  (-) hypertension   METS/Exercise Tolerance:     Hematologic:       Musculoskeletal:       GI/Hepatic:  - neg GI/hepatic ROS  (-) GERD   Renal/Genitourinary:  - neg Renal ROS     Endo:  - neg endo ROS     Psychiatric/Substance Use:     (+) psychiatric history depression     Infectious Disease:       Malignancy:        Other:            Physical Exam    Airway        Mallampati: II   TM distance: > 3 FB   Neck ROM: full   Mouth opening: > 3 cm    Respiratory Devices and Support         Dental  no notable dental history         Cardiovascular   cardiovascular exam normal          Pulmonary   pulmonary exam normal                OUTSIDE LABS:  CBC:   Lab Results   Component Value Date    WBC 8.5 10/05/2013    HGB 14.8 10/05/2013    HCT 42.9 10/05/2013     10/05/2013     BMP:   Lab Results   Component Value Date     10/05/2013    POTASSIUM 3.7 10/05/2013    CHLORIDE 102 10/05/2013    CO2 26 10/05/2013    BUN 22 10/05/2013    CR 0.98 10/05/2013    GLC 96 10/05/2013     COAGS: No results found for: PTT, INR, FIBR  POC:   Lab Results   Component Value Date    HCG Negative 10/05/2013     HEPATIC:   Lab Results   Component Value Date    ALBUMIN 4.9 10/05/2013    PROTTOTAL 8.7 10/05/2013    ALT 32 10/05/2013    AST 28 10/05/2013    ALKPHOS 82 10/05/2013    BILITOTAL 0.3 10/05/2013     OTHER:   Lab Results   Component Value Date    JACKSON 9.9 10/05/2013       Anesthesia Plan    ASA Status:  2   NPO Status:  NPO Appropriate    Anesthesia Type: General.     - Airway: ETT   Induction: Intravenous, Propofol.   Maintenance: Balanced.        Consents    Anesthesia Plan(s) and associated risks, benefits, and realistic alternatives discussed. Questions answered and patient/representative(s) expressed understanding.    - Discussed:     - Discussed with:  Patient         Postoperative Care    Pain management: Multi-modal analgesia.   PONV prophylaxis: Ondansetron (or other 5HT-3), Background Propofol Infusion, Dexamethasone or Solumedrol     Comments:                Vanessa Grier MD

## 2022-02-08 NOTE — BRIEF OP NOTE
[facility]    Brief Operative Note    Pre-operative diagnosis: Malignant neoplasm of lower-outer quadrant of right breast of female, estrogen receptor positive (H) [C50.511, Z17.0]  Post-operative diagnosis Same    Procedure:    right breast skin sparing mastectomy with right sentinel lymph node biopsy and right axillary lymph node dissection    Surgeon:   Panel 1:     * Yazmin Krueger MD - Primary     * Merry Bardales PA-C - Assisting    Anesthesia: General     Estimated Blood Loss: 20cc from Dr. Krueger's portion    Specimens:   ID Type Source Tests Collected by Time Destination   1 : right axillary sentinel lymph node #1 Tissue Lymph Node(s), Axillary, Right SURGICAL PATHOLOGY EXAM Yazmin Krueger MD 2/8/2022  2:42 PM    2 : right axillary sentinel lymph node #2 Tissue Lymph Node(s), Axillary, Right SURGICAL PATHOLOGY EXAM Yazmin Krueger MD 2/8/2022  2:42 PM    3 : right breast mastectomy  Tissue Breast, Right SURGICAL PATHOLOGY EXAM Yazmin Kreuger MD 2/8/2022  2:50 PM    4 : right breast axillary tissue Tissue Breast, Right SURGICAL PATHOLOGY EXAM Yazmin Krueger MD 2/8/2022  2:51 PM    5 : right axillary lymph node disection Tissue Lymph Node(s), Axillary, Right SURGICAL PATHOLOGY EXAM Yazmin Krueger MD 2/8/2022  3:16 PM      Findings:  As above. Macrometastasis to one axillary lymph node noted on frozen section (additional nodes not yet examined by pathology). Right axillary dissection performed. Dr. Hernandez to follow with reconstruction. No immediate complications. See operative report for full details.       Merry Bardales PA-C  [facility]  [phone]

## 2022-02-08 NOTE — ANESTHESIA CARE TRANSFER NOTE
Patient: Danielle Thrasher    Procedure: Procedure(s):  right breast skin sparing mastectomy with right sentinel lymph node biopsy, axillary disection  RIGHT FIRST STAGE BREAST RECONSTRUCTION WITH TISSUE EXPANDER,  ACELLULAR DERMAL MATRIX       Diagnosis: Malignant neoplasm of lower-outer quadrant of right breast of female, estrogen receptor positive (H) [C50.511, Z17.0]  Diagnosis Additional Information: No value filed.    Anesthesia Type:   General     Note:    Oropharynx: oropharynx clear of all foreign objects  Level of Consciousness: awake  Oxygen Supplementation: face mask  Level of Supplemental Oxygen (L/min / FiO2): 4  Independent Airway: airway patency satisfactory and stable  Dentition: dentition unchanged  Vital Signs Stable: post-procedure vital signs reviewed and stable  Report to RN Given: handoff report given  Patient transferred to: PACU    Handoff Report: Identifed the Patient, Identified the Reponsible Provider, Reviewed the pertinent medical history, Discussed the surgical course, Reviewed Intra-OP anesthesia mangement and issues during anesthesia, Set expectations for post-procedure period and Allowed opportunity for questions and acknowledgement of understanding      Vitals:  Vitals Value Taken Time   /73    Temp     Pulse 86 02/08/22 1629   Resp 15 02/08/22 1629   SpO2 100    Vitals shown include unvalidated device data.    Electronically Signed By: FANNY Srinivasan CRNA  February 8, 2022  4:30 PM

## 2022-02-08 NOTE — ANESTHESIA PROCEDURE NOTES
Airway       Patient location during procedure: OR       Procedure Start/Stop Times: 2/8/2022 1:50 PM  Staff -        Anesthesiologist:  Linden Martinez MD       CRNA: Ann Wallace APRN CRNA       Performed By: CRNA  Consent for Airway        Urgency: elective  Indications and Patient Condition       Indications for airway management: emile-procedural and airway protection       Induction type:intravenous       Mask difficulty assessment: 1 - vent by mask    Final Airway Details       Final airway type: endotracheal airway       Successful airway: ETT - single and Oral  Endotracheal Airway Details        ETT size (mm): 7.0       Cuffed: yes       Successful intubation technique: direct laryngoscopy       DL Blade Type: Yeh 2       Grade View of Cords: 1       Adjucts: stylet       Position: Right       Measured from: lips       Secured at (cm): 22       Bite block used: None    Post intubation assessment        Placement verified by: capnometry, equal breath sounds and chest rise        Number of attempts at approach: 1       Number of other approaches attempted: 0       Secured with: pink tape       Ease of procedure: easy       Dentition: Unchanged

## 2022-02-09 VITALS
BODY MASS INDEX: 28.93 KG/M2 | HEART RATE: 60 BPM | RESPIRATION RATE: 14 BRPM | HEIGHT: 63 IN | OXYGEN SATURATION: 97 % | SYSTOLIC BLOOD PRESSURE: 134 MMHG | DIASTOLIC BLOOD PRESSURE: 77 MMHG | TEMPERATURE: 97.9 F | WEIGHT: 163.3 LBS

## 2022-02-09 PROCEDURE — 258N000003 HC RX IP 258 OP 636: Performed by: PLASTIC SURGERY

## 2022-02-09 PROCEDURE — 250N000011 HC RX IP 250 OP 636: Performed by: PLASTIC SURGERY

## 2022-02-09 PROCEDURE — 250N000013 HC RX MED GY IP 250 OP 250 PS 637: Performed by: PLASTIC SURGERY

## 2022-02-09 PROCEDURE — 250N000013 HC RX MED GY IP 250 OP 250 PS 637: Performed by: PHYSICIAN ASSISTANT

## 2022-02-09 RX ORDER — CEPHALEXIN 500 MG/1
500 CAPSULE ORAL EVERY 6 HOURS
Qty: 40 CAPSULE | Refills: 1 | Status: SHIPPED | OUTPATIENT
Start: 2022-02-09 | End: 2022-04-12

## 2022-02-09 RX ORDER — METHOCARBAMOL 750 MG/1
750 TABLET, FILM COATED ORAL EVERY 6 HOURS PRN
Qty: 40 TABLET | Refills: 1 | Status: SHIPPED | OUTPATIENT
Start: 2022-02-09 | End: 2022-05-04

## 2022-02-09 RX ORDER — OXYCODONE HYDROCHLORIDE 10 MG/1
10 TABLET ORAL EVERY 4 HOURS PRN
Qty: 30 TABLET | Refills: 0 | Status: SHIPPED | OUTPATIENT
Start: 2022-02-09 | End: 2022-05-04

## 2022-02-09 RX ADMIN — POLYETHYLENE GLYCOL 3350 17 G: 17 POWDER, FOR SOLUTION ORAL at 08:41

## 2022-02-09 RX ADMIN — SENNOSIDES AND DOCUSATE SODIUM 1 TABLET: 50; 8.6 TABLET ORAL at 08:41

## 2022-02-09 RX ADMIN — CEPHALEXIN 500 MG: 500 CAPSULE ORAL at 11:36

## 2022-02-09 RX ADMIN — CEFAZOLIN 1 G: 1 INJECTION, POWDER, FOR SOLUTION INTRAMUSCULAR; INTRAVENOUS at 05:07

## 2022-02-09 RX ADMIN — METHOCARBAMOL 750 MG: 750 TABLET ORAL at 08:40

## 2022-02-09 RX ADMIN — ACETAMINOPHEN 975 MG: 325 TABLET, FILM COATED ORAL at 10:58

## 2022-02-09 RX ADMIN — ACETAMINOPHEN 975 MG: 325 TABLET, FILM COATED ORAL at 02:10

## 2022-02-09 RX ADMIN — POTASSIUM CHLORIDE, DEXTROSE MONOHYDRATE AND SODIUM CHLORIDE: 150; 5; 450 INJECTION, SOLUTION INTRAVENOUS at 05:04

## 2022-02-09 NOTE — ANESTHESIA POSTPROCEDURE EVALUATION
Patient: Danielle Thrasher    Procedure: Procedure(s):  right breast skin sparing mastectomy with right sentinel lymph node biopsy, axillary disection  RIGHT FIRST STAGE BREAST RECONSTRUCTION WITH TISSUE EXPANDER,  ACELLULAR DERMAL MATRIX       Diagnosis:Malignant neoplasm of lower-outer quadrant of right breast of female, estrogen receptor positive (H) [C50.511, Z17.0]  Diagnosis Additional Information: No value filed.    Anesthesia Type:  General    Note:  Disposition: Admission   Postop Pain Control: Uneventful            Sign Out: Well controlled pain   PONV: No   Neuro/Psych: Uneventful            Sign Out: Acceptable/Baseline neuro status   Airway/Respiratory: Uneventful            Sign Out: Acceptable/Baseline resp. status   CV/Hemodynamics: Uneventful            Sign Out: Acceptable CV status; No obvious hypovolemia; No obvious fluid overload   Other NRE: NONE   DID A NON-ROUTINE EVENT OCCUR? No           Last vitals:  Vitals Value Taken Time   /73 02/08/22 1745   Temp 36.9  C (98.4  F) 02/08/22 1748   Pulse 59 02/08/22 1751   Resp 23 02/08/22 1751   SpO2 97 % 02/08/22 1752   Vitals shown include unvalidated device data.    Electronically Signed By: Raj Espinoza DO, DO  February 8, 2022  9:44 PM

## 2022-02-09 NOTE — PLAN OF CARE
[id number]. A&Ox4, VSS on RA. Pain is minimal, patient denies need for pain medications. Denies nausea. Patient is up with SBA, voiding adequately in bathroom. Popeye drain to R upper chest is putting out adequate bloody output, dressing CDI. ACE wrap over chest. PIV D5 1/2 NS K infusing at 100ml. Intermittent abx. Discharge pending.

## 2022-02-09 NOTE — OP NOTE
[facility] Breast Surgery Operative Note    PREOPERATIVE DIAGNOSIS:  Right breast cancer    POSTOPERATIVE DIAGNOSIS:  Same, pathology pending    PROCEDURE:    1. Right skin sparing mastectomy   2. Right sentinel lymph node biopsy  3. Right axillary lymph node dissection  4. Reconstruction per Dr. Hernandez, please see their operative report for details regarding their portion of the procedure.     SURGEON:  Yazmin Krueger MD    ASSISTANT:  Merry Bardales PA-C  The physician s assistant was medically necessary for their expertise in retraction and suctioning.    ANESTHESIA:  General.    BLOOD LOSS: 25ml    FINDINGS: positive sentinel lymph node,  node dissection completed    INDICATIONS:   Danielle is a 51yof who presented with right breast cancer. Given the size of disease relative to her breast size, she elected to proceed with right mastectomy with SLNB and immediate reconstruction.     DETAILS OF PROCEDURE:     The patient was taken to the operating room and placed in supine position and general anesthesia by endotracheal tube.   Perioperative antiobiotics were given. SCDs were placed on the lower extremities. A daigle catheter was placed using sterile technique. Prior to coming to the operating room, patient had the right breast injected with radiolabeled sulfur colloid.   The patient was also marked by Dr. Hernandez with skin sparing simple periareolar ellipse  incisions. The breasts and axilla were prepped with hibaclens and draped in the usual sterile fashion.  The timeout procedure was performed.      Starting on the right side, a periareolar ellipse skin incision was made following the pre operative markings with a #10 scalpel blade and deepened with electrocautery.  The superior flap was raised with electrocautery up to the top edge of the breast tissue just under the clavicle.  The inferior flap was similarly raised using the cautery down to the inframammary fold.  Flaps were raised medially to the lateral  aspect of the sternum and laterally to the lateral chest wall. The breast tissue was then elevated off of the pectoralis fascia with cautery. The fascia was excised with the breast tissue.  Once the breast was removed, it was oriented with sutures with a short suture superior and long suture lateral.  The breast was sent to pathology to be placed in formalin and have routine pathology exam.        Attention was then turned to the right axilla.  The fascia was incised along the edge of the pectoralis muscle.  The Neoprobe was used to identify the site of increased radioactivity. There was axillary breast tissue identified. This was excised and sent to pathology as such.  Three nodes were then excised which had counts of >800 and 20.   The nodes were sent to pathology for review with frozen section. Pathology called in and reported the first node had a macrometastasis. There were two additional nodes and plan was for routine evaluation of these given the positive initial sentinel node. We then identified the axillary vein, the thoracodorsal complex and the long thoracic nerve. These structures were protected and I used cautery to excise the axillary lymphatic tissue inferior to the axillary vein and anterior to thoracodorsal complex including level 1 and 2 of the axilla. I did not skeletonize the structures to reduce risk of lymphedema. A moist laparotomy pad was placed within the right chest wound.       Dr. Hernandez then performed their portion of the procedure with reconstruction. Please see their operative report for details.     Yazmin Krueger MD

## 2022-02-09 NOTE — DISCHARGE SUMMARY
"Discharge Summary    Danielle Thrasher MRN# [id number]   YOB: 1970 Age: 51 year old     Date of Admission:  2/8/2022  Date of Discharge:  2/9/2022  Admitting Physician:  Linden Hernandez MD  Discharge Physician:  Linden Hernandez MD  Discharging Service:  Plastic and Reconstructive Surgery     Primary Provider: Mehnaz Cabrera          Admission Diagnoses:   Malignant neoplasm of lower-outer quadrant of right breast of female, estrogen receptor positive (H) [C50.511, Z17.0]          Discharge Diagnosis:     same             Discharge Disposition:     Discharged to home             Condition on Discharge:     Discharge condition: Stable   Discharge vitals: Blood pressure 130/65, pulse 64, temperature 97.8  F (36.6  C), temperature source Oral, resp. rate 14, height 1.6 m (5' 3\"), weight 74.1 kg (163 lb 4.8 oz), last menstrual period 01/12/2022, SpO2 97 %, not currently breastfeeding.   Code status on discharge: Full Code           Procedures / Labs / Imaging:   Right skin sparing mastectomy, SLN bx, full ANDx and first stage breast reconstruction with tissue expander.          Medications Prior to Admission:     Medications Prior to Admission   Medication Sig Dispense Refill Last Dose     acetaminophen (TYLENOL) 500 MG tablet Take 500-1,000 mg by mouth every 6 hours as needed for mild pain   Past Week at prn     albuterol (PROAIR HFA/PROVENTIL HFA/VENTOLIN HFA) 108 (90 Base) MCG/ACT inhaler Inhale 2 puffs into the lungs every 4 hours as needed for shortness of breath / dyspnea or wheezing 18 g 4  at PRN     Ascorbic Acid (VITAMIN C PO) Take 500 mg by mouth   2/7/2022 at am     calcium carbonate (OS-JACKSON 500 MG [place]. [place]) 500 MG tablet Take 500 mg by mouth daily   Past Month at am     cetirizine (ZYRTEC) 10 MG tablet Take 1 tablet by mouth daily. 90 tablet 3 2/7/2022 at am     citalopram (CELEXA) 40 MG tablet Take 1 tablet (40 mg) by mouth daily 90 tablet 3 2/7/2022 at pm     fluticasone " (FLONASE) 50 MCG/ACT nasal spray Spray 1 spray into both nostrils daily 16 g 11 2/7/2022 at am     Magnesium 400 MG TABS Take by mouth daily   Past Month at am     Multiple Vitamin (MULTI-VITAMIN) per tablet Take 1 tablet by mouth daily. 90 tablet 3 2/7/2022 at am     Omega-3 Fatty Acids (OMEGA-3 FISH OIL PO) Take by mouth daily   1/31/2022 at am             Discharge Medications:     Current Discharge Medication List      START taking these medications    Details   cephALEXin (KEFLEX) 500 MG capsule Take 1 capsule (500 mg) by mouth every 6 hours Take as long as drains in place.  Qty: 40 capsule, Refills: 1    Associated Diagnoses: Malignant neoplasm of lower-outer quadrant of right female breast, unspecified estrogen receptor status (H)      methocarbamol (ROBAXIN) 750 MG tablet Take 1 tablet (750 mg) by mouth every 6 hours as needed for muscle spasms or other (pain)  Qty: 40 tablet, Refills: 1    Associated Diagnoses: Malignant neoplasm of lower-outer quadrant of right female breast, unspecified estrogen receptor status (H)      oxyCODONE (ROXICODONE) 10 MG tablet Take 1 tablet (10 mg) by mouth every 4 hours as needed for severe pain ((pain rating 7-10))  Qty: 30 tablet, Refills: 0    Associated Diagnoses: Malignant neoplasm of lower-outer quadrant of right female breast, unspecified estrogen receptor status (H)         CONTINUE these medications which have NOT CHANGED    Details   acetaminophen (TYLENOL) 500 MG tablet Take 500-1,000 mg by mouth every 6 hours as needed for mild pain      albuterol (PROAIR HFA/PROVENTIL HFA/VENTOLIN HFA) 108 (90 Base) MCG/ACT inhaler Inhale 2 puffs into the lungs every 4 hours as needed for shortness of breath / dyspnea or wheezing  Qty: 18 g, Refills: 4    Comments: Pharmacy may dispense brand covered by insurance (Proair, or proventil or ventolin or generic albuterol inhaler)  Associated Diagnoses: Intermittent asthma, uncomplicated      Ascorbic Acid (VITAMIN C PO) Take 500 mg  by mouth      calcium carbonate (OS-JACKSON 500 MG [place]. [place]) 500 MG tablet Take 500 mg by mouth daily      cetirizine (ZYRTEC) 10 MG tablet Take 1 tablet by mouth daily.  Qty: 90 tablet, Refills: 3    Associated Diagnoses: Allergic rhinitis      citalopram (CELEXA) 40 MG tablet Take 1 tablet (40 mg) by mouth daily  Qty: 90 tablet, Refills: 3    Associated Diagnoses: BRIDGET (generalized anxiety disorder)      fluticasone (FLONASE) 50 MCG/ACT nasal spray Spray 1 spray into both nostrils daily  Qty: 16 g, Refills: 11    Associated Diagnoses: Allergic rhinitis, unspecified seasonality, unspecified trigger      Magnesium 400 MG TABS Take by mouth daily      Multiple Vitamin (MULTI-VITAMIN) per tablet Take 1 tablet by mouth daily.  Qty: 90 tablet, Refills: 3    Associated Diagnoses: Routine general medical examination at a health care facility      Omega-3 Fatty Acids (OMEGA-3 FISH OIL PO) Take by mouth daily                   Consultations:     No consultations were requested during this admission              Hospital Course:     The patient's postop course was uneventful             Significant Results:     None             Pending Results:     None           Discharge Instructions and Follow-Up:     See discharge orders.

## 2022-02-09 NOTE — DISCHARGE INSTRUCTIONS
[facility]  Discharge Instructions: Post-Operative Mastectomy with Reconstruction    ACTIVITY    Take frequent, short walks and increase your activity gradually.      Avoid strenuous physical activity or heavy lifting greater than 10 pounds. You may climb stairs.     Gentle rotation and stretching of your arms and shoulders will prevent joint stiffness.    You may drive without restrictions when you are not using any prescription pain medication and feel comfortable in a car.    You may return to work/school when you are comfortable without any prescription pain medication.    DRAIN CARE    You have drain(s) at your surgical site.    In order to empty a drain, open the tab/vent on the drain and record how much liquid you remove. To properly close the drain, squeeze the bulb (with tab/vent open) then close the tab while still squeezing the bulb. You should notice that liquid moves in the tubing toward the bulb if it is properly closed.     You should also strip the drain tubing once daily to avoid any clogging. You do this by gently pinching the drain, starting near the skin, and stretching the tubing out this way until you reach the bulb. Do not pull hard on the drain tubing to the point of pulling the drain away from the skin.     DIET    Start with liquids, then gradually resume your regular diet as tolerated.     Drink plenty of fluids to stay hydrated.    PAIN    Expect some tenderness and discomfort at the incision site(s).  Use the prescribed pain medication at your discretion.  Expect gradual resolution of your pain over several days.    Do not drink alcohol or drive while you are taking pain medications.    You may apply ice to your incisions in 20 minute intervals as needed.    EXPECTATIONS    Pain medications can cause constipation.  Limit use when possible.  Take the prescribed stool softener/stimulant twice daily as needed. You may take an over the counter laxative  such as milk of magnesia or a Dulcolax suppository, if needed.     You may discontinue these medications once you are having regular bowel movements and/or are no longer taking your narcotic pain medication.    RETURN APPOINTMENT    Follow-up with Dr. Hernandez  as directed.    Follow-up with Dr. Underwood in approximately 2 weeks. Call our office at [phone] to schedule this appointment.    CALL DR HERNANDEZ'S OR DR UNDERWOOD'S OFFICE IF YOU HAVE:     Chills or fever above 101 F.    Increased redness, warmth, or drainage at your incisions.    Significant bleeding or swelling.    Pain not relieved by your pain medication or rest.    Increasing pain after the first 48 hours.    Any other concerns or questions.

## 2022-02-09 NOTE — PROGRESS NOTES
"Plastic Surgery POD #1    Pain controlled. No nausea  /65   Pulse 64   Temp 97.8  F (36.6  C) (Oral)   Resp 14   Ht 1.6 m (5' 3\")   Wt 74.1 kg (163 lb 4.8 oz)   LMP 01/12/2022 (Approximate)   SpO2 97%   BMI 28.93 kg/m    ABDOUL serosanguinous, volumes ok  No hematoma  Skin flaps healthy    Saline lock and change to bra  Home today  Fully instructed  F/u next Tuesday.  "

## 2022-02-09 NOTE — PROGRESS NOTES
"General Surgery Progress Note    Admission Date: 2/8/2022  Today's Date: 2/9/2022         Assessment:      Danielle Thrasher is a 51 year old female POD 1 s/p right skin sparing mastectomy with right SLNB and right axillary lymph node dissection ([place]) and immediate reconstruction ([place])         Plan:   - Discharge home today. Instructions reviewed with patient and spouse, questions answered.   - Follow-up with Dr. Hernandez next week as scheduled, Dr. Krueger in 2 weeks  - Drain teaching complete  - Meds filled here  - Continue bowel regimen, pain meds as needed  - IV fluids saline locked  - Dr. Krueger will call with final pathology once available        Interval History:   Afebrile, VSS on room air. Anna feels that she is doing well, ready for discharge. Incisions not examined by me today, patient already seen by Dr. Krueger and Dr. Hernandez this morning.           Physical Exam:   /77 (BP Location: Right arm)   Pulse 60   Temp 97.9  F (36.6  C) (Oral)   Resp 14   Ht 1.6 m (5' 3\")   Wt 74.1 kg (163 lb 4.8 oz)   LMP 01/12/2022 (Approximate)   SpO2 97%   BMI 28.93 kg/m    I/O last 3 completed shifts:  In: 2448 [I.V.:2448]  Out: 83 [Urine:3; Drains:55; Blood:25]  General: NAD, pleasant, alert and oriented x3  Respiratory: non-labored breathing    -------------------------------    GAMALIEL VaughanC  [facility]  [phone]        "

## 2022-02-09 NOTE — PLAN OF CARE
Pt discharged at this time and escorted to door in w/c by NA and  driving patient home.  Prior to this discharge instructions given and Rx's filled for home and given to patient with instructions. Reviewed how to empty strip and record output of ABDOUL  Pt verbalizes understanding of these instructions and has provider's phone numbers.ace wrap removed by Dr. Krueger and noted tegraderm in place. Abdoul intact pt's bra put on . Pt tolerating diet and activity and voiding , denies pain at discharge.

## 2022-02-09 NOTE — PROGRESS NOTES
POD 0 from R mastectomy w/ reconstruction. Pt arrived to unit from PACU at 1815, admitted to room 2216. VSS on RA, Capno in place. Denies nausea. Mild pain, denies need for pain medication at this time. D5 NS K infusing at 100 ml/hr. ABDOUL x1 to R upper chest, bloody output. BS hypoactive. Not oob yet.  at bedside.

## 2022-02-11 ENCOUNTER — TELEPHONE (OUTPATIENT)
Dept: SURGERY | Facility: CLINIC | Age: 52
End: 2022-02-11
Payer: COMMERCIAL

## 2022-02-11 PROCEDURE — 88305 TISSUE EXAM BY PATHOLOGIST: CPT | Mod: 26 | Performed by: PATHOLOGY

## 2022-02-11 PROCEDURE — 88331 PATH CONSLTJ SURG 1 BLK 1SPC: CPT | Mod: 26 | Performed by: PATHOLOGY

## 2022-02-11 PROCEDURE — 88307 TISSUE EXAM BY PATHOLOGIST: CPT | Mod: 26 | Performed by: PATHOLOGY

## 2022-02-11 NOTE — TELEPHONE ENCOUNTER
I called Danielle and discussed her pathology report. I would like to present her at our next tumor board to have our radiation oncologist weigh in on benefit of radiation. She is in agreement with this. We did discuss that with the size of the tumor and 1/7 nodes involved that I believe radiation would be helpful. She is meeting with Dr. Huitron in a couple weeks. I will forward her pathology on to him to have him decide on oncotype ordering.     Yazmin Kruegre MD  [facility]  [phone]

## 2022-02-14 ENCOUNTER — PATIENT OUTREACH (OUTPATIENT)
Dept: ONCOLOGY | Facility: CLINIC | Age: 52
End: 2022-02-14
Payer: COMMERCIAL

## 2022-02-14 NOTE — PROGRESS NOTES
Received final pathology report from patient surgery on 2/8. Per Dr Huitron request, information completed for order # [id number] to [facility] for oncotype result.  Will continue to monitor for results.  Mary Jane Lu RN  Care Coordinator

## 2022-02-17 ENCOUNTER — VIRTUAL VISIT (OUTPATIENT)
Dept: PSYCHOLOGY | Facility: CLINIC | Age: 52
End: 2022-02-17
Payer: COMMERCIAL

## 2022-02-17 DIAGNOSIS — F43.21 ADJUSTMENT DISORDER WITH DEPRESSED MOOD: ICD-10-CM

## 2022-02-17 DIAGNOSIS — F41.1 GAD (GENERALIZED ANXIETY DISORDER): Primary | ICD-10-CM

## 2022-02-17 PROCEDURE — 90834 PSYTX W PT 45 MINUTES: CPT | Mod: GT | Performed by: COUNSELOR

## 2022-02-17 NOTE — PROGRESS NOTES
Progress Note    Patient Name: Danielle Thrasher  Date: 2022           Service Type: Individual      Session Start Time: 2:30pm  Session End Time: 3:13pm     Session Length: 38-52 min minutes    Session #: 9    Attendees: Client attended alone    Service Modality:  Video Visit:      Provider verified identity through the following two step process.  Patient provided:  Patient  and Patient address    Telemedicine Visit: The patient's condition can be safely assessed and treated via synchronous audio and visual telemedicine encounter.      Reason for Telemedicine Visit: Services only offered telehealth    Originating Site (Patient Location): Patient's home    Distant Site (Provider Location): Provider Remote Setting- Home Office    Consent:  The patient/guardian has verbally consented to: the potential risks and benefits of telemedicine (video visit) versus in person care; bill my insurance or make self-payment for services provided; and responsibility for payment of non-covered services.     Patient would like the video invitation sent by:  My Chart    Mode of Communication:  Video Conference via Amwell    As the provider I attest to compliance with applicable laws and regulations related to telemedicine.     Treatment Plan Last Reviewed: 2021 next review date 2022  PHQ-9 / BRIDGET-7 : see epic updates    DATA  Interactive Complexity: No  Crisis: No       Progress Since Last Session (Related to Symptoms / Goals / Homework):   Symptoms: Improving Patient is working towards therapeutic goals    Homework: Completed in session      Episode of Care Goals: Minimal progress - ACTION (Actively working towards change); Intervened by reinforcing change plan / affirming steps taken     Current / Ongoing Stressors and Concerns:  Patient reports being diagnosed with stage 2 breast cancer. She processed feelings related to this change and fears about the future. Pt  discussed emotions surrounding recent surgery and recovery process.      Treatment Objective(s) Addressed in This Session:   Goal - Depression: Patient will alleviate depressive symptoms and return to previous level of effective functioning.  I will know I've met my goal when I don't feel in this hole anymore.     Objective #A (Client Action)  Patient will describe situations, thoughts, feelings, and actions associated with depression, their impact on functioning and attempts to resolve them. Patient will do this 4 out of 7 days of the week.   - Worked to identify triggers for depression    Objective #B  Patient will use cognitive strategies identified in therapy to challenge negative thought patterns 90% of the time.  - Worked to decrease pt's negative self talk    Objective #C  Patient will identify and use at least three coping skills and distraction and diversion activities to manage feelings of depression. Pt will use these skills at least three times per week.  - Worked to increase use of coping skills throughout the day/week.      Goal- Anxiety: Patient will reduce overall frequency, intensity, and duration of the anxiety so that daily functioning is not impaired.     I will know I've met my goal when I am less anxious.      Objective #A (Client Action)  Patient will describe situations, thoughts, feelings, and actions associated with anxieties and worries, their impact on functioning and attempts to resolve them. Patient will do this 4 out of 7 days of the week.   - Worked to identify triggers for anxiety    Objective #B  Patient will use cognitive strategies identified in therapy to challenge negative thought patterns 90% of the time.  - Worked to decrease pt's negative self talk    Objective #C  Patient will identify and use at least three coping skills and distraction and diversion activities to manage feelings of anxiety. Pt will use these skills at least three times per week.  - Worked to increase use  of coping skills throughout the day/week.       Intervention:  Therapist met with patient to review goals and interventions. Therapist utilized reflective listening as patient gave brief reflection of the last few weeks. Patient processed cancer dx, surgery, recovery process, and emotions attacked.   Utilized person centered and CBT modalities to support the pt as she spoke about her experience. Therapist worked to build a non-judgemental and empathetic space for patient to process emotions. Worked to encourage dialectic thought processes allowing the pt to both process the experienced loss while reframing cognitive distortions.        ASSESSMENT: Current Emotional / Mental Status (status of significant symptoms):   Risk status (Self / Other harm or suicidal ideation)   Patient denies current fears or concerns for personal safety.   Patient denies current or recent suicidal ideation or behaviors.   Patient denies current or recent homicidal ideation or behaviors.   Patient denies current or recent self injurious behavior or ideation.   Patient denies other safety concerns.   Patient reports there has been no change in risk factors since their last session.     Patient reports there has been no change in protective factors since their last session.     Recommended that patient call 911 or go to the local ED should there be a change in any of these risk factors.     Appearance:   Appropriate    Eye Contact:   Good    Psychomotor Behavior: Normal    Attitude:   Cooperative    Orientation:   All   Speech    Rate / Production: Normal     Volume:  Normal    Mood:    Sad    Affect:    Appropriate  Tearful   Thought Content:  Clear    Thought Form:  Coherent  Logical    Insight:    Fair      Medication Review:   No changes to current psychiatric medication(s)     Medication Compliance:   Yes     Changes in Health Issues:   None reported     Chemical Use Review:   Substance Use: Chemical use reviewed, no active concerns  identified      Tobacco Use: No current tobacco use.      Diagnosis:  1. BRIDGET (generalized anxiety disorder)    2. Adjustment disorder with depressed mood        Collateral Reports Completed:   Not Applicable    PLAN: (Patient Tasks / Therapist Tasks / Other)  1)Patient will reframe thoughts 3 times per week  2)Patient will utilize self compassion exercise 3 times per week  3)Next session scheduled for 3 weeks out      MOSHE ZAK BUTLER, [facility]       2/17/2022                                                                       ______________________________________________________________________    Treatment Plan    Patient's Name: Danielle Thrasher  YOB: 1970    Date: 10/19/2021      DSM5 Diagnoses: 300.02 (F41.1) Generalized Anxiety Disorder or Adjustment Disorders  309.0 (F43.21) With depressed mood  Psychosocial / Contextual Factors: Pt is a 51 year old female who works remote and lives with her .  WHODAS: 23    Referral / Collaboration:  none needed.    Anticipated number of session or this episode of care: 32-36      MeasurableTreatment Goal(s) related to diagnosis / functional impairment(s)  Goal - Depression: Patient will alleviate depressive symptoms and return to previous level of effective functioning.  I will know I've met my goal when I don't feel in this hole anymore.     Objective #A (Client Action)  Patient will describe situations, thoughts, feelings, and actions associated with depression, their impact on functioning and attempts to resolve them. Patient will do this 4 out of 7 days of the week.     Intervention(s)  Therapist will provide psychoeducation, behavioral activation, and cognitive restructuring.    Status: Continued- Date: 1/19/2022      Objective #B  Patient will use cognitive strategies identified in therapy to challenge negative thought patterns 90% of the time.    Intervention(s)  Therapist will provide psychoeducation, behavioral activation, and cognitive  restructuring.    Status: Continued- Date: 1/19/2022    Objective #C  Patient will identify and use at least three coping skills and distraction and diversion activities to manage feelings of depression. Pt will use these skills at least three times per week.    Intervention(s)  Therapist will provide psychoeducation, behavioral activation, and cognitive restructuring.    Status: Continued- Date: 1/19/2022      Goal- Anxiety: Patient will reduce overall frequency, intensity, and duration of the anxiety so that daily functioning is not impaired.     I will know I've met my goal when I am less anxious.      Objective #A (Client Action)  Patient will describe situations, thoughts, feelings, and actions associated with anxieties and worries, their impact on functioning and attempts to resolve them. Patient will do this 4 out of 7 days of the week.     Intervention(s)  Therapist will provide psychoeducation, behavioral activation, and cognitive restructuring.  Status: Continued- Date: 1/19/2022    Objective #B  Patient will use cognitive strategies identified in therapy to challenge negative thought patterns 90% of the time.    Intervention(s)  Therapist will provide psychoeducation, behavioral activation, and cognitive restructuring.  Status: Continued- Date: 1/19/2022    Objective #C  Patient will identify and use at least three coping skills and distraction and diversion activities to manage feelings of anxiety. Pt will use these skills at least three times per week.    Intervention(s)  Therapist will provide psychoeducation, behavioral activation, and cognitive restructuring.  Status: Continued- Date: 1/19/2022          Patient has reviewed and agreed to the above plan.      MOSHE BUTLER, [facility]  10/26/2021, 1/19/2022

## 2022-02-18 ENCOUNTER — PATIENT OUTREACH (OUTPATIENT)
Dept: ONCOLOGY | Facility: CLINIC | Age: 52
End: 2022-02-18
Payer: COMMERCIAL

## 2022-02-18 NOTE — PROGRESS NOTES
Received telephone call from Elinor at [employer] [facility] on fax sent to our clinic to inform of obtaining authorization from patient insurance [facility] prior to result. Writer agrees to follow up with [facility] for authorization for oncotype result.  Mary Jane Lu RN  Care Coordinator- Cancer Clinic

## 2022-02-21 ENCOUNTER — PATIENT OUTREACH (OUTPATIENT)
Dept: ONCOLOGY | Facility: CLINIC | Age: 52
End: 2022-02-21
Payer: COMMERCIAL

## 2022-02-21 NOTE — PROGRESS NOTES
Received request to obtain PA for Oncotype/Endomondo to complete test # [id number].  Telephone call place to [facility]. Spoke with Svetlana. Received prior authorization # [id number]  Effective 2/14-22-5/14/22.  TC to [employer]. Spoke with billing dept to update on prior authorization number. Estimated result date is 3/2/22.  Mary Jane Lu RN  Care Coordinator  Cancer Clinic

## 2022-03-01 ENCOUNTER — PATIENT OUTREACH (OUTPATIENT)
Dept: ONCOLOGY | Facility: CLINIC | Age: 52
End: 2022-03-01

## 2022-03-01 ENCOUNTER — ONCOLOGY VISIT (OUTPATIENT)
Dept: ONCOLOGY | Facility: CLINIC | Age: 52
End: 2022-03-01
Payer: COMMERCIAL

## 2022-03-01 VITALS
WEIGHT: 171.4 LBS | OXYGEN SATURATION: 100 % | SYSTOLIC BLOOD PRESSURE: 112 MMHG | DIASTOLIC BLOOD PRESSURE: 71 MMHG | HEIGHT: 63 IN | HEART RATE: 60 BPM | BODY MASS INDEX: 30.37 KG/M2

## 2022-03-01 DIAGNOSIS — Z17.0 MALIGNANT NEOPLASM OF OVERLAPPING SITES OF LEFT BREAST IN FEMALE, ESTROGEN RECEPTOR POSITIVE (H): Primary | ICD-10-CM

## 2022-03-01 DIAGNOSIS — C50.812 MALIGNANT NEOPLASM OF OVERLAPPING SITES OF LEFT BREAST IN FEMALE, ESTROGEN RECEPTOR POSITIVE (H): Primary | ICD-10-CM

## 2022-03-01 PROCEDURE — 99214 OFFICE O/P EST MOD 30 MIN: CPT | Performed by: INTERNAL MEDICINE

## 2022-03-01 ASSESSMENT — PAIN SCALES - GENERAL: PAINLEVEL: MODERATE PAIN (4)

## 2022-03-01 NOTE — PATIENT INSTRUCTIONS
1) Mary Jane please check on Oncotype info (node positive or node negative).  2) Rad/onc referral.  3) BJT towards end of radiation.    Enrrique Huitron MD.

## 2022-03-01 NOTE — NURSING NOTE
"Oncology Rooming Note    March 1, 2022 3:18 PM   Danielle Thrasher is a 51 year old female who presents for:    Chief Complaint   Patient presents with     Oncology Clinic Visit     Post Op     Initial Vitals: /71 (BP Location: Right arm, Patient Position: Chair, Cuff Size: Adult Regular)   Pulse 60   Ht 1.6 m (5' 3\")   Wt 77.7 kg (171 lb 6.4 oz)   SpO2 100%   BMI 30.36 kg/m   Estimated body mass index is 30.36 kg/m  as calculated from the following:    Height as of this encounter: 1.6 m (5' 3\").    Weight as of this encounter: 77.7 kg (171 lb 6.4 oz). Body surface area is 1.86 meters squared.  Moderate Pain (4) Comment: Data Unavailable   No LMP recorded.  Allergies reviewed: Yes  Medications reviewed: Yes    Medications: Medication refills not needed today.  Pharmacy name entered into [facility]:    [id number] [place], [place] - [address] AT [facility] [place]  [facility] - [address]    Clinical concerns: Next Steps     Dr. Huitron was notified.      Yanique Landin CMA              "

## 2022-03-01 NOTE — LETTER
3/1/2022         RE: Danielle Thrasher  [address]  [place] [address]        Dear Colleague,    Thank you for referring your patient, Danielle Thrasher, to the [facility]. Please see a copy of my visit note below.    [facility] Hematology / Oncology  Progress Note  Name: Danielle Thrasher  :  1970    MRN:  [id number]    --------------------    Assessment / Plan:  Stage IIA (pT2 pN1a cM0), hormone positive, HER-2 negative, right-sided breast cancer.  # Right breast mastectomy with sentinel lymph node 2022.  # Oncotype Dx score 11.    Over the course of our visit, Danielle and I reviewed her pathology report in detail.  We reviewed the macro met in an isolated lymph node without extracapsular spread as well as 6 other normal lymph nodes.  We reviewed the large tumor with negative margins and the lack of lymphovascular invasion.  Taking all of this into account with her Oncotype score of 11, I would not recommend adjuvant chemotherapy particularly given the hormone positive lobular nature of her breast cancer.  I think any value of chemotherapy would likely be driven primarily by early menopause achieved with chemotherapy and her outcomes should be quite good based upon Plan B trial outcome data for women with low-volume antonino disease and low Oncotype score.  I would recommend adjuvant radiotherapy given the presence of the macro met as well as lack of plans for chemotherapy.  We reviewed that the goal would be reduction in local regional recurrence risk.  We reviewed the logistics of administration as well as potential side effects.  A referral has been placed.  I think the larger question right now will revolve around choice of hormone therapy and for now we'll plan to start with tamoxifen anticipating that she will likely enter menopause very soon at which point we will switch her over to an aromatase inhibitor.  We could consider  "initiation of early menopause with ovarian suppression either surgically or medically as well, but again her Oncotype score is quite low and natural menopause is likely soon.  We reviewed the logistics of tamoxifen in detail focusing on the possibility of vasomotor symptoms, arthralgias, body changes, mood changes, cognitive changes, CV risk as well as potential for bone thinning.  We will likely plan to get a DEXA scan at some point in the future and encouraged the importance of calcium and vitamin D as well as activity moving forward.  Her case will be discussed at tumor conference as well.  Case discussed w/ Dr. Krueger.    Enrrique Huitron MD    --------------------    Interval History:  Danielle returns for follow up of breast cancer accompanied by her .  She is recovering well from surgery.  She has no concerns or complaints today.    --------------------    Physical Exam:  VS: /71 (BP Location: Right arm, Patient Position: Chair, Cuff Size: Adult Regular)   Pulse 60   Ht 1.6 m (5' 3\")   Wt 77.7 kg (171 lb 6.4 oz)   SpO2 100%   BMI 30.36 kg/m    GEN: Well appearing.    Labs / Imaging / Path:  We reviewed pathology reports      Again, thank you for allowing me to participate in the care of your patient.        Sincerely,        Enrrique Huitron MD    "

## 2022-03-02 ENCOUNTER — HOSPITAL ENCOUNTER (OUTPATIENT)
Dept: PHYSICAL THERAPY | Facility: CLINIC | Age: 52
Setting detail: THERAPIES SERIES
End: 2022-03-02
Attending: PLASTIC SURGERY
Payer: COMMERCIAL

## 2022-03-02 PROCEDURE — 97140 MANUAL THERAPY 1/> REGIONS: CPT | Mod: GP | Performed by: PHYSICAL THERAPIST

## 2022-03-02 PROCEDURE — 97161 PT EVAL LOW COMPLEX 20 MIN: CPT | Mod: GP | Performed by: PHYSICAL THERAPIST

## 2022-03-02 RX ORDER — TAMOXIFEN CITRATE 20 MG/1
20 TABLET ORAL DAILY
Qty: 90 TABLET | Refills: 3 | Status: SHIPPED | OUTPATIENT
Start: 2022-03-02 | End: 2023-02-15

## 2022-03-03 ENCOUNTER — OFFICE VISIT (OUTPATIENT)
Dept: SURGERY | Facility: CLINIC | Age: 52
End: 2022-03-03
Payer: COMMERCIAL

## 2022-03-03 DIAGNOSIS — C50.911 MALIGNANT NEOPLASM OF RIGHT BREAST IN FEMALE, ESTROGEN RECEPTOR POSITIVE, UNSPECIFIED SITE OF BREAST (H): Primary | ICD-10-CM

## 2022-03-03 DIAGNOSIS — Z17.0 MALIGNANT NEOPLASM OF RIGHT BREAST IN FEMALE, ESTROGEN RECEPTOR POSITIVE, UNSPECIFIED SITE OF BREAST (H): Primary | ICD-10-CM

## 2022-03-03 LAB — SPECIMEN STATUS: NORMAL

## 2022-03-03 PROCEDURE — 99024 POSTOP FOLLOW-UP VISIT: CPT | Performed by: SURGERY

## 2022-03-03 NOTE — LETTER
3/3/2022         RE: Danielle Thrasher  [address] [place] [address]        Dear Colleague,    Thank you for referring your patient, Danielle Thrasher, to the [facility]. Please see a copy of my visit note below.    [facility] Breast Surgery Postoperative Note    S: Danielle is doing well. She has minimal pain after surgery. Feels a bit tight in the axilla.     Breasts: right breast mastectomy incision is healing well. No erythema or induration. Expander in place.     Pathology: reviewed and copy given    A/P  Danielle Thrasher is recovering from a right breast skin sparing mastectomy with right SLNB and immediate reconstruction (Dr. Hernandez) on 2/8/2022 for a 7.5cm invasive lobular carcinoma, grade 2, ER/IN positive, HER 2 negative with one of 7 axillary nodes positive for a 5.5mm macrometastasis ([id number]). She is healing very well from surgery. She has met with Dr. Huitron and follow up oncotype is pending. I did place referral to radiation oncology given size of cancer and antonino positivity. I also order PT/OT for lymphedema prevention and improved ROM/tightness.   I would like to see her back in 6 months for follow up exam. We did discuss that given the lobular type of breast cancer, I would recommend a follow up left breast mammogram in 6 months and then MRI in one year for high risk screening.     Thank you for the opportunity to help in her care.    Yazmin Krueger MD  [facility]  [phone]    Please route or send letter to:  Primary Care Provider (PCP) and Referring Provider        Again, thank you for allowing me to participate in the care of your patient.        Sincerely,        Yazmin Krueger MD

## 2022-03-03 NOTE — NURSING NOTE
Danielle Thrasher's goals for this visit include:   Chief Complaint   Patient presents with     Surgical Followup     1st post op right mastectomy       She requests these members of her care team be copied on today's visit information: no    PCP: Mehnaz Cabrera    Referring Provider:  No referring provider defined for this encounter.    There were no vitals taken for this visit.    Do you need any medication refills at today's visit? No    Yadira Williamson LPN

## 2022-03-07 LAB
PATH REPORT.COMMENTS IMP SPEC: NORMAL
PATH REPORT.FINAL DX SPEC: NORMAL
PATH REPORT.GROSS SPEC: NORMAL
PATH REPORT.INTRAOP OBS SPEC DOC: NORMAL
PATH REPORT.MICROSCOPIC SPEC OTHER STN: NORMAL
PATH REPORT.RELEVANT HX SPEC: NORMAL
PATHOLOGY SYNOPTIC REPORT: NORMAL
PHOTO IMAGE: NORMAL

## 2022-03-08 ENCOUNTER — MYC MEDICAL ADVICE (OUTPATIENT)
Dept: SURGERY | Facility: CLINIC | Age: 52
End: 2022-03-08
Payer: COMMERCIAL

## 2022-03-08 ENCOUNTER — TRANSFERRED RECORDS (OUTPATIENT)
Dept: HEALTH INFORMATION MANAGEMENT | Facility: CLINIC | Age: 52
End: 2022-03-08
Payer: COMMERCIAL

## 2022-03-08 NOTE — LETTER
[facility]    [address], [address]  [place], [place] [address]  Phone [phone]  Fax [phone] [address]  [place] [address]  Phone [phone]  Fax [phone]    [email or web address]       March 9, 2022      To whom it may concern:    I am writing to inform you that Danielle Thrasher (BD:1970) is under my medical care and I am giving permission for her to return to work on March 23, 2022.    Please feel free to contact myself or my nurse with any questions or concerns.    Sincerely,          Dr. Yazmin Krueger, Lori Lopez, RN

## 2022-03-09 NOTE — TELEPHONE ENCOUNTER
Message routed to Dr. Krueger to confirm okay for return to work on 3/23/22, and to give any recommendations regarding patient's discomfort she is experiencing at night.    Patient had a Bilateral Mastectomy with right sentinel lymph node biopsy with reconstruction by Dr. Yazmin Krueger/Dr. Linden Hernandez on 2/8/2022.      Lori Lopez RN BSN  Breast Nurse Care Coordinator  [facility]  [phone]

## 2022-03-15 ENCOUNTER — TRANSFERRED RECORDS (OUTPATIENT)
Dept: HEALTH INFORMATION MANAGEMENT | Facility: CLINIC | Age: 52
End: 2022-03-15
Payer: COMMERCIAL

## 2022-03-15 NOTE — PROGRESS NOTES
RECORDS STATUS - ALL OTHER DIAGNOSIS      RECORDS RECEIVED FROM: [facility]   DATE RECEIVED: 3/17/2022   NOTES STATUS DETAILS   OFFICE NOTE from referring provider Complete [facility]   Mehnaz Cabrera MD   OFFICE NOTE from medical oncologist Complete 3/1/2022   Malignant neoplasm of overlapping sites of left breast in female, estrogen receptor positive (H)    More in EPIC   DISCHARGE SUMMARY from hospital Complete Epic - 2/8/2022 Malignant Neoplasm of lower outer quadrant of right female breast      DISCHARGE REPORT from the ER     OPERATIVE REPORT Complete See Breast Biopsy in EPIC   MEDICATION LIST Complete [facility]   CLINICAL TRIAL TREATMENTS TO DATE     LABS     PATHOLOGY REPORTS Complete 2/8/2022   A.  Right axillary sentinel lymph node #1:  - Positive for macrometastasis of lobular carcinoma (5.5 x 5.0 mm) without extracapsular extension in single lymph node identified    B.  Right axillary sentinel lymph node #2:  - Negative for malignancy in each of 3 lymph nodes identified     C.  Right breast skin sparing mastectomy, oriented:  (SEE SYNOPTIC DIAGNOSIS)    -Invasive lobular carcinoma carcinoma     - Tumor size: 75 x 27 x 12 mm unifocal, contiguous from 12:00 to 6:00 with markers at each of these locations    - Ariella grade: II (of possible III)            - Ariella score 6 (of possible 9)             - Scoring criteria:  tubules = 3, nuclear pleomorphism =  2, mitosis = 1.                                              - Lymphovascular invasion: None identified.    - Lymph node metastasis: PRESENT, 5.5 macrometastasis without extranodal extension in sentinel lymph node #1 (total of 7 lymph nodes examined, 4 of which were sentinel nodes)    - Ductal carcinoma in-situ: None identified.    - Lobular carcinoma in-situ: PRESENT    - Margins of resection for invasive carcinoma: Uninvolved         - All margins clear by >5 mm    - Margins of resection for carcinoma in-situ: Uninvolved         - All margins clear  by >5 mm    - Staging: pT3 pN1a    - Estrogen receptor:  POSITIVE (98% on prior biopsy, [id number])    - Progesterone receptor:  POSITIVE (90% on prior biopsy, [id number])    - Her2/arun:  Non-amplified on prior biopsy, Group 5 NEREIDA Negative ([id number])    - Additional histopathologic findings: Microcalcifications associated with invasive malignancy and benign sclerosing adenosis, apocrine cyst, pseudoangiomatous stromal hyperplasia    ANYTHING RELATED TO DIAGNOSIS Complete 2/8/2022   OncotypeDx Breast       GENONOMIC TESTING     TYPE:     IMAGING (NEED IMAGES & REPORT)     CT SCANS     NM lymph Complete 2/8/2022   MRI     MAMMO Scheduled 12/22/2021   ULTRASOUND Complete US Breast    PET       Action    Action Taken 3/15/2022 1:50pm TRACIE     I called pt Danielle - unavailable.

## 2022-03-17 ENCOUNTER — OFFICE VISIT (OUTPATIENT)
Dept: RADIATION ONCOLOGY | Facility: CLINIC | Age: 52
End: 2022-03-17
Attending: INTERNAL MEDICINE
Payer: COMMERCIAL

## 2022-03-17 ENCOUNTER — PRE VISIT (OUTPATIENT)
Dept: RADIATION ONCOLOGY | Facility: CLINIC | Age: 52
End: 2022-03-17

## 2022-03-17 VITALS
HEART RATE: 68 BPM | RESPIRATION RATE: 18 BRPM | SYSTOLIC BLOOD PRESSURE: 132 MMHG | OXYGEN SATURATION: 100 % | WEIGHT: 171.5 LBS | BODY MASS INDEX: 30.38 KG/M2 | TEMPERATURE: 98.8 F | DIASTOLIC BLOOD PRESSURE: 75 MMHG

## 2022-03-17 DIAGNOSIS — C50.911 MALIGNANT NEOPLASM OF RIGHT BREAST IN FEMALE, ESTROGEN RECEPTOR POSITIVE, UNSPECIFIED SITE OF BREAST (H): ICD-10-CM

## 2022-03-17 DIAGNOSIS — Z17.0 MALIGNANT NEOPLASM OF RIGHT BREAST IN FEMALE, ESTROGEN RECEPTOR POSITIVE, UNSPECIFIED SITE OF BREAST (H): ICD-10-CM

## 2022-03-17 LAB — HCG UR QL: NEGATIVE

## 2022-03-17 PROCEDURE — 77263 THER RADIOLOGY TX PLNG CPLX: CPT | Performed by: SURGERY

## 2022-03-17 PROCEDURE — 77334 RADIATION TREATMENT AID(S): CPT | Performed by: SURGERY

## 2022-03-17 PROCEDURE — 81025 URINE PREGNANCY TEST: CPT | Performed by: SURGERY

## 2022-03-17 PROCEDURE — 77290 THER RAD SIMULAJ FIELD CPLX: CPT | Performed by: SURGERY

## 2022-03-17 PROCEDURE — 77332 RADIATION TREATMENT AID(S): CPT | Mod: 59 | Performed by: SURGERY

## 2022-03-17 PROCEDURE — 99204 OFFICE O/P NEW MOD 45 MIN: CPT | Mod: 25 | Performed by: SURGERY

## 2022-03-17 PROCEDURE — 99207 PR CDG-MDM COMPONENT: MEETS MODERATE - DOWN CODED: CPT | Performed by: SURGERY

## 2022-03-17 ASSESSMENT — PAIN SCALES - GENERAL: PAINLEVEL: MILD PAIN (2)

## 2022-03-17 NOTE — LETTER
3/17/2022         RE: Danielle Thrasher  [address] [place] [address]        Dear Colleague,    Thank you for referring your patient, Danielle Thrasher, to the [facility] RADIATION ONCOLOGY [place]. Please see a copy of my visit note below.       Department of Radiation Oncology  [facility]: Cancer Center  [facility]  [address] [place] [address] [id number] [phone]       Consultation Note    Name: Danielle Thrasher MRN: [id number]   : 1970   Date of Service: 3/17/2022  Referring: Dr. Huitron /Dr. Krueger     Reason for consultation: right breast cancer    History of Present Illness   Ms. Thrasher is 51YF with RIGHT sided ER+/ID+/Her2- [id number] (Stage IIA) invasive lobular carcinoma status post right skin sparing mastectomy with immediate reconstruction and SLNBx (Dr. Krueger, Dr. Hernandez, 22) with pathology demonstrating 7.5 cm tumor, 1/7 LN positive (5.5mm, no ABIGAIL), negative margins, no LVSI, pT3N1a. Oncotype is 11 and she will not be receiving chemotherapy, but endocrine therapy (Dr. Huitron).      Briefly, her oncologic history is as follows:     Patient initially underwent a screening mammogram in 2021 demonstrating a asymmetry in the right breast at the 12 o'clock position at middle depth, with the left breast negative for any abnormality.    She underwent a diagnostic mammogram and ultrasound on 2021 demonstrating 2 potential areas of architectural distortion in the retroareolar area.  There was a irregular hypoechoic mass with posterior acoustic shadowing measuring up to 2.5 cm in size with inversion and retraction of the nipple.  There was an adjacent mass 2 cm from the nipple at the 12 o'clock position measuring 2.5 cm in size there is no evidence of any lymphadenopathy.    She underwent biopsy on 2021.  The lesion at the 12 o'clock position, 2 cm from the nipple  returned as invasive lobular carcinoma, grade 2, ER positive/OH positive.  Lesion at the 6 o'clock position, 4 cm from the nipple returned as an invasive lobular carcinoma, grade 2 ER positive/OH positive.    She underwent right skin sparing mastectomy with immediate reconstruction and SLNBx (Dr. Krueger, Dr. Hernandez, 2/8/22) with pathology demonstrating 7.5 cm tumor, 1/7 LN positive (5.5mm, no ABIGAIL), negative margins, no LVSI.     She met with medical oncology  (Dr. Huitron), and Oncotype is 11, andhe will not be receiving chemotherapy, but endocrine therapy after radiation.     She has been undergoing expansion of the right breast tissue expander, with last performed a few days ago with Dr. Hernandez to approximately 325-350 cc. The expanders have been maximally inflated and she will not requiring further expansion.     Today, patient endorses mild right chest wall tenderness from expansion (1/10) which has decreased since last expansion. She denies drainage, fevers, chills, extremity range of motion difficulties, chest pain, dyspnea, or weight loss.     She does have a family history of breast cancer with her mother initially diagnosed in their 50s and subsequently again in the 70s, but genetic testing has been negative. Denies prior RT, no pacemaker, non smoker. She is still pre-menopausal.     Past Medical History:   Past Medical History:   Diagnosis Date     Allergic rhinitis     mold     Major depressive disorder, recurrent episode (H) 2009     Malignant neoplasm of right breast in female, estrogen receptor positive (H) 12/22/2021    Invasive Lobular     Mild persistent asthma      Patellofemoral pain syndrome      Tear meniscus knee 1998    left       Past Surgical History:   Past Surgical History:   Procedure Laterality Date     BREAST BIOPSY, CORE RT/LT Right 12/22/2021     EXCISE GANGLION WRIST      LT     LASIK       MASTECTOMY SIMPLE, SENTINEL NODE, COMBINED Right 02/08/2022    Procedure: right  breast skin sparing mastectomy with right sentinel lymph node biopsy, axillary disection;  Surgeon: Yazmin Krueger MD;  Location: [facility] OR     RECONSTRUCT BREAST, INSERT TISSUE EXPANDER, COMBINED Right 02/08/2022    Procedure: RIGHT FIRST STAGE BREAST RECONSTRUCTION WITH TISSUE EXPANDER,  ACELLULAR DERMAL MATRIX;  Surgeon: Linden Hernandez MD;  Location: [facility] OR     TONSILLECTOMY & ADENOIDECTOMY         Chemotherapy History:  No prior chemotherapy     Radiation History:  No prior RT    Pregnant: No (will confirm with bHCG today prior to CT simulation)  Implanted Cardiac Devices: No    Medications:  Current Outpatient Medications   Medication     acetaminophen (TYLENOL) 500 MG tablet     albuterol (PROAIR HFA/PROVENTIL HFA/VENTOLIN HFA) 108 (90 Base) MCG/ACT inhaler     Ascorbic Acid (VITAMIN C PO)     calcium carbonate (OS-JACKSON 500 MG [place]. [place]) 500 MG tablet     cetirizine (ZYRTEC) 10 MG tablet     citalopram (CELEXA) 40 MG tablet     fluticasone (FLONASE) 50 MCG/ACT nasal spray     Magnesium 400 MG TABS     methocarbamol (ROBAXIN) 750 MG tablet     Multiple Vitamin (MULTI-VITAMIN) per tablet     Omega-3 Fatty Acids (OMEGA-3 FISH OIL PO)     cephALEXin (KEFLEX) 500 MG capsule     oxyCODONE (ROXICODONE) 10 MG tablet     tamoxifen (NOLVADEX) 20 MG tablet     No current facility-administered medications for this visit.       Allergies:   No Known Allergies    Social History:  Tobacco: non smoker  Alcohol: occasional  Employment: working,  at [employer]    Family History:  Family History   Problem Relation Age of Onset     Breast Cancer Mother 56     Cancer Mother         skin     Cancer Father         skin     Cancer Maternal Grandmother         stomach     Cancer Maternal Grandfather         liver     Diabetes Maternal Grandfather      Heart Disease Maternal Grandfather         questionable MI     Colon Cancer Paternal Grandmother      C.A.D. Paternal Grandfather 70        MI     Ovarian Cancer Other          maternal great grandmother     Hypertension No family hx of      Cerebrovascular Disease No family hx of        Review of Systems   A 10-point review of systems was performed. Pertinent findings are noted in the HPI.    Physical Exam   ECOG Status: 0    Vitals:  /75 (BP Location: Left arm)   Pulse 68   Temp 98.8  F (37.1  C) (Oral)   Resp 18   Wt 77.8 kg (171 lb 8 oz)   LMP 02/18/2022   SpO2 100%   BMI 30.38 kg/m      Gen: Alert, in NAD  Head: NC/AT  Eyes: PERRL, EOMI, sclera anicteric  Ears: No external auricular lesions  Nose/sinus: No rhinorrhea or epistaxis  Breast: RIGHT breast incision healing well, incision CDI, no dehiscence, no upper extremity range of motion limitation, tissue expander in place     Oral cavity/oropharynx: MMM, no visible oral cavity lesions  Neck: Full ROM, supple, no palpable adenopathy  Pulm: No wheezing, stridor or respiratory distress  CV: Extremities are warm and well-perfused, no cyanosis, no pedal edema  Abdominal: Normal bowel sounds, soft, nontender, no masses  Musculoskeletal: Normal bulk and tone  Skin: Normal color and turgor  Neuro: A/Ox3, CN II-XII intact, normal gait    Imaging/Path/Labs   Imaging: per HPI, personally reviewed and in agreement     Path: per HPI, personally reviewed and in agreement     Labs: per HPI, personally reviewed and in agreement     Assessment    Ms. Thrasher is 51YF with RIGHT sided ER+/NH+/Her2- kG4P2N2 (Stage IIA) invasive lobular carcinoma status post right skin sparing mastectomy with immediate reconstruction and SLNBx (Dr. Krueger, Dr. Hernandez, 2/8/22) with pathology demonstrating 7.5 cm tumor, 1/7 LN positive (5.5mm, no ABIGAIL), negative margins, no LVSI, pT3N1a. Oncotype is 11 and she will not be receiving chemotherapy, but endocrine therapy (Dr. Huitron).      We reviewed the NCCN Guidelines for invasive breast cancer following total mastectomy with surgical axillary lymph node staging. If four or more axillary nodes  are involved, radiation therapy to the chest wall plus infraclavicular region, supraclavicular area, internal mammary nodes, and any part of the axillary bed at risk is a category 1 recommendation (based on high-level evidence and uniform consensus of the NCCN). In the setting of one to three axillary nodes are involved, the recommendation is to strongly consider radiation to the chest wall plus the regional antonino sites as above. In patients with negative axillary lymph nodes and tumor >5cm, the recommendation is to consider radiation to the chest wall and regional lymph nodes. These recommendations are supported by various literature (Miguel et al, EBCTCG [place]-analysis, Lancet 2005, update Lancet 2014; Overgaard et al., 82b and 82c Combined Analysis, Radiother Oncol 2007).    Thus, given size of breast cancer and lymph node positivity, recommend adjuvant post mastectomy radiation (PMRT).     Plan     1. After extensive discussion, patient wishes to proceed with adjuvant RIGHT post mastectomy radiation.    2. CT simulation today, with plan to start 3/31/22. Plan for 50Gy/25fx to chest wall and regional nodes. bHCG prior to CT simulation was negative.    3. Patient has already met with medical oncology (Dr Huitron) and has a oncotype of 11 and will not receive chemotherapy. Patient will receive endocrine therapy after radiation.    4. She has already had maximal inflation of the right breast expander (Dr. Hernandez), and will be undergoing staged reconstruction 6-12 months after radiation.     All benefits and risks discussed, and patient is in agreement with the oncologic plan discussed above.     Thank you for allowing me to participate in your patient's care.  If you should require any additional information, please do not hesitate in contacting me.         Carlos Alberto Osullivan MD  Department of Radiation Oncology  [facility]         Again, thank you for allowing me to participate in the care of your patient.         Sincerely,        Carlos Alberto Osullivan MD

## 2022-03-17 NOTE — PROGRESS NOTES
Department of Radiation Oncology  [facility]    Health: Cancer Center  [facility]  [address] [place] [id number] [phone]       Consultation Note    Name: Danielle Thrasher MRN: [id number]   : 1970   Date of Service: 3/17/2022  Referring: Dr. Huitron /Dr. Krueger     Reason for consultation: right breast cancer    History of Present Illness   Ms. Thrasher is 51YF with RIGHT sided ER+/OR+/Her2- [id number] (Stage IIA) invasive lobular carcinoma status post right skin sparing mastectomy with immediate reconstruction and SLNBx (Dr. Krueger, Dr. Hernandez, 22) with pathology demonstrating 7.5 cm tumor, 1/7 LN positive (5.5mm, no ABIGAIL), negative margins, no LVSI, pT3N1a. Oncotype is 11 and she will not be receiving chemotherapy, but endocrine therapy (Dr. Huitron).      Briefly, her oncologic history is as follows:     Patient initially underwent a screening mammogram in 2021 demonstrating a asymmetry in the right breast at the 12 o'clock position at middle depth, with the left breast negative for any abnormality.    She underwent a diagnostic mammogram and ultrasound on 2021 demonstrating 2 potential areas of architectural distortion in the retroareolar area.  There was a irregular hypoechoic mass with posterior acoustic shadowing measuring up to 2.5 cm in size with inversion and retraction of the nipple.  There was an adjacent mass 2 cm from the nipple at the 12 o'clock position measuring 2.5 cm in size there is no evidence of any lymphadenopathy.    She underwent biopsy on 2021.  The lesion at the 12 o'clock position, 2 cm from the nipple returned as invasive lobular carcinoma, grade 2, ER positive/OR positive.  Lesion at the 6 o'clock position, 4 cm from the nipple returned as an invasive lobular carcinoma, grade 2 ER positive/OR positive.    She underwent right skin sparing mastectomy with immediate reconstruction  and SLNBx (Dr. Krueger, Dr. Hernandez, 2/8/22) with pathology demonstrating 7.5 cm tumor, 1/7 LN positive (5.5mm, no ABIGAIL), negative margins, no LVSI.     She met with medical oncology  (Dr. Huitron), and Oncotype is 11, andhe will not be receiving chemotherapy, but endocrine therapy after radiation.     She has been undergoing expansion of the right breast tissue expander, with last performed a few days ago with Dr. Hernandez to approximately 325-350 cc. The expanders have been maximally inflated and she will not requiring further expansion.     Today, patient endorses mild right chest wall tenderness from expansion (1/10) which has decreased since last expansion. She denies drainage, fevers, chills, extremity range of motion difficulties, chest pain, dyspnea, or weight loss.     She does have a family history of breast cancer with her mother initially diagnosed in their 50s and subsequently again in the 70s, but genetic testing has been negative. Denies prior RT, no pacemaker, non smoker. She is still pre-menopausal.     Past Medical History:   Past Medical History:   Diagnosis Date     Allergic rhinitis     mold     Major depressive disorder, recurrent episode (H) 2009     Malignant neoplasm of right breast in female, estrogen receptor positive (H) 12/22/2021    Invasive Lobular     Mild persistent asthma      Patellofemoral pain syndrome      Tear meniscus knee 1998    left       Past Surgical History:   Past Surgical History:   Procedure Laterality Date     BREAST BIOPSY, CORE RT/LT Right 12/22/2021     EXCISE GANGLION WRIST      LT     LASIK       MASTECTOMY SIMPLE, SENTINEL NODE, COMBINED Right 02/08/2022    Procedure: right breast skin sparing mastectomy with right sentinel lymph node biopsy, axillary disection;  Surgeon: Yazmin Krueger MD;  Location: [facility] OR     RECONSTRUCT BREAST, INSERT TISSUE EXPANDER, COMBINED Right 02/08/2022    Procedure: RIGHT FIRST STAGE BREAST RECONSTRUCTION WITH TISSUE EXPANDER,   ACELLULAR DERMAL MATRIX;  Surgeon: Linden Hernandez MD;  Location: [facility] OR     TONSILLECTOMY & ADENOIDECTOMY         Chemotherapy History:  No prior chemotherapy     Radiation History:  No prior RT    Pregnant: No (will confirm with [facility] today prior to CT simulation)  Implanted Cardiac Devices: No    Medications:  Current Outpatient Medications   Medication     acetaminophen (TYLENOL) 500 MG tablet     albuterol (PROAIR HFA/PROVENTIL HFA/VENTOLIN HFA) 108 (90 Base) MCG/ACT inhaler     Ascorbic Acid (VITAMIN C PO)     calcium carbonate (OS-JACKSON 500 MG [place]. [place]) 500 MG tablet     cetirizine (ZYRTEC) 10 MG tablet     citalopram (CELEXA) 40 MG tablet     fluticasone (FLONASE) 50 MCG/ACT nasal spray     Magnesium 400 MG TABS     methocarbamol (ROBAXIN) 750 MG tablet     Multiple Vitamin (MULTI-VITAMIN) per tablet     Omega-3 Fatty Acids (OMEGA-3 FISH OIL PO)     cephALEXin (KEFLEX) 500 MG capsule     oxyCODONE (ROXICODONE) 10 MG tablet     tamoxifen (NOLVADEX) 20 MG tablet     No current facility-administered medications for this visit.       Allergies:   No Known Allergies    Social History:  Tobacco: non smoker  Alcohol: occasional  Employment: working,  at [employer]    Family History:  Family History   Problem Relation Age of Onset     Breast Cancer Mother 56     Cancer Mother         skin     Cancer Father         skin     Cancer Maternal Grandmother         stomach     Cancer Maternal Grandfather         liver     Diabetes Maternal Grandfather      Heart Disease Maternal Grandfather         questionable MI     Colon Cancer Paternal Grandmother      C.A.D. Paternal Grandfather 70        MI     Ovarian Cancer Other         maternal great grandmother     Hypertension No family hx of      Cerebrovascular Disease No family hx of        Review of Systems   A 10-point review of systems was performed. Pertinent findings are noted in the HPI.    Physical Exam   ECOG Status: 0    Vitals:  /75 (BP  Location: Left arm)   Pulse 68   Temp 98.8  F (37.1  C) (Oral)   Resp 18   Wt 77.8 kg (171 lb 8 oz)   LMP 02/18/2022   SpO2 100%   BMI 30.38 kg/m      Gen: Alert, in NAD  Head: NC/AT  Eyes: PERRL, EOMI, sclera anicteric  Ears: No external auricular lesions  Nose/sinus: No rhinorrhea or epistaxis  Breast: RIGHT breast incision healing well, incision CDI, no dehiscence, no upper extremity range of motion limitation, tissue expander in place     Oral cavity/oropharynx: MMM, no visible oral cavity lesions  Neck: Full ROM, supple, no palpable adenopathy  Pulm: No wheezing, stridor or respiratory distress  CV: Extremities are warm and well-perfused, no cyanosis, no pedal edema  Abdominal: Normal bowel sounds, soft, nontender, no masses  Musculoskeletal: Normal bulk and tone  Skin: Normal color and turgor  Neuro: A/Ox3, CN II-XII intact, normal gait    Imaging/Path/Labs   Imaging: per HPI, personally reviewed and in agreement     Path: per HPI, personally reviewed and in agreement     Labs: per HPI, personally reviewed and in agreement     Assessment    Ms. Thrasher is 51YF with RIGHT sided ER+/VA+/Her2- [id number] (Stage IIA) invasive lobular carcinoma status post right skin sparing mastectomy with immediate reconstruction and SLNBx (Dr. Krueger, Dr. Hernandez, 2/8/22) with pathology demonstrating 7.5 cm tumor, 1/7 LN positive (5.5mm, no ABIGAIL), negative margins, no LVSI, pT3N1a. Oncotype is 11 and she will not be receiving chemotherapy, but endocrine therapy (Dr. Huitron).      We reviewed the NCCN Guidelines for invasive breast cancer following total mastectomy with surgical axillary lymph node staging. If four or more axillary nodes are involved, radiation therapy to the chest wall plus infraclavicular region, supraclavicular area, internal mammary nodes, and any part of the axillary bed at risk is a category 1 recommendation (based on high-level evidence and uniform consensus of the NCCN). In the setting of one  to three axillary nodes are involved, the recommendation is to strongly consider radiation to the chest wall plus the regional antonino sites as above. In patients with negative axillary lymph nodes and tumor >5cm, the recommendation is to consider radiation to the chest wall and regional lymph nodes. These recommendations are supported by various literature (Miguel et al, EBCTCG [place]-analysis, Lancet 2005, update Lancet 2014; Overgaard et al., 82b and 82c Combined Analysis, Radiother Oncol 2007).    Thus, given size of breast cancer and lymph node positivity, recommend adjuvant post mastectomy radiation (PMRT).     Plan     1. After extensive discussion, patient wishes to proceed with adjuvant RIGHT post mastectomy radiation.    2. CT simulation today, with plan to start 3/31/22. Plan for 50Gy/25fx to chest wall and regional nodes. bHCG prior to CT simulation was negative.    3. Patient has already met with medical oncology (Dr Huitron) and has a oncotype of 11 and will not receive chemotherapy. Patient will receive endocrine therapy after radiation.    4. She has already had maximal inflation of the right breast expander (Dr. Hernandez), and will be undergoing staged reconstruction 6-12 months after radiation.     All benefits and risks discussed, and patient is in agreement with the oncologic plan discussed above.     Thank you for allowing me to participate in your patient's care.  If you should require any additional information, please do not hesitate in contacting me.         Carlos Alberto Osullivan MD  Department of Radiation Oncology  [facility]

## 2022-03-21 ENCOUNTER — VIRTUAL VISIT (OUTPATIENT)
Dept: ONCOLOGY | Facility: CLINIC | Age: 52
End: 2022-03-21
Attending: SURGERY
Payer: COMMERCIAL

## 2022-03-21 DIAGNOSIS — Z80.3 FAMILY HISTORY OF MALIGNANT NEOPLASM OF BREAST: ICD-10-CM

## 2022-03-21 DIAGNOSIS — C50.511 MALIGNANT NEOPLASM OF LOWER-OUTER QUADRANT OF RIGHT BREAST OF FEMALE, ESTROGEN RECEPTOR POSITIVE (H): Primary | ICD-10-CM

## 2022-03-21 DIAGNOSIS — Z17.0 MALIGNANT NEOPLASM OF LOWER-OUTER QUADRANT OF RIGHT BREAST OF FEMALE, ESTROGEN RECEPTOR POSITIVE (H): Primary | ICD-10-CM

## 2022-03-21 DIAGNOSIS — Z80.0 FAMILY HISTORY OF MALIGNANT NEOPLASM OF COLON: ICD-10-CM

## 2022-03-21 PROCEDURE — 96040 HC GENETIC COUNSELING, EACH 30 MINUTES: CPT | Mod: GT,95 | Performed by: GENETIC COUNSELOR, MS

## 2022-03-21 NOTE — PATIENT INSTRUCTIONS
Assessing Cancer Risk  Only about 5-10% of cancers are thought to be due to an inherited cancer susceptibility gene.    These families often have:    Several people with the same or related types of cancer    Cancers diagnosed at a young age (before age 50)    Individuals with more than one primary cancer    Multiple generations of the family affected with cancer    Some people may be candidates for genetic testing of more than one gene.  For these families, genetic testing using a cancer panel may be offered.  These panels will test different genes known to increase the risk for breast, ovarian, uterine, and/or other cancers. All of the genes discussed below have published clinical management guidelines for individuals who are found to carry a mutation. The purpose of this handout is to serve as a brief summary of the genes analyzed by the panels used to inquire about hereditary breast and gynecologic cancer:  APC, GORDON, BMPR1A, BRCA1, BRCA2, BRIP1, CDH1, CHEK2, EPCAM, GREM1, MLH1, MSH2, MSH6, MUTYH, PMS2, POLD1, POLE, PTEN, PALB2, RAD51C, RAD51D, SMAD4, STK11, and TP53.   ______________________________________________________________________________  Hereditary Breast and Ovarian Cancer Syndrome   (BRCA1 and BRCA2)  A single mutation in one of the copies of BRCA1 or BRCA2 increases the risk for breast and ovarian cancer, among others.  The risk for pancreatic cancer and melanoma may also be slightly increased in some families.  The chart below shows the chance that someone with a BRCA mutation would develop cancer in his or her lifetime1,2,3,4.        A person s ethnic background is also important to consider, as individuals of Ashkenazi [place] ancestry have a higher chance of having a BRCA gene mutation.  There are three BRCA mutations that occur more frequently in this population.    Campa Syndrome   (MLH1, MSH2, MSH6, PMS2, and EPCAM)  Currently five genes are known to cause Campa Syndrome: MLH1, MSH2, MSH6,  PMS2, and EPCAM.  A single mutation in one of the Campa Syndrome genes increases the risk for colon, endometrial, ovarian, and stomach cancers.  Other cancers that occur less commonly in Campa Syndrome include urinary tract, skin, and brain cancers.  The chart below shows the chance that a person with Campa syndrome would develop cancer in his or her lifetime5.      *Cancer risk varies depending on Campa syndrome gene found    Cowden Syndrome   (PTEN)  Cowden syndrome is a hereditary condition that increases the risk for breast, thyroid, endometrial, colon, and kidney cancer.  Cowden syndrome is caused by a mutation in the PTEN gene.  A single mutation in one of the copies of PTEN causes Cowden syndrome and increases cancer risk.  The chart below shows the chance that someone with a PTEN mutation would develop cancer in their lifetime6,7.  Other benign features seen in some individuals with Cowden syndrome include benign skin lesions (facial papules, keratoses, lipomas), learning disability, autism, thyroid nodules, colon polyps, and larger head size.      *One recent study found breast cancer risk to be increased to 85%    Li-Fraumeni Syndrome   (TP53)  Li-Fraumeni Syndrome (LFS) is a cancer predisposition syndrome caused by a mutation in the TP53 gene. A single mutation in one of the copies of TP53 increases the risk for multiple cancers. Individuals with LFS are at an increased risk for developing cancer at a young age. The lifetime risk for development of a LFS-associated cancer is 50% by age 30 and 90% by age 60.   Core Cancers: Sarcomas, Breast, Brain, Lung, Leukemias/Lymphomas, Adrenocortical carcinomas  Other Cancers: Gastrointestinal, Thyroid, Skin, Genitourinary    Hereditary Diffuse Gastric Cancer   (CDH1)  Currently, one gene is known to cause hereditary diffuse gastric cancer (HDGC): CDH1.  Individuals with HDGC are at increased risk for diffuse gastric cancer and lobular breast cancer. Of people  diagnosed with HDGC, 30-50% have a mutation in the CDH1 gene.  This suggests there are likely other genes that may cause HDGC that have not been identified yet.      Lifetime Cancer Risks    General Population HDGC    Diffuse Gastric  <1% ~80%   Breast 12% 39-52%       Familial Adenomatous Polyposis (FAP)  FAP is a hereditary cancer syndrome caused by mutations in the APC gene. The condition is known to cause hundreds to thousands of adenomatous polyps in the colon, creating a  carpet  of polyps. Some individuals have what is called attenuated FAP (AFAP), a milder form of FAP with fewer polyps and typically later onset. Individuals with an APC gene mutation are at an increased risk for colon, thyroid, and duodenal cancers, as well as several other types of cancer1.  Other features of this condition may include: osteomas, dental anomalies, benign skin lesions, CHRPE ( freckle  on the inside of the eye), and desmoid tumors.      Lifetime Cancer Risks     Cancer Type General Population FAP   Colon  5% near 100%   Thyroid (papillary) 1% 1-12%   Duodenal <1% 5%   Liver  <1% 1-2% before age 5   Pancreas <1% 1%   Stomach <1% 1%       Juvenile Polyposis Syndrome (JPS)  JPS is characterized by hamartomatous polyps, called juvenile polyps, in the gastrointestinal tract.  Juvenile  refers to the type of polyps seen in this hereditary cancer condition, not the age of onset. Currently, mutations in two genes are known to cause JPS: BMPR1A and SMAD4. Of individuals clinically diagnosed with JPS, only 40% have an identifiable mutation in one of these genes, suggesting there are other genes that cause JPS that have not been discovered yet. Individuals with JPS are at an increased risk for colon cancer and stomach cancer 2,3,4. Pancreatic and small bowel cancers have also been reported in JPS, but the actual risks are unknown.         Lifetime Cancer Risks    Cancer Type General Population JPS   Colon 5% 40-50%   Gastric/Duodenal  <1% 10-21%     Some individuals with SMAD4 mutations have a condition called JPS/HHT (Juvenile Polyposis/Hereditary Hemorrhagic Telangiectasia) where in addition to JPS, individuals may have nose bleeds and clotting issues.       MUTYH-Associated Polyposis (MAP)  MAP is a hereditary cancer syndrome caused by mutations in the MUTYH gene. Unlike the other hereditary cancer genes discussed in this handout, two mutations in the MUTYH gene cause MAP and increase cancer risk. Those affected with MAP typically have between  adenomatous polyps. This syndrome also increases the risk for colon and duodenal cancer. Current research suggests that other cancers may be associated with MUTYH mutations, as well. The table below includes the risk that someone with two MUTYH gene mutations would develop cancer in their lifetime; of note, there is also an increased colon cancer risk for individuals who carry only one MUTYH gene mutation5,6,7.       Lifetime Cancer Risks    Cancer Type General Population MAP   Colon 5% %   Duodenal  <1% 5%       Peutz-Jeghers syndrome (PJS)  PJS is a hereditary cancer syndrome caused by mutations in the STK11 gene. This condition can be distinguished from other hereditary syndromes by the presence of hamartomatous polyps in the gastrointestinal tract and freckles present in unusual places such as the hands, feet, neck, and lips. Individuals with Peutz-Jeghers syndrome have an increased risk for colon, breast, pancreatic, and other cancers3.  Men are at risk for testicular tumors which can affect hormones in the body. Women are at risk for sex cord tumors of the ovaries and a rare aggressive type of cervical cancer.     Lifetime Cancer Risks    Cancer Type General Population PJS   Breast 12% 45-50%   Colon 5% 39%   Stomach <1% 29%   Pancreas 1.5% 11-36%   Small Intestine <1% 13%     Ovarian  2%  18%   Lung 6-7% 15-17%     Additional Genes Associated with Increased Colon Cancer  Risk  CHEK2  CHEK2 is a moderate-risk breast cancer gene.  Women who have a mutation in CHEK2 have around a 2-fold increased risk for breast cancer compared to the general population, and this risk may be higher depending upon family history.12,13,14.  Mutations in CHEK2 have also been shown to increase the risk of a number of other cancers, including colon and prostate, however these cancer risks are currently not well understood.     GORDON  GORDON is a moderate-risk breast cancer gene. Women who have a mutation in GORDON can have between a 2-4 fold increased risk for breast cancer compared to the general population8. GORDON mutations have also been associated with increased risk for pancreatic cancer, however an estimate of this cancer risk is not well understood9. Individuals who inherit two GORDON mutations have a condition called ataxia-telangiectasia (AT).  This rare autosomal recessive condition affects the nervous system and immune system, and is associated with progressive cerebellar ataxia beginning in childhood.  Individuals with ataxia-telangiectasia often have a weakened immune system and have an increased risk for childhood cancers.    PALB2  Mutations in PALB2 have been shown to increase the risk of breast cancer up to 33-58% in some families; where individuals fall within this risk range is dependent upon family [id number]. PALB2 mutations have also been associated with increased risk for pancreatic cancer, although this risk has not been quantified yet.  Individuals who inherit two PALB2 mutations--one from their mother and one from their father--have a condition called Fanconi Anemia.  This rare autosomal recessive condition is associated with short stature, developmental delay, bone marrow failure, and increased risk for childhood cancers.    GREM1  GREM1 is a moderate-risk colon polyposis gene. Duplications of this gene are more commonly found in individuals with Ashkenazi Scientologist [id number]. Mutations in GREM1  are associated with colon polyps and therefore an increased risk of colon cancer; however the estimated cancer risk is not well [id number].     POLD1 and POLE  POLD1 and POLE are moderate-risk colon cancer genes. Carriers of a mutation in one of these genes increases the lifetime risk of colorectal kamluz28,18,19,20. Mutations in these genes may also be associated with increased risk for other cancers including: endometrial cancer, duodenal adenomas and carcinomas, and brain tumors.    BRIP1, RAD51C and RAD51D  Mutations in BRIP1, RAD51C, and RAD51D have been shown to increase the risk of ovarian cancer and possibly female breast cancer as well14,15 .       Lifetime Cancer Risk    General Population BRIP1 RAD51C RAD51D   Ovarian 1-2% ~5-8% ~5-9% ~7-15%       Inheritance  All of the cancer syndromes reviewed above are inherited in an autosomal dominant pattern.  This means that if a parent has a mutation, each of his or her children will have a 50% chance of inheriting that same mutation.  Therefore, each child--male or female--would have a 50% chance of being at increased risk for developing cancer.      Image obtained from Genetics Home Reference, 2013     Mutations in some genes can occur de saranya, which means that a person s mutation occurred for the first time in them and was not inherited from a parent.  Now that they have the mutation, however, it can be passed on to future generations.    Genetic Testing  Genetic testing involves a blood test and will look at the genetic information in the APC, GORDON, BMPR1A, BRCA1, BRCA2, BRIP1, CDH1, CHEK2, EPCAM, GREM1, MLH1, MSH2, MSH6, MUTYH, PMS2, POLD1, POLE, PTEN, PALB2, RAD51C, RAD51D, SMAD4, STK11, and TP53.genes for any harmful mutations that are associated with increased cancer risk.  If possible, it is recommended that the person(s) who has had cancer be tested before other family members.  That person will give us the most useful information about whether or not  a specific gene is associated with the cancer in the family.    Results  There are three possible results of genetic testing:    Positive--a harmful mutation was identified in one or more of the genes    Negative--no mutation was identified in any of the genes on this panel    Variant of unknown significance--a variation in one of the genes was identified, but it is unclear how this impacts cancer risk in the family    Advantages and Disadvantages   There are advantages and disadvantages to genetic testing.    Advantages    May clarify your cancer risk    Can help you make medical decisions    May explain the cancers in your family    May give useful information to your family members (if you share your results)    Disadvantages    Possible negative emotional impact of learning about inherited cancer risk    Uncertainty in interpreting a negative test result in some situations    Possible genetic discrimination concerns (see below)    Genetic Information Nondiscrimination Act (JENNIFER)  JENNIFER is a federal law that protects individuals from health insurance or employment discrimination based on a genetic test result alone.  Although rare, there are currently no legal discrimination protections in terms of life insurance, long term care, or disability insurances.  Visit the National Human Genome Research [place] website to learn more.    Reducing Cancer Risk  All of the genes described above have nationally recognized cancer screening guidelines that would be recommended for individuals who test positive.  In addition to increased cancer screening, surgeries may be offered or recommended to reduce cancer risk.  Recommendations are based upon an individual s genetic test result as well as their personal and family history of cancer.    Questions to Think About Regarding Genetic Testing:    What effect will the test result have on me and my relationship with my family members if I have an inherited gene mutation?  If I  don t have a gene mutation?    Should I share my test results, and how will my family react to this news, which may also affect them?    Are my children ready to learn new information that may one day affect their own health?    Hereditary Cancer Resources    FORCE: Facing Our Risk of Cancer Empowered facingourrisk.org   Bright Pink bebrightpink.org   Li-Fraumeni Syndrome Association lfsassociation.org   PTEN World PTENworld.Lytics   No stomach for cancer, Inc. nostomachforcancer.org   Stomach cancer relief network Scrnet.org   Collaborative Group of the Americas on Inherited Colorectal Cancer (CGA) cgaicc.com    Cancer Care cancercare.org   American Cancer Society (ACS) cancer.org   National Cancer [place] (NCI) cancer.gov     Please call us if you have any questions or concerns.   Cancer Risk Management Program [phone]-CANCER ([phone])  ? Sky Davalos, MS, [facility]  [phone]  ? Bette Wright, MS, [facility]  [phone]  ? Shyanne Martin, MS, [facility]  [phone]  ? Hetal Pablo, MS, [facility]  [phone]  ? Svetlana Jarrett, MS, [facility]  [phone]  ? Gregorio Bennett, MS, [facility]  [phone]  ? Ashley Limon, MS, [facility]  [phone]    References  1. Caron HEREDIA, Cristino PDP, Sujey S, Orly FISHER, Charlene JE, Leyla JL, Krystal N, Mikala H, Vini O, Emory A, Antonia B, Simone P, Manmatty S, Francheska DM, Tom N, Justin E, Rogelio H, Giovanni E, Kobeinski J, Gronwald J, Mehdi B, Isela H, Christianlaci S, Nickyrola H, Tommy H, Irma K, Bar OP. Average risks of breast and ovarian cancer associated with BRCA1 or BRCA2 mutations detected in case series unselected for family history: a combined analysis of 222 studies. Am J Hum Adriana. 2003;72:1117-30.  2. Jeanette BRIGGS, Ana Maria CRESPO, Mary G.  BRCA1 and BRCA2 Hereditary Breast and Ovarian Cancer. Gene Reviews online. 2013.  3. Lauri YC, Ezra S, Sabrina G, Lee S. Breast cancer risk among male BRCA1 and BRCA2 mutation carriers. J Natl Cancer Inst. 2007;99:1811-4.  4. Jose LEIGH,  Moe I, Blade J, Elodia E, Ramona ER, Samia F. Risk of breast cancer in male BRCA2 carriers. J Med Adriana. 2010;47:710-1.  5. National Comprehensive Cancer Network. Clinical practice guidelines in oncology, colorectal cancer screening. Available online (registration required). 2015.  6. Duke MH, Janae J, Nahid J, Branden LA, Merary MS, Naomy C. Lifetime cancer risks in individuals with germline PTEN mutations. Clin Cancer Res. 2012;18:400-7.  7. Rosina LOPEZ. Cowden Syndrome: A Critical Review of the Clinical Literature. J Adriana . 2009:18:13-27.  8. Osiel A, Shukri D, Quinten S, Kamilla P, Cindi T, Barry M, Gregory B, Amy H, Ronny R, Rom K, Anselmo L, Jose DG, Francheska D, Dhiraj DF, Omar MR, The Breast Cancer Susceptibility Collaboration ([place]) & Netta BRIGGS. GORDON mutations that cause ataxia-telangiectasia are breast cancer susceptibility alleles. Nature Genetics. 2006;38:873-875  9. Sarthak N , Jules Y, Lamar J, Brenna L, Arreguin GM , Deanna ML, Gallinger S, Angel AG, Syngal S, Deepa ML, Radha J , Brianna R, Young SZ, Waleska JR, Jaelyn VE, Suly M, Vogelstein B, Lexx N, Marc RH, Ej KW, and Huffman AP. GORDON mutations in patients with hereditary pancreatic cancer. Cancer Discover. 2012;2:41-46  10. Caron QUIROS, et al. Breast-Cancer Risk in Families with Mutations in PALB2. NEJM. 2014; 371(6):497-506.  11. CHEK2 Breast Cancer Case-Control Consortium. CHEK2*1100delC and susceptibility to breast cancer: A collaborative analysis involving 10,860 breast cancer cases and 9,065 controls from 10 studies. Am J Hum Adriana, 74 (2004), pp. 4687-6996  12. Courtney T, Liliane S, Villa K, et al. Spectrum of Mutations in BRCA1, BRCA2, CHEK2, and TP53 in Families at High Risk of Breast Cancer. SIDDHARTH. 2006;295(12):8650-1750.   13. Tracy MOLINA, Abisai ZAIDI, Krissy HEREDIA, et al. Risk of breast cancer in women with a CHEK2 mutation with and without a family history of breast cancer. J Clin  Oncol. 2011;29:6804-6960.  14. Pierce H, Carl E, Juanita SJ, et al. Contribution of germline mutations in the RAD51B, RAD51C, and RAD51D genes to ovarian cancer in the population. J Clin Oncol. 2015;33(26):7029-6765. Doi:10.1200/JCO.2015.61.2408.  15. Vinnie T, Kali DF, Sylvia P, et al. Mutations in BRIP1 confer high risk of ovarian cancer. Theresa Adriana. 2011;43(11):0774-2945. doi:10.1038/ng.955.  16. Gayatri E, Mary J, Martin G, Bharath-Eduin E, Razia J, et al. The Prevalence of thyroid cancer and benign thyroid disease in patients with familial adenomatous polyposis may be higher than previously recognized. Clin Colorectal Cancer. 2012;11:304-308.  17. Frances L, Van Bev A, Diane L, Tavia C, Carlos K, et al. Risk of colorectal cancer in juvenile polyposis. Gut. 2007;56:965-967.  18. Chavira FG, Callum MN, Farhan CA. Colorectal cancer risk in hamartomatous polyposis syndromes. World Journal of Gastrointestinal Surgery. 2015;27:25-32  19. Angelica MG. Guidance on gastrointestinal surveillance for hereditary non-polyposis colorectal cancer, familial adenomatous polypolis, juvenile polyposis, and Peutz-Jeghers syndrome. Gut. 2002;51:21-27.  20. Sky AK, Hair PARTHA, Faith APPLEG et al. Risk of extracolonic cancers for people with biallelic and monoallelic mutations in MUTYH. Int J of Cancer. 2016;139:0851-5348.  21. Fifi S, Marciano S, Goallison H, Eulalio K, Mcneil M, et al. MUTYH-associated polyposis: 70 of 71 patients with biallelic mutations present with an attenuated or atypical phenotype. Int J of Cancer. 2006;119:807-814.  22. Ana Maria SANTOS, Cl F, Chilo I, Fabio CRESPO, Ana Maria BUTLER, et al. MUTYH mutation carriers have increased breast cancer risk. Cancer. 2012;7099-3276.  23. Duke ROJAS, Janae J, Nahid J, Branden LA, Merary MS, Eng C. Lifetime cancer risks in individuals with germline PTEN mutations. Clin Cancer Res. 2012;18:400-7.  24. Rosina LOPEZ. Cowden Syndrome: A Critical Review of the Clinical  Literature. J Adriana . 2009:18:13-27.  25. National Comprehensive Cancer Network. Clinical practice guidelines in oncology, colorectal cancer screening. Available online (registration required). 2013.  26. National Cancer [place]. SEER Cancer Stat Fact Sheets.  December 2013.  27. CHEK2 Breast Cancer Case-Control Consortium. CHEK2*1100delC and susceptibility to breast cancer: A collaborative analysis involving 10,860 breast cancer cases and 9,065 controls from 10 studies. Am J Hum Adriana, 74 (2004), pp. 2215-7253  28. Courtney T, Liliane S, Villa K, et al. Spectrum of Mutations in BRCA1, BRCA2, CHEK2, and TP53 in Families at High Risk of Breast Cancer. SIDDHARTH. 2006;295(12):0224-2308.  29. Tracy MOLINA, Abisai D, Krissy HEREDIA, et al. Risk of breast cancer in women with a CHEK2 mutation with and without a family history of breast cancer. J Clin Oncol. 2011;29:8929-0923.  30. Cara APPLE, Trent E, Femi J, Ghada N, Milka M et al. Defining the polyposis/colorectal cancer phenotype associated with the GREM1 duplication: counseling and management guidelines. Adriana .Res. 2016;98:1-5.  31. Abrahan ESTRELLA, Collin S, Peter A, Christoph Rodgers, et al. Hereditary mixed polyposis syndrome is caused by a 40kb upstream duplication that leads to increased and ectopic expression of the BMP antagonist GREM1. Theresa Adriana. 2015;44:699-703.  32. JESSE Go. et al. Germline mutations affecting the proofreading domains of POLE and POLD1 predispose to colorectal adenomas and carcinomas. Theresa. Adriana. 45, 136-44 (2013).  33. LYNN Torres. et al. POLE and POLD1 mutations in 89 jose ramon with familial colorectal cancer and/or polyposis: review of reported cases and recommendations for genetic testing and surveillance. Adriana. Med. (2015). doi:10.1038/gim.2015.75  34. AMENA Montez et al. New insights into POLE and POLD1 germline mutations in familial colorectal cancer and polyposis. Hum. Mol. Adriana. 23, 9936-26 (2014).  35. Spier, I. et al. Frequency and  phenotypic spectrum of germline mutations in POLE and seven other polymerase genes in 266 patients with colorectal adenomas and carcinomas. Int. J. Cancer 137, 320-31 (2015).

## 2022-03-21 NOTE — PROGRESS NOTES
3/21/2022    Referring Provider: Yazmin Krueger MD    Presenting Information:   I met with Danielle for her video genetic counseling visit, through the Cancer Risk Management Program, to discuss her personal history of breast cancer and family history of breast and colon cancer. Today we reviewed this history, cancer screening recommendations, and available genetic testing options.    Personal History:  Danielle is a 51 year old year old female. She was diagnosed with invasive lobular carcinoma (ILC) in her right breast at age 51 (ER/NH positive, HER2 negative); treatment has so far included right mastectomy. She had negative genetic testing for 11 genes through the Breast Actionable panel at the [facility]: GORDON, BARD1, BRCA1, BRCA2, CDH1, CHEK2, NF1, PALB2, PTEN, STK11, and TP53.    She had her first menstrual period at age 12, she does not have children, and is premenopausal. Danielle has her ovaries, fallopian tubes and uterus in place, and she has had no ovarian cancer screening to date. She reports that she has not used hormone replacement therapy.      She has annual clinical breast exams and mammograms; her most recent mammogram in 2021 found an irregular hypoechoic mass that was identified to be ILC. Danielle has not had a colonoscopy. She had normal Cologuard testing May 2021. She does not regularly do any other cancer screening at this time.     Family History: (Please see scanned pedigree for detailed family history information)    Danielle's mother was diagnosed with breast cancer at age 52. She was later diagnosed with a new primary breast cancer at age 70.    Danielle's maternal grandmother was diagnosed with and  from an unknown cancer in her 80's.    Danielle's paternal grandmother was diagnosed with colon cancer in her 70's and  in her 80's.    Her maternal and paternal ethnicity is [place]. There is no known Ashkenazi Muslim ancestry on  either side of her family. There is no reported consanguinity.    Discussion:    Danielle's personal and family history of breast and colon cancer is suggestive of a hereditary cancer syndrome.    We reviewed the features of sporadic, familial, and hereditary cancers. We discussed that mutations in either BRCA1 or BRCA2 could be possible hereditary explanations for her family history of cancer. Mutations in the BRCA1 or BRCA2 gene are known to cause Hereditary Breast and Ovarian Cancer Syndrome (HBOC). HBOC typically presents with multiple family members diagnosed with breast cancer before age 50 and/or ovarian cancer. Other cancer risks associated with HBOC include male breast cancer, prostate cancer, pancreatic cancer, and melanoma.   We reviewed the BRCA1/2 genes, for which Danielle had previous negative genetic testing. We discussed that Danielle had a limited panel that did not test for all of the hereditary causes for breast cancer or colon cancer. Testing is also available for additional cancer risk genes via panel testing.     Based on her personal and family history, Danielle meets current National Comprehensive Cancer Network (NCCN) criteria for genetic testing of BRCA1/2 along with other high-penetrance breast and/or ovarian cancer susceptibility genes (I.e. CDH1, PALB2, PTEN, and TP53). NCCN guidelines also mention that there may be a role for multi-gene panel genetic testing for patients who have previously tested negative for a single hereditary cancer syndrome, but have a suspicious personal or family history.    We discussed the natural history and genetics of hereditary cancer. A detailed handout regarding hereditary cancer, along with the other information we discussed, will be mailed to Danielle at the end of our appointment today and can be found in the after visit summary. Topics included: inheritance pattern, cancer risks, cancer screening recommendations, and also risks, benefits and limitations of  testing.    We discussed that there are additional genes that could cause increased risk for breast and/or colon cancer. As many of these genes present with overlapping features in a family and accurate cancer risk cannot always be established based upon the pedigree analysis alone, it would be reasonable for Danielle to consider panel genetic testing to analyze multiple genes at once.  Genetic testing is available for 40 genes associated with hereditary cancer: Comprehensive 40 (APC, GORDON, AXIN2, BAP1, BARD1, BMPR1A, BRCA1, BRCA2, BRIP1, CDH1, CDK4, CDKN2A, CHEK2, DICER1, EPCAM, GREM1, HOXB13, MITF, MLH1, MRE11A, MSH2, MSH3, MSH6, MUTYH, NBN, NF1, NTHL1, PALB2, PMS2, POLD1, POLE, POT1, PTEN, RAD50, RAD51C, RAD51D, SMAD4, SMARCA4, STK11, and TP53).  We discussed that many of the genes in the Comprehensive 40 panel are associated with specific hereditary cancer syndromes and published management guidelines: Hereditary Breast and Ovarian Cancer syndrome (BRCA1, BRCA2), Campa syndrome (MLH1, MSH2, MSH6, PMS2, EPCAM), Familial Adenomatous Polyposis (APC), Hereditary Diffuse Gastric Cancer (CDH1), Familial Atypical Multiple Mole Melanoma syndrome (CDK4, CDKN2A), Juvenile Polyposis syndrome (BMPR1A, SMAD4), Cowden syndrome (PTEN), Li Fraumeni syndrome (TP53), Peutz-Jeghers syndrome (STK11), MUTYH Associated Polyposis (MUTYH), and Neurofibromatosis type 1 (NF1).   The GORDON, AXIN2, BRIP1, CHEK2, GREM1, MSH3, NBN, NTHL1, PALB2, POLD1, POLE, RAD51C, and RAD51D genes are associated with increased cancer risk and have published management guidelines for certain cancers.    The remaining genes (BAP1, BARD1, DICER1, HOXB13, MITF, MRE11A, POT1, RAD50, and SMARCA4) are associated with increased cancer risk and may allow us to make medical recommendations when mutations are identified.      We discussed that Danielle's sample is already in the lab, which will be used for her genetic testing.    Verbal consent was given over video and  written on the consent form. Turnaround time is approximately 4 weeks.    Medical Management: For Danielle, we reviewed that the information from genetic testing may determine:    additional cancer screening for which Danielle may qualify (i.e. mammogram and breast MRI, more frequent colonoscopies, more frequent dermatologic exams, etc.),    options for risk reducing surgeries Danielle could consider (i.e. surgery to remove her other breast, surgery to remove her ovaries and/or uterus, etc.),      and targeted chemotherapies if she were to develop certain cancers in the future (i.e. immunotherapy for individuals with Campa syndrome, PARP inhibitors, etc.).     These recommendations and possible targeted chemotherapies will be discussed in detail once genetic testing is completed.     Plan:  1) Today Danielle elected to proceed with Comprehensive 40 panel through MDL.  2) A copy of the consent form and the after visit summary will be mailed to Danielle.  3) This information should be available in approximately 4 weeks, once the lab receives the sample.  4) I will call Danielle with the results once they become available.    Time spent on video: 30 minutes    Sky Davalos MS, [facility]  Licensed, Certified Genetic Counselor  Phone: [phone]

## 2022-03-21 NOTE — LETTER
Cancer Risk Management  Program Locations    [facility]  [facility]  [facility]  Mailing Address  Cancer Risk Management Program  [facility]  [address]  [place] [address]    New patient appointments  [phone]  March 21, 2022    Danielle Thrasher  [address] [facility] [address]    Dear Danielle,    It was a pleasure speaking with you over video on 3/21/2022. Here is a copy of the progress note from our discussion. If you have any additional questions, please feel free to call.    Referring Provider: Yazmin Krueger MD    Presenting Information:   I met with Danielle for her video genetic counseling visit, through the Cancer Risk Management Program, to discuss her personal history of breast cancer and family history of breast and colon cancer. Today we reviewed this history, cancer screening recommendations, and available genetic testing options.    Personal History:  Danielle is a 51 year old year old female. She was diagnosed with invasive lobular carcinoma (ILC) in her right breast at age 51 (ER/WI positive, HER2 negative); treatment has so far included right mastectomy. She had negative genetic testing for 11 genes through the Breast Actionable panel at the [facility] ([facility] at [facility]: GORDON, BARD1, BRCA1, BRCA2, CDH1, CHEK2, NF1, PALB2, PTEN, STK11, and TP53.    She had her first menstrual period at age 12, she does not have children, and is premenopausal. Danielle has her ovaries, fallopian tubes and uterus in place, and she has had no ovarian cancer screening to date. She reports that she has not used hormone replacement therapy.      She has annual clinical breast exams and mammograms; her most recent mammogram in November 2021 found an irregular hypoechoic mass that was  identified to be ILC. Danielle has not had a colonoscopy. She had normal Cologuard testing May 2021. She does not regularly do any other cancer screening at this time.     Family History: (Please see scanned pedigree for detailed family history information)    Danielle's mother was diagnosed with breast cancer at age 52. She was later diagnosed with a new primary breast cancer at age 70.    Danielle's maternal grandmother was diagnosed with and  from an unknown cancer in her 80's.    Danielle's paternal grandmother was diagnosed with colon cancer in her 70's and  in her 80's.    Her maternal and paternal ethnicity is Romanian. There is no known Ashkenazi Jainism ancestry on either side of her family. There is no reported consanguinity.    Discussion:    Danielle's personal and family history of breast and colon cancer is suggestive of a hereditary cancer syndrome.    We reviewed the features of sporadic, familial, and hereditary cancers. We discussed that mutations in either BRCA1 or BRCA2 could be possible hereditary explanations for her family history of cancer. Mutations in the BRCA1 or BRCA2 gene are known to cause Hereditary Breast and Ovarian Cancer Syndrome (HBOC). HBOC typically presents with multiple family members diagnosed with breast cancer before age 50 and/or ovarian cancer. Other cancer risks associated with HBOC include male breast cancer, prostate cancer, pancreatic cancer, and melanoma.   We reviewed the BRCA1/2 genes, for which Danielle had previous negative genetic testing. We discussed that Danielle had a limited panel that did not test for all of the hereditary causes for breast cancer or colon cancer. Testing is also available for additional cancer risk genes via panel testing.     Based on her personal and family history, Danielle meets current National Comprehensive Cancer Network (NCCN) criteria for genetic testing of BRCA1/2 along with other high-penetrance breast and/or ovarian cancer  susceptibility genes (I.e. CDH1, PALB2, PTEN, and TP53). NCCN guidelines also mention that there may be a role for multi-gene panel genetic testing for patients who have previously tested negative for a single hereditary cancer syndrome, but have a suspicious personal or family history.    We discussed the natural history and genetics of hereditary cancer. A detailed handout regarding hereditary cancer, along with the other information we discussed, will be mailed to Danielle at the end of our appointment today and can be found in the after visit summary. Topics included: inheritance pattern, cancer risks, cancer screening recommendations, and also risks, benefits and limitations of testing.    We discussed that there are additional genes that could cause increased risk for breast and/or colon cancer. As many of these genes present with overlapping features in a family and accurate cancer risk cannot always be established based upon the pedigree analysis alone, it would be reasonable for Danielle to consider panel genetic testing to analyze multiple genes at once.  Genetic testing is available for 40 genes associated with hereditary cancer: Comprehensive 40 (APC, GORDON, AXIN2, BAP1, BARD1, BMPR1A, BRCA1, BRCA2, BRIP1, CDH1, CDK4, CDKN2A, CHEK2, DICER1, EPCAM, GREM1, HOXB13, MITF, MLH1, MRE11A, MSH2, MSH3, MSH6, MUTYH, NBN, NF1, NTHL1, PALB2, PMS2, POLD1, POLE, POT1, PTEN, RAD50, RAD51C, RAD51D, SMAD4, SMARCA4, STK11, and TP53).  We discussed that many of the genes in the Comprehensive 40 panel are associated with specific hereditary cancer syndromes and published management guidelines: Hereditary Breast and Ovarian Cancer syndrome (BRCA1, BRCA2), Campa syndrome (MLH1, MSH2, MSH6, PMS2, EPCAM), Familial Adenomatous Polyposis (APC), Hereditary Diffuse Gastric Cancer (CDH1), Familial Atypical Multiple Mole Melanoma syndrome (CDK4, CDKN2A), Juvenile Polyposis syndrome (BMPR1A, SMAD4), Cowden syndrome (PTEN), Li Fraumeni  syndrome (TP53), Peutz-Jeghers syndrome (STK11), MUTYH Associated Polyposis (MUTYH), and Neurofibromatosis type 1 (NF1).   The GORDON, AXIN2, BRIP1, CHEK2, GREM1, MSH3, NBN, NTHL1, PALB2, POLD1, POLE, RAD51C, and RAD51D genes are associated with increased cancer risk and have published management guidelines for certain cancers.    The remaining genes (BAP1, BARD1, DICER1, HOXB13, MITF, MRE11A, POT1, RAD50, and SMARCA4) are associated with increased cancer risk and may allow us to make medical recommendations when mutations are identified.      We discussed that Danielle's sample is already in the lab, which will be used for her genetic testing.    Verbal consent was given over video and written on the consent form. Turnaround time is approximately 4 weeks.    Medical Management: For Danielle, we reviewed that the information from genetic testing may determine:    additional cancer screening for which Danielle may qualify (i.e. mammogram and breast MRI, more frequent colonoscopies, more frequent dermatologic exams, etc.),    options for risk reducing surgeries Danielle could consider (i.e. surgery to remove her other breast, surgery to remove her ovaries and/or uterus, etc.),      and targeted chemotherapies if she were to develop certain cancers in the future (i.e. immunotherapy for individuals with Campa syndrome, PARP inhibitors, etc.).     These recommendations and possible targeted chemotherapies will be discussed in detail once genetic testing is completed.     Plan:  1) Today Danielle elected to proceed with Comprehensive 40 panel through [facility].  2) A copy of the consent form and the after visit summary will be mailed to Danielle.  3) This information should be available in approximately 4 weeks, once the lab receives the sample.  4) I will call Danielle with the results once they become available.    Time spent on video: 30 minutes    Sky Davalos MS, [facility]  Licensed, Certified Genetic Counselor  Phone: [phone]

## 2022-03-25 ENCOUNTER — OFFICE VISIT (OUTPATIENT)
Dept: OBGYN | Facility: CLINIC | Age: 52
End: 2022-03-25
Payer: COMMERCIAL

## 2022-03-25 VITALS
OXYGEN SATURATION: 95 % | DIASTOLIC BLOOD PRESSURE: 81 MMHG | WEIGHT: 169.4 LBS | HEART RATE: 65 BPM | BODY MASS INDEX: 30.01 KG/M2 | SYSTOLIC BLOOD PRESSURE: 120 MMHG

## 2022-03-25 DIAGNOSIS — Z30.09 ENCOUNTER FOR OTHER GENERAL COUNSELING OR ADVICE ON CONTRACEPTION: Primary | ICD-10-CM

## 2022-03-25 DIAGNOSIS — Z30.430 ENCOUNTER FOR INSERTION OF INTRAUTERINE CONTRACEPTIVE DEVICE: ICD-10-CM

## 2022-03-25 LAB — HCG UR QL: NEGATIVE

## 2022-03-25 PROCEDURE — 99203 OFFICE O/P NEW LOW 30 MIN: CPT | Mod: 25 | Performed by: OBSTETRICS & GYNECOLOGY

## 2022-03-25 PROCEDURE — 58300 INSERT INTRAUTERINE DEVICE: CPT | Performed by: OBSTETRICS & GYNECOLOGY

## 2022-03-25 PROCEDURE — 81025 URINE PREGNANCY TEST: CPT | Performed by: OBSTETRICS & GYNECOLOGY

## 2022-03-25 RX ORDER — COPPER 313.4 MG/1
1 INTRAUTERINE DEVICE INTRAUTERINE ONCE
Status: COMPLETED
Start: 2022-03-25 | End: 2022-03-25

## 2022-03-25 RX ORDER — COPPER 313.4 MG/1
1 INTRAUTERINE DEVICE INTRAUTERINE ONCE
COMMUNITY

## 2022-03-25 RX ADMIN — COPPER 1 EACH: 313.4 INTRAUTERINE DEVICE INTRAUTERINE at 10:30

## 2022-03-25 NOTE — PROGRESS NOTES
Danielle is a 51 year old female  who presents today for consultation regarding contraceptive options.  She has been diagnosed with breast cancer and she had the Mirena IUD.  This was removed in January.  She is still in need of contraception as she has and FSH level that is still premenopausal.    Together, we reviewed the contraceptive options available.  We reviewed the success rates and the pregnancy rates of the options.  These include but are not limited to ParaGard IUD, the male and female sterilization procedures, barrier methods and spermicides. Questions seemed to be answered.      Past Medical History:   Diagnosis Date     Allergic rhinitis     mold     Major depressive disorder, recurrent episode (H)      Malignant neoplasm of right breast in female, estrogen receptor positive (H) 2021    Invasive Lobular     Mild persistent asthma      Patellofemoral pain syndrome      Tear meniscus knee     left     Past Surgical History:   Procedure Laterality Date     BREAST BIOPSY, CORE RT/LT Right 2021     EXCISE GANGLION WRIST      LT     LASIK       MASTECTOMY SIMPLE, SENTINEL NODE, COMBINED Right 2022    Procedure: right breast skin sparing mastectomy with right sentinel lymph node biopsy, axillary disection;  Surgeon: Yazmin Krueger MD;  Location:  OR     RECONSTRUCT BREAST, INSERT TISSUE EXPANDER, COMBINED Right 2022    Procedure: RIGHT FIRST STAGE BREAST RECONSTRUCTION WITH TISSUE EXPANDER,  ACELLULAR DERMAL MATRIX;  Surgeon: Linden Hernandez MD;  Location:  OR     TONSILLECTOMY & ADENOIDECTOMY       OB History    Para Term  AB Living   0 0 0 0 0 0   SAB IAB Ectopic Multiple Live Births   0 0 0 0 0        No Known Allergies       Current Outpatient Medications   Medication Sig Dispense Refill     acetaminophen (TYLENOL) 500 MG tablet Take 500-1,000 mg by mouth every 6 hours as needed for mild pain       albuterol (PROAIR HFA/PROVENTIL HFA/VENTOLIN  HFA) 108 (90 Base) MCG/ACT inhaler Inhale 2 puffs into the lungs every 4 hours as needed for shortness of breath / dyspnea or wheezing 18 g 4     Ascorbic Acid (VITAMIN C PO) Take 500 mg by mouth       calcium carbonate (OS-JACKSON 500 MG Algaaciq. CA) 500 MG tablet Take 500 mg by mouth daily       cetirizine (ZYRTEC) 10 MG tablet Take 1 tablet by mouth daily. 90 tablet 3     citalopram (CELEXA) 40 MG tablet Take 1 tablet (40 mg) by mouth daily 90 tablet 3     fluticasone (FLONASE) 50 MCG/ACT nasal spray Spray 1 spray into both nostrils daily 16 g 11     Magnesium 400 MG TABS Take by mouth daily       Multiple Vitamin (MULTI-VITAMIN) per tablet Take 1 tablet by mouth daily. 90 tablet 3     Omega-3 Fatty Acids (OMEGA-3 FISH OIL PO) Take by mouth daily       cephALEXin (KEFLEX) 500 MG capsule Take 1 capsule (500 mg) by mouth every 6 hours Take as long as drains in place. (Patient not taking: Reported on 3/17/2022) 40 capsule 1     methocarbamol (ROBAXIN) 750 MG tablet Take 1 tablet (750 mg) by mouth every 6 hours as needed for muscle spasms or other (pain) (Patient not taking: Reported on 3/25/2022) 40 tablet 1     oxyCODONE (ROXICODONE) 10 MG tablet Take 1 tablet (10 mg) by mouth every 4 hours as needed for severe pain ((pain rating 7-10)) (Patient not taking: Reported on 3/25/2022) 30 tablet 0     tamoxifen (NOLVADEX) 20 MG tablet Take 1 tablet (20 mg) by mouth daily (Patient not taking: Reported on 3/25/2022) 90 tablet 3     Social History     Socioeconomic History     Marital status:      Spouse name: Sunil     Number of children: 0     Years of education: 18     Highest education level: Not on file   Occupational History     Occupation:      Employer: [employer]     Employer: [employer]   Tobacco Use     Smoking status: Never Smoker     Smokeless tobacco: Never Used     Tobacco comment: non-smoking household   Vaping Use     Vaping Use: Never used   Substance and Sexual Activity      Alcohol use: Yes     Alcohol/week: 2.5 standard drinks     Comment: glass of wine 2-3 times weekly with 1 liquor drink on the weekend     Drug use: No     Sexual activity: Yes     Partners: Male     Birth control/protection: Condom   Other Topics Concern     Parent/sibling w/ CABG, MI or angioplasty before 65F 55M? Not Asked      Service No     Blood Transfusions No     Caffeine Concern No     Occupational Exposure No     Hobby Hazards No     Sleep Concern Yes     Comment: COMES AND GOES     Stress Concern No     Weight Concern Yes     Special Diet No     Back Care No     Exercise Yes     Comment: GYM MEMBERSHIP 3 HOURS WEEKLY     Bike Helmet Yes     Seat Belt Yes     Self-Exams Yes   Social History Narrative     Not on file     Social Determinants of Health     Financial Resource Strain: Not on file   Food Insecurity: Not on file   Transportation Needs: Not on file   Physical Activity: Not on file   Stress: Not on file   Social Connections: Not on file   Intimate Partner Violence: Not on file   Housing Stability: Not on file     Family History   Problem Relation Age of Onset     Breast Cancer Mother 56     Cancer Mother         skin     Cancer Father         skin     Cancer Maternal Grandmother         stomach     Cancer Maternal Grandfather         liver     Diabetes Maternal Grandfather      Heart Disease Maternal Grandfather         questionable MI     Colon Cancer Paternal Grandmother      C.A.D. Paternal Grandfather 70        MI     Ovarian Cancer Other         maternal great grandmother     Hypertension No family hx of      Cerebrovascular Disease No family hx of        Review of Systems:  10 point ROS of systems including Constitutional, Eyes, Respiratory, Cardiovascular, Gastroenterology, Genitourinary, Integumentary, Muscularskeletal, Psychiatric were all negative except for pertinent positives noted in my HPI and in the PMH.        EXAM:  /81 (BP Location: Left arm, Cuff Size: Adult Large)    Pulse 65   Wt 76.8 kg (169 lb 6.4 oz)   LMP 02/17/2022   SpO2 95%   BMI 30.01 kg/m    Body mass index is 30.01 kg/m .  General:  WNWD female, NAD  Alert  Oriented x 3  Gait:  Normal  Skin:  Normal skin turgor  HEENT:  NC/AT, EOMI  Neck:  Symmetrical, no masses  Lungs:  Good respiratory effort   Abdomen:  Non-tender, non-distended.  Vulva: No external lesions, normal hair distribution, no adenopathy  BUS:  Normal, no masses noted  Urethra:  No hypermobility noted  Urethral meatus:  No masses or lesions seen.   Vagina: Moist, pink, no abnormal discharge, well rugated, no lesions  Cervix: Smooth, pink, no visible lesions  Uterus: Normal size, anteverted, non-tender, mobile  Ovaries: No mass, non-tender, mobile  Extremities:  No clubbing, cyanosis or edema      ASSESSMENT:  Contraceptive management/Family Planning.  Encounter for ParaGard IUD  Invasive Lobular cancer.        PLAN:  We reviewed the options and she is not interested in a surgical approach at this time, due to her history.  We discussed the ParaGard IUD and the goals and limitations and the side effects with heavier bleeding and more cramping, that is customarily seen with the product.  We also reviewed the Tamoxifen use and bleeding that might occur.  She voices understanding.         Nikita Dsouza MD        PROCEDURE  I discussed the method, effectiveness, contraindications, risks, benefits, alternatives and the insertion procedure itself.  Patient was an appropriate candidate and desired to proceed.  Consent signed.  Pregnancy test today is negative.   After appropriate preparation, the cervix was grasped with a tenaculum and the uterine cavity sounded to 9 cm.  The ParaGard IUD was inserted using the standard and sterile technique.  The strings were trimmed to approximately 2.5 cm.  No apparent complications.  Patient tolerated the procedure well.  The IUD effectiveness is 10 years.  Followup in 1 month for IUD check, yearly for annual exams.   She should alert us to any GYN problems.      IUD DEVICE:  LOT # [id number]  EXP Date January 2026    Nikita Dsouza MD

## 2022-03-28 ENCOUNTER — APPOINTMENT (OUTPATIENT)
Dept: RADIATION ONCOLOGY | Facility: CLINIC | Age: 52
End: 2022-03-28
Payer: COMMERCIAL

## 2022-03-28 PROCEDURE — 77293 RESPIRATOR MOTION MGMT SIMUL: CPT | Performed by: SURGERY

## 2022-03-28 PROCEDURE — 77295 3-D RADIOTHERAPY PLAN: CPT | Performed by: SURGERY

## 2022-03-28 PROCEDURE — 77300 RADIATION THERAPY DOSE PLAN: CPT | Performed by: SURGERY

## 2022-03-28 PROCEDURE — 77334 RADIATION TREATMENT AID(S): CPT | Performed by: SURGERY

## 2022-03-29 ENCOUNTER — HOSPITAL ENCOUNTER (OUTPATIENT)
Dept: PHYSICAL THERAPY | Facility: CLINIC | Age: 52
Setting detail: THERAPIES SERIES
Discharge: HOME | End: 2022-03-29
Attending: FAMILY MEDICINE
Payer: COMMERCIAL

## 2022-03-29 DIAGNOSIS — C50.511 MALIGNANT NEOPLASM OF LOWER-OUTER QUADRANT OF RIGHT FEMALE BREAST, UNSPECIFIED ESTROGEN RECEPTOR STATUS (H): Primary | ICD-10-CM

## 2022-03-29 PROCEDURE — 97140 MANUAL THERAPY 1/> REGIONS: CPT | Mod: GP | Performed by: PHYSICAL THERAPIST

## 2022-03-31 ENCOUNTER — TELEPHONE (OUTPATIENT)
Dept: RADIATION ONCOLOGY | Facility: CLINIC | Age: 52
End: 2022-03-31

## 2022-03-31 ENCOUNTER — APPOINTMENT (OUTPATIENT)
Dept: RADIATION ONCOLOGY | Facility: CLINIC | Age: 52
End: 2022-03-31
Payer: COMMERCIAL

## 2022-03-31 ENCOUNTER — ALLIED HEALTH/NURSE VISIT (OUTPATIENT)
Dept: RADIATION ONCOLOGY | Facility: CLINIC | Age: 52
End: 2022-03-31
Payer: COMMERCIAL

## 2022-03-31 DIAGNOSIS — Z17.0 MALIGNANT NEOPLASM OF RIGHT BREAST IN FEMALE, ESTROGEN RECEPTOR POSITIVE, UNSPECIFIED SITE OF BREAST (H): Primary | ICD-10-CM

## 2022-03-31 DIAGNOSIS — C50.911 MALIGNANT NEOPLASM OF RIGHT BREAST IN FEMALE, ESTROGEN RECEPTOR POSITIVE, UNSPECIFIED SITE OF BREAST (H): Primary | ICD-10-CM

## 2022-03-31 PROCEDURE — 77280 THER RAD SIMULAJ FIELD SMPL: CPT | Performed by: SURGERY

## 2022-03-31 NOTE — NURSING NOTE
Teaching Flowsheet   Relevant Diagnosis: breast cancer  Teaching Topic: radiation therapy     Person(s) involved in teaching:   Patient     Motivation Level:  Asks Questions: Yes  Eager to Learn: Yes  Cooperative: Yes  Receptive (willing/able to accept information): Yes  Any cultural factors/Adventist beliefs that may influence understanding or compliance? No       Patient demonstrates understanding of the following:  Reason for the appointment, diagnosis and treatment plan: Yes  Knowledge of proper use of medications and conditions for which they are ordered (with special attention to potential side effects or drug interactions): Yes  Which situations necessitate calling provider and whom to contact: Yes       Teaching Concerns Addressed:   Comments: radiation therapy education: fatigue, skin changes and skin cares     Proper use and care of  (medical equip, care aids, etc.): NA  Nutritional needs and diet plan: Yes  Pain management techniques: Yes  Wound Care: Yes  How and/when to access community resources: Yes     Instructional Materials Used/Given: Radiation therapy educational handouts: fatigue, skin changes and skin cares     Time spent with patient: 15 minutes.    Sherry Deras RN BSN OCN CBCN

## 2022-03-31 NOTE — TELEPHONE ENCOUNTER
Good morning Dr. Osullivan,    This is a peer to peer request for 3D/DIBH radiation therapy treatment. The insurance is requesting this to better understand the reason why the test is being ordered. This peer to peer needs to be completed on or before 03/31/2022.     Please call the insurance company and reference the following information:    Payor phone number: [phone]    Case number: [id number]    Patient ID: [id number]- [facility]    Reason why it was denied:Dr. Mondragon from [facility] called and stated that she is unable to approve the request because per the [facility] policy IGRT/DIBH is not approvable with R breast cancer. She states you can call her directly to do a peer to peer. Her number is above.        Requesting response of outcome back to FC's on approval/denial with the following information:   o auth#  o date range  o who they spoke to     If you have any further questions please feel free to reach out to me. Thank you for your attention on this.     Henny Bolanos  Senior Intake Financial Counselor   [employer] [facility]  [address]  [place] [address]  Ph: [phone]  Fax: [phone]

## 2022-04-01 ENCOUNTER — APPOINTMENT (OUTPATIENT)
Dept: RADIATION ONCOLOGY | Facility: CLINIC | Age: 52
End: 2022-04-01
Payer: COMMERCIAL

## 2022-04-05 ENCOUNTER — APPOINTMENT (OUTPATIENT)
Dept: RADIATION ONCOLOGY | Facility: CLINIC | Age: 52
End: 2022-04-05
Payer: COMMERCIAL

## 2022-04-05 ENCOUNTER — HOSPITAL ENCOUNTER (OUTPATIENT)
Dept: PHYSICAL THERAPY | Facility: CLINIC | Age: 52
Setting detail: THERAPIES SERIES
Discharge: HOME | End: 2022-04-05
Attending: FAMILY MEDICINE
Payer: COMMERCIAL

## 2022-04-05 PROCEDURE — G6013 RADIATION TREATMENT DELIVERY: HCPCS | Performed by: SURGERY

## 2022-04-05 PROCEDURE — 97110 THERAPEUTIC EXERCISES: CPT | Mod: GP | Performed by: PHYSICAL THERAPIST

## 2022-04-05 PROCEDURE — G6017 INTRAFRACTION TRACK MOTION: HCPCS | Performed by: SURGERY

## 2022-04-05 PROCEDURE — 77332 RADIATION TREATMENT AID(S): CPT | Performed by: SURGERY

## 2022-04-06 ENCOUNTER — APPOINTMENT (OUTPATIENT)
Dept: RADIATION ONCOLOGY | Facility: CLINIC | Age: 52
End: 2022-04-06
Payer: COMMERCIAL

## 2022-04-06 ENCOUNTER — OFFICE VISIT (OUTPATIENT)
Dept: RADIATION ONCOLOGY | Facility: CLINIC | Age: 52
End: 2022-04-06
Payer: COMMERCIAL

## 2022-04-06 VITALS
RESPIRATION RATE: 18 BRPM | DIASTOLIC BLOOD PRESSURE: 88 MMHG | BODY MASS INDEX: 30.11 KG/M2 | OXYGEN SATURATION: 99 % | SYSTOLIC BLOOD PRESSURE: 135 MMHG | HEART RATE: 54 BPM | WEIGHT: 170 LBS | TEMPERATURE: 98.7 F

## 2022-04-06 DIAGNOSIS — Z17.0 MALIGNANT NEOPLASM OF RIGHT BREAST IN FEMALE, ESTROGEN RECEPTOR POSITIVE, UNSPECIFIED SITE OF BREAST (H): Primary | ICD-10-CM

## 2022-04-06 DIAGNOSIS — C50.911 MALIGNANT NEOPLASM OF RIGHT BREAST IN FEMALE, ESTROGEN RECEPTOR POSITIVE, UNSPECIFIED SITE OF BREAST (H): Primary | ICD-10-CM

## 2022-04-06 PROCEDURE — 99207 PR DROP WITH A PROCEDURE: CPT | Performed by: SURGERY

## 2022-04-06 PROCEDURE — G0452 MOLECULAR PATHOLOGY INTERPR: HCPCS | Mod: 26 | Performed by: PATHOLOGY

## 2022-04-06 PROCEDURE — G6013 RADIATION TREATMENT DELIVERY: HCPCS | Performed by: SURGERY

## 2022-04-06 PROCEDURE — G6017 INTRAFRACTION TRACK MOTION: HCPCS | Performed by: SURGERY

## 2022-04-06 ASSESSMENT — PAIN SCALES - GENERAL: PAINLEVEL: NO PAIN (0)

## 2022-04-06 NOTE — PATIENT INSTRUCTIONS
Please contact [facility] RN with questions or concerns following today's appointment: [phone].    Thank you!

## 2022-04-06 NOTE — LETTER
2022         RE: Danielle Thrasher  [address] [facility] [address]        Dear Colleague,    Thank you for referring your patient, Danielle Thrasher, to the [facility] [place]. Please see a copy of my visit note below.    [facility] PHYSICIANS  SPECIALIZING IN BREAKTHROUGHS  Radiation Oncology    On Treatment Visit Note      Danielle Thrasher      Date: 2022   MRN: [id number]   : 1970  Diagnosis: breast cancer ER (+)        ID: Ms. Thrasher is 51YF with RIGHT sided ER+/NE+/Her2- [id number] (Stage IIA) invasive lobular carcinoma status post right skin sparing mastectomy with immediate reconstruction and SLNBx (Dr. Krueger, Dr. Hernandez, 22) with pathology demonstrating 7.5 cm tumor, 1/7 LN positive (5.5mm, no ABIGAIL), negative margins, no LVSI, pT3N1a. Oncotype is 11 and she will not be receiving chemotherapy, but endocrine therapy (Dr. Huitron).      Reason for Visit:  On Radiation Treatment Visit       Treatment Summary to Date  Treatment Site: right chest wall + right SCV Current Dose: 400/5000 cGy Fractions:       Chemotherapy  Chemo concurrent with radx?: No (Dr. Huitron)    Subjective:  No complaints.     Nursing ROS:   Nutrition Alteration  Diet Type: Patient's Preference  Skin  Skin Reaction: 0 - No changes  Skin Intervention: patient reports applying Aquaphor two times daily        Cardiovascular  Respiratory effort: 1 - Normal - without distress        Psychosocial  Mood - Anxiety: 0 - Normal  Mood - Depression: 0 - Normal  Psychosocial Note: energy level at baseline  Pain Assessment  0-10 Pain Scale: 0      Objective:   /88 (BP Location: Left arm, Patient Position: Chair, Cuff Size: Adult Regular)   Pulse 54   Temp 98.7  F (37.1  C) (Oral)   Resp 18   Wt 77.1 kg (170 lb)   SpO2 99%   BMI 30.11 kg/m    Gen: Appears well, in no acute distress  Skin: no erythema, no skin breakdown     Labs:  CBC RESULTS: No  results for input(s): WBC, RBC, HGB, HCT, MCV, MCH, MCHC, RDW, PLT in the last 77596 hours.  ELECTROLYTES:  No results for input(s): NA, POTASSIUM, CHLORIDE, JACKSON, CO2, BUN, CR, GLC in the last 19360 hours.    Assessment:    Tolerating radiation therapy well.  All questions and concerns addressed.      Plan:   1. Continue current therapy.        Mosaiq chart and setup information reviewed  MVCT/IGRT images checked    Medication Review  Med list reviewed with patient?: Yes  Med list printed and given: Offered and declined    Educational Topic Discussed  Education Instructions: radiation therapy side effects: fatigue, skin changes and skin cares    Carlos Alberto Osullivan MD  Radiation Oncology   [facility]: [phone]            Again, thank you for allowing me to participate in the care of your patient.        Sincerely,        Carlos Alberto Osullivan MD

## 2022-04-06 NOTE — PROGRESS NOTES
[facility] PHYSICIANS  SPECIALIZING IN BREAKTHROUGHS  Radiation Oncology    On Treatment Visit Note      Danielle Thrasher      Date: 2022   MRN: [id number]   : 1970  Diagnosis: breast cancer ER (+)        ID: Ms. Thrasher is 51YF with RIGHT sided ER+/NV+/Her2- [id number] (Stage IIA) invasive lobular carcinoma status post right skin sparing mastectomy with immediate reconstruction and SLNBx (Dr. Krueger, Dr. Hernandez, 22) with pathology demonstrating 7.5 cm tumor, 1/7 LN positive (5.5mm, no ABIGAIL), negative margins, no LVSI, pT3N1a. Oncotype is 11 and she will not be receiving chemotherapy, but endocrine therapy (Dr. Huitron).      Reason for Visit:  On Radiation Treatment Visit       Treatment Summary to Date  Treatment Site: right chest wall + right SCV Current Dose: 400/5000 cGy Fractions:       Chemotherapy  Chemo concurrent with radx?: No (Dr. Huitron)    Subjective:  No complaints.     Nursing ROS:   Nutrition Alteration  Diet Type: Patient's Preference  Skin  Skin Reaction: 0 - No changes  Skin Intervention: patient reports applying Aquaphor two times daily        Cardiovascular  Respiratory effort: 1 - Normal - without distress        Psychosocial  Mood - Anxiety: 0 - Normal  Mood - Depression: 0 - Normal  Psychosocial Note: energy level at baseline  Pain Assessment  0-10 Pain Scale: 0      Objective:   /88 (BP Location: Left arm, Patient Position: Chair, Cuff Size: Adult Regular)   Pulse 54   Temp 98.7  F (37.1  C) (Oral)   Resp 18   Wt 77.1 kg (170 lb)   SpO2 99%   BMI 30.11 kg/m    Gen: Appears well, in no acute distress  Skin: no erythema, no skin breakdown     Labs:  CBC RESULTS: No results for input(s): WBC, RBC, HGB, HCT, MCV, MCH, MCHC, RDW, PLT in the last 81147 hours.  ELECTROLYTES:  No results for input(s): NA, POTASSIUM, CHLORIDE, JACKSON, CO2, BUN, CR, GLC in the last 70033 hours.    Assessment:    Tolerating radiation therapy well.  All questions and  concerns addressed.      Plan:   1. Continue current therapy.        Mosaiq chart and setup information reviewed  MVCT/IGRT images checked    Medication Review  Med list reviewed with patient?: Yes  Med list printed and given: Offered and declined    Educational Topic Discussed  Education Instructions: radiation therapy side effects: fatigue, skin changes and skin cares    Carlos Alberto Osullivan MD  Radiation Oncology   [facility]  Clinic: [phone]         1-2 weeks

## 2022-04-07 ENCOUNTER — VIRTUAL VISIT (OUTPATIENT)
Dept: PSYCHOLOGY | Facility: CLINIC | Age: 52
End: 2022-04-07
Payer: COMMERCIAL

## 2022-04-07 ENCOUNTER — APPOINTMENT (OUTPATIENT)
Dept: RADIATION ONCOLOGY | Facility: CLINIC | Age: 52
End: 2022-04-07
Payer: COMMERCIAL

## 2022-04-07 DIAGNOSIS — F41.1 GAD (GENERALIZED ANXIETY DISORDER): Primary | ICD-10-CM

## 2022-04-07 PROCEDURE — 90834 PSYTX W PT 45 MINUTES: CPT | Mod: GT | Performed by: COUNSELOR

## 2022-04-07 PROCEDURE — G6013 RADIATION TREATMENT DELIVERY: HCPCS | Performed by: SURGERY

## 2022-04-07 PROCEDURE — G6017 INTRAFRACTION TRACK MOTION: HCPCS | Performed by: SURGERY

## 2022-04-07 PROCEDURE — 77331 SPECIAL RADIATION DOSIMETRY: CPT | Performed by: SURGERY

## 2022-04-07 NOTE — PROGRESS NOTES
Progress Note    Patient Name: Danielle Thrasher  Date: 2022           Service Type: Individual      Session Start Time: 4:30pm  Session End Time: 5:15pm     Session Length: 38-52 min minutes    Session #: 10    Attendees: Client attended alone    Service Modality:  Video Visit:      Provider verified identity through the following two step process.  Patient provided:  Patient  and Patient address    Telemedicine Visit: The patient's condition can be safely assessed and treated via synchronous audio and visual telemedicine encounter.      Reason for Telemedicine Visit: Services only offered telehealth    Originating Site (Patient Location): Patient's home    Distant Site (Provider Location): Provider Remote Setting- Home Office    Consent:  The patient/guardian has verbally consented to: the potential risks and benefits of telemedicine (video visit) versus in person care; bill my insurance or make self-payment for services provided; and responsibility for payment of non-covered services.     Patient would like the video invitation sent by:  My Chart    Mode of Communication:  Video Conference via [employer]well    As the provider I attest to compliance with applicable laws and regulations related to telemedicine.     Treatment Plan Last Reviewed: 2021 next review date 2022  PHQ-9 / BRIDGET-7 : see epic updates    DATA  Interactive Complexity: No  Crisis: No       Progress Since Last Session (Related to Symptoms / Goals / Homework):   Symptoms: Improving Patient is working towards therapeutic goals    Homework: Completed in session      Episode of Care Goals: Satisfactory progress - ACTION (Actively working towards change); Intervened by reinforcing change plan / affirming steps taken     Current / Ongoing Stressors and Concerns:   Relational stress, diagnosed with stage 2 breast cancer.    Pt reported feelings of anxiety and grief related to recent start of  radiation therapy.      Treatment Objective(s) Addressed in This Session:   Goal - Depression: Patient will alleviate depressive symptoms and return to previous level of effective functioning.  I will know I've met my goal when I don't feel in this hole anymore.      Objective #A (Client Action)  Patient will describe situations, thoughts, feelings, and actions associated with depression, their impact on functioning and attempts to resolve them. Patient will do this 4 out of 7 days of the week.   - Worked to identify triggers for depression     Objective #B  Patient will use cognitive strategies identified in therapy to challenge negative thought patterns 90% of the time.  - Worked to decrease pt's negative self talk     Objective #C  Patient will identify and use at least three coping skills and distraction and diversion activities to manage feelings of depression. Pt will use these skills at least three times per week.  - Worked to increase use of coping skills throughout the day/week.        Goal- Anxiety: Patient will reduce overall frequency, intensity, and duration of the anxiety so that daily functioning is not impaired.     I will know I've met my goal when I am less anxious.       Objective #A (Client Action)  Patient will describe situations, thoughts, feelings, and actions associated with anxieties and worries, their impact on functioning and attempts to resolve them. Patient will do this 4 out of 7 days of the week.   - Worked to identify triggers for anxiety     Objective #B  Patient will use cognitive strategies identified in therapy to challenge negative thought patterns 90% of the time.  - Worked to decrease pt's negative self talk     Objective #C  Patient will identify and use at least three coping skills and distraction and diversion activities to manage feelings of anxiety. Pt will use these skills at least three times per week.  - Worked to increase use of coping skills throughout the  day/week.     Intervention:   Therapist met with patient to review goals and interventions. Therapist utilized reflective listening as patient gave brief reflection of the week. Patient processed emotions related to current life stressors.    Utilized CBT - discussed emotions related to the stressful events and recognizing negative cognitions related to fears of the future. Therapist supported the pt in remaining mindful of the present moment and reframing cognitions related to worst case scenario. Therapist also worked to normalize and validate patient's experience of grief.          ASSESSMENT: Current Emotional / Mental Status (status of significant symptoms):   Risk status (Self / Other harm or suicidal ideation)   Patient denies current fears or concerns for personal safety.   Patient denies current or recent suicidal ideation or behaviors.   Patient denies current or recent homicidal ideation or behaviors.   Patient denies current or recent self injurious behavior or ideation.   Patient denies other safety concerns.   Patient reports there has been no change in risk factors since their last session.     Patient reports there has been no change in protective factors since their last session.     Recommended that patient call 911 or go to the local ED should there be a change in any of these risk factors.     Appearance:   Appropriate    Eye Contact:   Good    Psychomotor Behavior: Normal    Attitude:   Cooperative    Orientation:   All   Speech    Rate / Production: Normal     Volume:  Normal    Mood:    Sad    Affect:    Appropriate  Tearful   Thought Content:  Clear    Thought Form:  Coherent  Logical    Insight:    Fair      Medication Review:   No changes to current psychiatric medication(s)     Medication Compliance:   Yes     Changes in Health Issues:   None reported     Chemical Use Review:   Substance Use: Chemical use reviewed, no active concerns identified      Tobacco Use: No current tobacco use.       Diagnosis:  1. BRIDGET (generalized anxiety disorder)        Collateral Reports Completed:   Not Applicable    PLAN: (Patient Tasks / Therapist Tasks / Other)  1)Patient will continue to practice mindfulness 3 times per week  2)Patient will practice self compassion 3 times per week  3)Next session scheduled for 2 weeks out  4) Will update treatment plan next session with updated assessments    MOSHE BUTLER, [facility]      4/7/2022                                                                         ______________________________________________________________________    Treatment Plan    Patient's Name: Danielle Thrasher  YOB: 1970    Date: 10/19/2021      DSM5 Diagnoses: 300.02 (F41.1) Generalized Anxiety Disorder or Adjustment Disorders  309.0 (F43.21) With depressed mood  Psychosocial / Contextual Factors: Pt is a 51 year old female who works remote and lives with her .  WHODAS: 23    Referral / Collaboration:  none needed.    Anticipated number of session or this episode of care: 32-36      MeasurableTreatment Goal(s) related to diagnosis / functional impairment(s)  Goal - Depression: Patient will alleviate depressive symptoms and return to previous level of effective functioning.  I will know I've met my goal when I don't feel in this hole anymore.     Objective #A (Client Action)  Patient will describe situations, thoughts, feelings, and actions associated with depression, their impact on functioning and attempts to resolve them. Patient will do this 4 out of 7 days of the week.     Intervention(s)  Therapist will provide psychoeducation, behavioral activation, and cognitive restructuring.    Status: Continued- Date: 1/19/2022      Objective #B  Patient will use cognitive strategies identified in therapy to challenge negative thought patterns 90% of the time.    Intervention(s)  Therapist will provide psychoeducation, behavioral activation, and cognitive restructuring.    Status:  Continued- Date: 1/19/2022    Objective #C  Patient will identify and use at least three coping skills and distraction and diversion activities to manage feelings of depression. Pt will use these skills at least three times per week.    Intervention(s)  Therapist will provide psychoeducation, behavioral activation, and cognitive restructuring.    Status: Continued- Date: 1/19/2022      Goal- Anxiety: Patient will reduce overall frequency, intensity, and duration of the anxiety so that daily functioning is not impaired.     I will know I've met my goal when I am less anxious.      Objective #A (Client Action)  Patient will describe situations, thoughts, feelings, and actions associated with anxieties and worries, their impact on functioning and attempts to resolve them. Patient will do this 4 out of 7 days of the week.     Intervention(s)  Therapist will provide psychoeducation, behavioral activation, and cognitive restructuring.  Status: Continued- Date: 1/19/2022    Objective #B  Patient will use cognitive strategies identified in therapy to challenge negative thought patterns 90% of the time.    Intervention(s)  Therapist will provide psychoeducation, behavioral activation, and cognitive restructuring.  Status: Continued- Date: 1/19/2022    Objective #C  Patient will identify and use at least three coping skills and distraction and diversion activities to manage feelings of anxiety. Pt will use these skills at least three times per week.    Intervention(s)  Therapist will provide psychoeducation, behavioral activation, and cognitive restructuring.  Status: Continued- Date: 1/19/2022          Patient has reviewed and agreed to the above plan.      MOSHE BUTLER [facility]  10/26/2021, 1/19/2022

## 2022-04-08 ENCOUNTER — APPOINTMENT (OUTPATIENT)
Dept: RADIATION ONCOLOGY | Facility: CLINIC | Age: 52
End: 2022-04-08
Payer: COMMERCIAL

## 2022-04-08 PROCEDURE — G6013 RADIATION TREATMENT DELIVERY: HCPCS | Performed by: RADIOLOGY

## 2022-04-08 PROCEDURE — G6017 INTRAFRACTION TRACK MOTION: HCPCS | Performed by: RADIOLOGY

## 2022-04-11 ENCOUNTER — VIRTUAL VISIT (OUTPATIENT)
Dept: ONCOLOGY | Facility: CLINIC | Age: 52
End: 2022-04-11
Attending: GENETIC COUNSELOR, MS
Payer: COMMERCIAL

## 2022-04-11 DIAGNOSIS — Z17.0 MALIGNANT NEOPLASM OF LOWER-OUTER QUADRANT OF RIGHT BREAST OF FEMALE, ESTROGEN RECEPTOR POSITIVE (H): Primary | ICD-10-CM

## 2022-04-11 DIAGNOSIS — Z80.3 FAMILY HISTORY OF MALIGNANT NEOPLASM OF BREAST: ICD-10-CM

## 2022-04-11 DIAGNOSIS — Z80.0 FAMILY HISTORY OF MALIGNANT NEOPLASM OF COLON: ICD-10-CM

## 2022-04-11 DIAGNOSIS — C50.511 MALIGNANT NEOPLASM OF LOWER-OUTER QUADRANT OF RIGHT BREAST OF FEMALE, ESTROGEN RECEPTOR POSITIVE (H): Primary | ICD-10-CM

## 2022-04-11 PROCEDURE — 999N000069 HC STATISTIC GENETIC COUNSELING, < 16 MIN: Mod: GT,95 | Performed by: GENETIC COUNSELOR, MS

## 2022-04-11 NOTE — Clinical Note
"Hello,    Please enclose a copy of the test report from the laboratory tab titled \"next generation sequencing\" dated 3/24/22 (Order [id number]) to send to the patient along with the letter.    Referring provider: please see summary of genetic test results below.    Thank you,  Sky"

## 2022-04-11 NOTE — LETTER
"    Cancer Risk Management  Program Locations    [facility]  [facility]  [facility]  Mailing Address  Cancer Risk Management Program  [facility]  [address]  [place] [address]    New patient appointments  [phone]  April 11, 2022    Danielle Thrasher  [address]  [place] [address]    Dear Danielle,    It was a pleasure speaking with you over video on 4/11/2022. Here is a copy of the progress note from our discussion. If you have any additional questions, please feel free to call.    Referring Provider: Yazmin Krueger MD    Presenting Information:  I spoke to Danielle by video today to discuss her genetic testing results. Her blood was drawn on 1/4/22. The Comprehensive 40 Cancer panel was ordered from [facility] at [facility]. This testing was done because of Danielle's personal history of breast cancer and family history of breast and colon cancer.    Genetic Testing Result: NEGATIVE  Danielle tested negative for mutations in the APC, GORDON, AXIN2, BAP1, BARD1, BMPR1A, BRCA1, BRCA2, BRIP1, CDH1, CDK4, CDKN2A, CHEK2, DICER1, EPCAM, GREM1, HOXB13, MITF, MLH1, MRE11A, MSH2, MSH3, MSH6, MUTYH, NBN, NF1, NTHL1, PALB2, PMS2, POLD1, POLE, POT1, PTEN, RAD50, RAD51C, RAD51D, SMAD4, SMARCA4, STK11, and TP53 gene. No mutations were found in the 40 genes analyzed. We reviewed the autosomal dominant inheritance of these genes. Danielle cannot pass on a mutation in any of these genes to her children based on this test result. Mutations in these genes do not skip generations.        A copy of the test report can be found in the Laboratory tab, dated 3/24/22, and named \"Next generation sequencing\".     Interpretation:  We discussed a couple of different interpretations of this negative test result.  "   1. One explanation may be that there is a different gene or combination of genes and environment that are associated with the cancers in this family.  2. There is also a small possibility that there is a mutation in one of these genes, and the testing laboratory could not find it with their current testing methods.       Screening:  Based on this negative test result, it is important for Danielle and her relatives to refer back to the family history for appropriate cancer screening.      Danielle s close female relatives remain at increased risk for breast cancer given their family history. Breast cancer screening is generally recommended to begin approximately 10 years younger than the earliest age of breast cancer diagnosis in the family, or at age 40, whichever comes first. In this family, screening may begin at age 40. Breast screening options should be discussed with an individual's primary care provider and a genetic counselor, to determine at what age to begin screening, what screening is appropriate, and if additional screening (such as breast MRI) is necessary based on personal/family history factors.      Other population cancer screening options, such as those recommended by the American Cancer Society and the National Comprehensive Cancer Network (NCCN), are also appropriate for Danielle and her family. These screening recommendations may change if there are changes to Danielle's personal and/or family history of cancer. Final screening recommendations should be made by each individual's primary care provider.      Although Danielle's genetic testing result was negative, other relatives may still carry a gene mutation associated with breast and/or colon cancer. Genetic counseling is recommended for her mother to discuss genetic testing options. If she, or any other relatives do pursue genetic testing, Danielle is encouraged to contact me so that we may review the impact of their test results on  her.    Summary:  We do not have an explanation for Danielle's personal or family history of cancer. While no genetic changes were identified, Danielle may still be at risk for certain cancers due to family history, environmental factors, or other genetic causes not identified by this test. Because of that, it is important that she continue with cancer screening based on her personal and family history as discussed above.    Genetic testing is rapidly advancing, and new cancer susceptibility genes will most likely be identified in the future. Therefore, I encouraged Danielle to contact me annually or if there are changes in her personal or family history. This may change how we assess her cancer risk, screening, and the testing we would offer.    Plan:  1.  A copy of the test results will be mailed to Danielle.  2. She plans to follow-up with her other providers.  3. She should contact me regularly, or sooner if her family history changes.    If Danielle has any further questions, I encouraged her to contact me at [phone].    Time spent on video: 5 minutes.    Sky Davalos MS, [facility]  Licensed, Certified Genetic Counselor  Phone: [phone]

## 2022-04-11 NOTE — PROGRESS NOTES
"4/11/2022    Referring Provider: Yazmin Krueger MD    Presenting Information:  I spoke to Danielle by video today to discuss her genetic testing results. Her blood was drawn on 1/4/22. The Comprehensive 40 Cancer panel was ordered from [facility] at [facility]. This testing was done because of Danielle's personal history of breast cancer and family history of breast and colon cancer.    Genetic Testing Result: NEGATIVE  Danielle tested negative for mutations in the APC, GORDON, AXIN2, BAP1, BARD1, BMPR1A, BRCA1, BRCA2, BRIP1, CDH1, CDK4, CDKN2A, CHEK2, DICER1, EPCAM, GREM1, HOXB13, MITF, MLH1, MRE11A, MSH2, MSH3, MSH6, MUTYH, NBN, NF1, NTHL1, PALB2, PMS2, POLD1, POLE, POT1, PTEN, RAD50, RAD51C, RAD51D, SMAD4, SMARCA4, STK11, and TP53 gene. No mutations were found in the 40 genes analyzed. We reviewed the autosomal dominant inheritance of these genes. Danielle cannot pass on a mutation in any of these genes to her children based on this test result. Mutations in these genes do not skip generations.        A copy of the test report can be found in the Laboratory tab, dated 3/24/22, and named \"Next generation sequencing\".     Interpretation:  We discussed a couple of different interpretations of this negative test result.    1. One explanation may be that there is a different gene or combination of genes and environment that are associated with the cancers in this family.  2. There is also a small possibility that there is a mutation in one of these genes, and the testing laboratory could not find it with their current testing methods.       Screening:  Based on this negative test result, it is important for Danielle and her relatives to refer back to the family history for appropriate cancer screening.      Danielle s close female relatives remain at increased risk for breast cancer given their family history. Breast cancer screening is generally recommended to begin approximately 10 years " younger than the earliest age of breast cancer diagnosis in the family, or at age 40, whichever comes first. In this family, screening may begin at age 40. Breast screening options should be discussed with an individual's primary care provider and a genetic counselor, to determine at what age to begin screening, what screening is appropriate, and if additional screening (such as breast MRI) is necessary based on personal/family history factors.      Other population cancer screening options, such as those recommended by the American Cancer Society and the National Comprehensive Cancer Network (NCCN), are also appropriate for Danielle and her family. These screening recommendations may change if there are changes to Danielle's personal and/or family history of cancer. Final screening recommendations should be made by each individual's primary care provider.      Although Danielle's genetic testing result was negative, other relatives may still carry a gene mutation associated with breast and/or colon cancer. Genetic counseling is recommended for her mother to discuss genetic testing options. If she, or any other relatives do pursue genetic testing, Danielle is encouraged to contact me so that we may review the impact of their test results on her.    Summary:  We do not have an explanation for Danielle's personal or family history of cancer. While no genetic changes were identified, Danielle may still be at risk for certain cancers due to family history, environmental factors, or other genetic causes not identified by this test. Because of that, it is important that she continue with cancer screening based on her personal and family history as discussed above.    Genetic testing is rapidly advancing, and new cancer susceptibility genes will most likely be identified in the future. Therefore, I encouraged Danielle to contact me annually or if there are changes in her personal or family history. This may change how we assess  her cancer risk, screening, and the testing we would offer.    Plan:  1.  A copy of the test results will be mailed to Danielle.  2. She plans to follow-up with her other providers.  3. She should contact me regularly, or sooner if her family history changes.    If Danielle has any further questions, I encouraged her to contact me at [phone].    Time spent on video: 5 minutes.    Sky Davalos MS, [facility]  Licensed, Certified Genetic Counselor  Phone: [phone]    Danielle is a 51 year old who is being evaluated via a billable video visit.      Pt is in [place]    How would you like to obtain your AVS? MyChart  If the video visit is dropped, the invitation should be resent by: Text to cell phone: [phone]  Will anyone else be joining your video visit? Carmen Chopra VF

## 2022-04-12 ENCOUNTER — OFFICE VISIT (OUTPATIENT)
Dept: RADIATION ONCOLOGY | Facility: CLINIC | Age: 52
End: 2022-04-12
Payer: COMMERCIAL

## 2022-04-12 ENCOUNTER — HOSPITAL ENCOUNTER (OUTPATIENT)
Dept: PHYSICAL THERAPY | Facility: CLINIC | Age: 52
Setting detail: THERAPIES SERIES
Discharge: HOME | End: 2022-04-12
Attending: FAMILY MEDICINE
Payer: COMMERCIAL

## 2022-04-12 VITALS
BODY MASS INDEX: 29.94 KG/M2 | OXYGEN SATURATION: 100 % | TEMPERATURE: 99 F | WEIGHT: 169 LBS | RESPIRATION RATE: 18 BRPM | SYSTOLIC BLOOD PRESSURE: 113 MMHG | HEART RATE: 63 BPM | DIASTOLIC BLOOD PRESSURE: 75 MMHG

## 2022-04-12 DIAGNOSIS — Z17.0 MALIGNANT NEOPLASM OF RIGHT BREAST IN FEMALE, ESTROGEN RECEPTOR POSITIVE, UNSPECIFIED SITE OF BREAST (H): Primary | ICD-10-CM

## 2022-04-12 DIAGNOSIS — C50.911 MALIGNANT NEOPLASM OF RIGHT BREAST IN FEMALE, ESTROGEN RECEPTOR POSITIVE, UNSPECIFIED SITE OF BREAST (H): Primary | ICD-10-CM

## 2022-04-12 PROCEDURE — 99207 PR DROP WITH A PROCEDURE: CPT | Performed by: SURGERY

## 2022-04-12 PROCEDURE — 97140 MANUAL THERAPY 1/> REGIONS: CPT | Mod: GP | Performed by: PHYSICAL THERAPIST

## 2022-04-12 ASSESSMENT — PAIN SCALES - GENERAL: PAINLEVEL: NO PAIN (0)

## 2022-04-12 NOTE — LETTER
2022         RE: Danielle Thrasher  [address] [place] [address]        Dear Colleague,    Thank you for referring your patient, Danielle Thrasher, to the [facility] RADIATION [facility]COLOGY [place]. Please see a copy of my visit note below.    [facility] PHYSICIANS  SPECIALIZING IN BREAKTHROUGHS  Radiation Oncology    On Treatment Visit Note      Danielle Thrasher      Date: 2022   MRN: [id number]   : 1970  Diagnosis: breast cancer ER (+)        ID: Ms. Thrasher is 51YF with RIGHT sided ER+/DE+/Her2- [id number] (Stage IIA) invasive lobular carcinoma status post right skin sparing mastectomy with immediate reconstruction and SLNBx (Dr. Krueger, Dr. Hernandez, 22) with pathology demonstrating 7.5 cm tumor, 1/7 LN positive (5.5mm, no ABIGAIL), negative margins, no LVSI, pT3N1a. Oncotype is 11 and she will not be receiving chemotherapy, but endocrine therapy (Dr. Huitron).      Reason for Visit:  On Radiation Treatment Visit       Treatment Summary to Date  Treatment Site: right chest wall + right SCV Current Dose: 1200/5000 cGy Fractions:       Chemotherapy  Chemo concurrent with radx?: No (Dr. Huitron)    Subjective:   No complaints, tolerating treatments well. No cp, tenderness, no range of motion limitations. Stable energy levels.     Nursing ROS:   Nutrition Alteration  Diet Type: Patient's Preference  Skin  Skin Reaction: 1 - Faint erythema or dry desquamation (very faint, no desquamation)  Skin Intervention: patient reports applying Aquaphor two times daily        Cardiovascular  Respiratory effort: 1 - Normal - without distress        Psychosocial  Mood - Anxiety: 0 - Normal  Mood - Depression: 0 - Normal  Psychosocial Note: energy level at baseline  Pain Assessment  0-10 Pain Scale: 0  Pain Note: intermittent discomfort in the evening of right chest wall, reports taking Ibuprofen po as needed at night      Objective:   /75 (BP Location:  Left arm, Patient Position: Chair, Cuff Size: Adult Large)   Pulse 63   Temp 99  F (37.2  C) (Oral)   Resp 18   Wt 76.7 kg (169 lb)   LMP 02/18/2022   SpO2 100%   BMI 29.94 kg/m    Gen: Appears well, in no acute distress  Skin: faint erythema, no skin breakdown     Labs:  CBC RESULTS: No results for input(s): WBC, RBC, HGB, HCT, MCV, MCH, MCHC, RDW, PLT in the last 00659 hours.  ELECTROLYTES:  No results for input(s): NA, POTASSIUM, CHLORIDE, JACKSON, CO2, BUN, CR, GLC in the last 78963 hours.    Assessment:    Tolerating radiation therapy well.  All questions and concerns addressed.        Plan:   1. Continue current therapy.        Mosaiq chart and setup information reviewed  MVCT/IGRT images checked    Medication Review  Med list reviewed with patient?: Yes  Med list printed and given: Offered and declined    Educational Topic Discussed  Education Instructions: reviewed    Carlos Alberto Osullivan MD  Radiation Oncology   [facility]  Clinic: [phone]            Again, thank you for allowing me to participate in the care of your patient.        Sincerely,        Carlos Alberto Osullivan MD

## 2022-04-12 NOTE — PATIENT INSTRUCTIONS
Please contact [facility] RN with questions or concerns following today's appointment: [phone].    Thank you!

## 2022-04-12 NOTE — PROGRESS NOTES
[facility] PHYSICIANS  SPECIALIZING IN BREAKTHROUGHS  Radiation Oncology    On Treatment Visit Note      Danielle Thrasher      Date: 2022   MRN: [id number]   : 1970  Diagnosis: breast cancer ER (+)        ID: Ms. Thrasher is 51YF with RIGHT sided ER+/IL+/Her2- [id number] (Stage IIA) invasive lobular carcinoma status post right skin sparing mastectomy with immediate reconstruction and SLNBx (Dr. Krueger, Dr. Hernandez, 22) with pathology demonstrating 7.5 cm tumor, 1/7 LN positive (5.5mm, no ABIGAIL), negative margins, no LVSI, pT3N1a. Oncotype is 11 and she will not be receiving chemotherapy, but endocrine therapy (Dr. Huitron).      Reason for Visit:  On Radiation Treatment Visit       Treatment Summary to Date  Treatment Site: right chest wall + right SCV Current Dose: 1200/5000 cGy Fractions:       Chemotherapy  Chemo concurrent with radx?: No (Dr. Huitron)    Subjective:   No complaints, tolerating treatments well. No cp, tenderness, no range of motion limitations. Stable energy levels.     Nursing ROS:   Nutrition Alteration  Diet Type: Patient's Preference  Skin  Skin Reaction: 1 - Faint erythema or dry desquamation (very faint, no desquamation)  Skin Intervention: patient reports applying Aquaphor two times daily        Cardiovascular  Respiratory effort: 1 - Normal - without distress        Psychosocial  Mood - Anxiety: 0 - Normal  Mood - Depression: 0 - Normal  Psychosocial Note: energy level at baseline  Pain Assessment  0-10 Pain Scale: 0  Pain Note: intermittent discomfort in the evening of right chest wall, reports taking Ibuprofen po as needed at night      Objective:   /75 (BP Location: Left arm, Patient Position: Chair, Cuff Size: Adult Large)   Pulse 63   Temp 99  F (37.2  C) (Oral)   Resp 18   Wt 76.7 kg (169 lb)   LMP 2022   SpO2 100%   BMI 29.94 kg/m    Gen: Appears well, in no acute distress  Skin: faint erythema, no skin breakdown      Labs:  CBC RESULTS: No results for input(s): WBC, RBC, HGB, HCT, MCV, MCH, MCHC, RDW, PLT in the last 29898 hours.  ELECTROLYTES:  No results for input(s): NA, POTASSIUM, CHLORIDE, JACKSON, CO2, BUN, CR, GLC in the last 79640 hours.    Assessment:    Tolerating radiation therapy well.  All questions and concerns addressed.        Plan:   1. Continue current therapy.        Mosaiq chart and setup information reviewed  MVCT/IGRT images checked    Medication Review  Med list reviewed with patient?: Yes  Med list printed and given: Offered and declined    Educational Topic Discussed  Education Instructions: reviewed    Carlos Alberto Osullivan MD  Radiation Oncology   [facility]  Clinic: [phone]

## 2022-04-13 ENCOUNTER — APPOINTMENT (OUTPATIENT)
Dept: RADIATION ONCOLOGY | Facility: CLINIC | Age: 52
End: 2022-04-13
Payer: COMMERCIAL

## 2022-04-13 PROCEDURE — G6013 RADIATION TREATMENT DELIVERY: HCPCS | Performed by: RADIOLOGY

## 2022-04-13 PROCEDURE — G6017 INTRAFRACTION TRACK MOTION: HCPCS | Performed by: RADIOLOGY

## 2022-04-14 ENCOUNTER — APPOINTMENT (OUTPATIENT)
Dept: RADIATION ONCOLOGY | Facility: CLINIC | Age: 52
End: 2022-04-14
Payer: COMMERCIAL

## 2022-04-14 PROCEDURE — G6013 RADIATION TREATMENT DELIVERY: HCPCS | Performed by: RADIOLOGY

## 2022-04-14 PROCEDURE — G6017 INTRAFRACTION TRACK MOTION: HCPCS | Performed by: RADIOLOGY

## 2022-04-15 ENCOUNTER — APPOINTMENT (OUTPATIENT)
Dept: RADIATION ONCOLOGY | Facility: CLINIC | Age: 52
End: 2022-04-15
Payer: COMMERCIAL

## 2022-04-15 ENCOUNTER — TELEPHONE (OUTPATIENT)
Dept: PHYSICAL THERAPY | Facility: CLINIC | Age: 52
End: 2022-04-15
Payer: COMMERCIAL

## 2022-04-15 PROCEDURE — G6017 INTRAFRACTION TRACK MOTION: HCPCS | Performed by: RADIOLOGY

## 2022-04-15 PROCEDURE — G6013 RADIATION TREATMENT DELIVERY: HCPCS | Performed by: RADIOLOGY

## 2022-04-18 ENCOUNTER — APPOINTMENT (OUTPATIENT)
Dept: RADIATION ONCOLOGY | Facility: CLINIC | Age: 52
End: 2022-04-18
Payer: COMMERCIAL

## 2022-04-18 PROCEDURE — G6017 INTRAFRACTION TRACK MOTION: HCPCS | Performed by: RADIOLOGY

## 2022-04-18 PROCEDURE — 77427 RADIATION TX MANAGEMENT X5: CPT | Performed by: RADIOLOGY

## 2022-04-18 PROCEDURE — G6013 RADIATION TREATMENT DELIVERY: HCPCS | Performed by: RADIOLOGY

## 2022-04-18 PROCEDURE — 77336 RADIATION PHYSICS CONSULT: CPT | Performed by: RADIOLOGY

## 2022-04-19 ENCOUNTER — HOSPITAL ENCOUNTER (OUTPATIENT)
Dept: PHYSICAL THERAPY | Facility: CLINIC | Age: 52
Setting detail: THERAPIES SERIES
Discharge: HOME | End: 2022-04-19
Attending: FAMILY MEDICINE
Payer: COMMERCIAL

## 2022-04-19 ENCOUNTER — APPOINTMENT (OUTPATIENT)
Dept: RADIATION ONCOLOGY | Facility: CLINIC | Age: 52
End: 2022-04-19
Payer: COMMERCIAL

## 2022-04-19 PROCEDURE — G6017 INTRAFRACTION TRACK MOTION: HCPCS | Performed by: SURGERY

## 2022-04-19 PROCEDURE — 97110 THERAPEUTIC EXERCISES: CPT | Mod: GP | Performed by: PHYSICAL THERAPIST

## 2022-04-19 PROCEDURE — G6013 RADIATION TREATMENT DELIVERY: HCPCS | Performed by: SURGERY

## 2022-04-20 ENCOUNTER — OFFICE VISIT (OUTPATIENT)
Dept: RADIATION ONCOLOGY | Facility: CLINIC | Age: 52
End: 2022-04-20
Payer: COMMERCIAL

## 2022-04-20 VITALS
HEART RATE: 63 BPM | OXYGEN SATURATION: 97 % | BODY MASS INDEX: 29.85 KG/M2 | WEIGHT: 168.5 LBS | SYSTOLIC BLOOD PRESSURE: 110 MMHG | TEMPERATURE: 99 F | RESPIRATION RATE: 18 BRPM | DIASTOLIC BLOOD PRESSURE: 73 MMHG

## 2022-04-20 DIAGNOSIS — C50.911 MALIGNANT NEOPLASM OF RIGHT BREAST IN FEMALE, ESTROGEN RECEPTOR POSITIVE, UNSPECIFIED SITE OF BREAST (H): Primary | ICD-10-CM

## 2022-04-20 DIAGNOSIS — Z17.0 MALIGNANT NEOPLASM OF RIGHT BREAST IN FEMALE, ESTROGEN RECEPTOR POSITIVE, UNSPECIFIED SITE OF BREAST (H): Primary | ICD-10-CM

## 2022-04-20 PROCEDURE — 99207 PR DROP WITH A PROCEDURE: CPT | Performed by: SURGERY

## 2022-04-20 ASSESSMENT — PAIN SCALES - GENERAL: PAINLEVEL: NO PAIN (0)

## 2022-04-20 NOTE — PATIENT INSTRUCTIONS
Please contact [facility] RN with questions or concerns following today's appointment: [phone].    Thank you!

## 2022-04-20 NOTE — PROGRESS NOTES
[facility] PHYSICIANS  SPECIALIZING IN BREAKTHROUGHS  Radiation Oncology    On Treatment Visit Note      Danielle Thrasher      Date: 2022   MRN: [id number]   : 1970  Diagnosis: breast cancer ER (+)        ID:  Ms. Thrasher is 51YF with RIGHT sided ER+/MO+/Her2- [id number] (Stage IIA) invasive lobular carcinoma status post right skin sparing mastectomy with immediate reconstruction and SLNBx (Dr. Krueger, Dr. Hernandez, 22) with pathology demonstrating 7.5 cm tumor, 1/7 LN positive (5.5mm, no ABIGAIL), negative margins, no LVSI, pT3N1a. Oncotype is 11 and she will not be receiving chemotherapy, but endocrine therapy (Dr. Huitron).      Reason for Visit:  On Radiation Treatment Visit       Treatment Summary to Date  Treatment Site: right chest wall + right SCV Current Dose: 2400/5000 cGy Fractions:       Chemotherapy  Chemo concurrent with radx?: No (Dr. Huitron)    Subjective: No complaints, tolerating treatments well. No cp, tenderness, no range of motion limitations. Stable energy levels.     Nursing ROS:   Nutrition Alteration  Diet Type: Patient's Preference  Skin  Skin Reaction: 1 - Faint erythema or dry desquamation (no desquamation)  Skin Intervention: patient reports applying Aquaphor two times daily        Cardiovascular  Respiratory effort: 1 - Normal - without distress        Psychosocial  Mood - Anxiety: 0 - Normal  Mood - Depression: 0 - Normal  Psychosocial Note: energy level at baseline  Pain Assessment  0-10 Pain Scale: 0  Pain Note: intermittent discomfort in the evening of right chest wall, reports taking Ibuprofen po as needed at night      Objective:   /73 (BP Location: Left arm, Patient Position: Chair, Cuff Size: Adult Large)   Pulse 63   Temp 99  F (37.2  C) (Oral)   Resp 18   Wt 76.4 kg (168 lb 8 oz)   SpO2 97%   BMI 29.85 kg/m    Gen: Appears well, in no acute distress  Skin: faint/mild erythema, no skin breakdown     Labs:  CBC RESULTS: No  results for input(s): WBC, RBC, HGB, HCT, MCV, MCH, MCHC, RDW, PLT in the last 51351 hours.  ELECTROLYTES:  No results for input(s): NA, POTASSIUM, CHLORIDE, JACKSON, CO2, BUN, CR, GLC in the last 45601 hours.    Assessment:    Tolerating radiation therapy well.  All questions and concerns addressed.        Plan:   1. Continue current therapy.        Mosaiq chart and setup information reviewed  MVCT/IGRT images checked    Medication Review  Med list reviewed with patient?: Yes  Med list printed and given: Offered and declined    Educational Topic Discussed  Education Instructions: reviewed    Carlos Alberto Osullivan MD  Radiation Oncology   [facility]  Clinic: [phone]

## 2022-04-20 NOTE — LETTER
2022         RE: Danielle Thrasher  [address] [place] [address]        Dear Colleague,    Thank you for referring your patient, Danielle Thrasher, to the [facility] RADIATION ONCOLOGY [place]. Please see a copy of my visit note below.    [facility] PHYSICIANS  SPECIALIZING IN BREAKTHROUGHS  Radiation Oncology    On Treatment Visit Note      Danielle Thrasher      Date: 2022   MRN: [id number]   : 1970  Diagnosis: breast cancer ER (+)        ID:  Ms. Thrasher is 51YF with RIGHT sided ER+/[place]+/Her2- [id number] (Stage IIA) invasive lobular carcinoma status post right skin sparing mastectomy with immediate reconstruction and SLNBx (Dr. Krueger, Dr. Hernandez, 22) with pathology demonstrating 7.5 cm tumor, 1/7 LN positive (5.5mm, no ABIGAIL), negative margins, no LVSI, pT3N1a. Oncotype is 11 and she will not be receiving chemotherapy, but endocrine therapy (Dr. Huitron).      Reason for Visit:  On Radiation Treatment Visit       Treatment Summary to Date  Treatment Site: right chest wall + right SCV Current Dose: 2400/5000 cGy Fractions:       Chemotherapy  Chemo concurrent with radx?: No (Dr. Huitron)    Subjective: No complaints, tolerating treatments well. No cp, tenderness, no range of motion limitations. Stable energy levels.     Nursing ROS:   Nutrition Alteration  Diet Type: Patient's Preference  Skin  Skin Reaction: 1 - Faint erythema or dry desquamation (no desquamation)  Skin Intervention: patient reports applying Aquaphor two times daily        Cardiovascular  Respiratory effort: 1 - Normal - without distress        Psychosocial  Mood - Anxiety: 0 - Normal  Mood - Depression: 0 - Normal  Psychosocial Note: energy level at baseline  Pain Assessment  0-10 Pain Scale: 0  Pain Note: intermittent discomfort in the evening of right chest wall, reports taking Ibuprofen po as needed at night      Objective:   /73 (BP Location: Left arm,  Patient Position: Chair, Cuff Size: Adult Large)   Pulse 63   Temp 99  F (37.2  C) (Oral)   Resp 18   Wt 76.4 kg (168 lb 8 oz)   SpO2 97%   BMI 29.85 kg/m    Gen: Appears well, in no acute distress  Skin: faint/mild erythema, no skin breakdown     Labs:  CBC RESULTS: No results for input(s): WBC, RBC, HGB, HCT, MCV, MCH, MCHC, RDW, PLT in the last 97312 hours.  ELECTROLYTES:  No results for input(s): NA, POTASSIUM, CHLORIDE, JACKSON, CO2, BUN, CR, GLC in the last 70010 hours.    Assessment:    Tolerating radiation therapy well.  All questions and concerns addressed.        Plan:   1. Continue current therapy.        Mosaiq chart and setup information reviewed  MVCT/IGRT images checked    Medication Review  Med list reviewed with patient?: Yes  Med list printed and given: Offered and declined    Educational Topic Discussed  Education Instructions: reviewed    Carlos Alberto Osullivan MD  Radiation Oncology   [facility]  Clinic: [phone]            Again, thank you for allowing me to participate in the care of your patient.        Sincerely,        Carlos Alberto Osullivan MD

## 2022-04-21 ENCOUNTER — VIRTUAL VISIT (OUTPATIENT)
Dept: PSYCHOLOGY | Facility: CLINIC | Age: 52
End: 2022-04-21
Payer: COMMERCIAL

## 2022-04-21 ENCOUNTER — APPOINTMENT (OUTPATIENT)
Dept: RADIATION ONCOLOGY | Facility: CLINIC | Age: 52
End: 2022-04-21
Payer: COMMERCIAL

## 2022-04-21 DIAGNOSIS — F41.1 GAD (GENERALIZED ANXIETY DISORDER): Primary | ICD-10-CM

## 2022-04-21 PROCEDURE — 77417 THER RADIOLOGY PORT IMAGE(S): CPT | Performed by: SURGERY

## 2022-04-21 PROCEDURE — G6017 INTRAFRACTION TRACK MOTION: HCPCS | Performed by: SURGERY

## 2022-04-21 PROCEDURE — G6013 RADIATION TREATMENT DELIVERY: HCPCS | Performed by: SURGERY

## 2022-04-21 PROCEDURE — 90834 PSYTX W PT 45 MINUTES: CPT | Mod: GT | Performed by: COUNSELOR

## 2022-04-21 ASSESSMENT — ANXIETY QUESTIONNAIRES
7. FEELING AFRAID AS IF SOMETHING AWFUL MIGHT HAPPEN: SEVERAL DAYS
4. TROUBLE RELAXING: SEVERAL DAYS
GAD7 TOTAL SCORE: 9
1. FEELING NERVOUS, ANXIOUS, OR ON EDGE: MORE THAN HALF THE DAYS
2. NOT BEING ABLE TO STOP OR CONTROL WORRYING: SEVERAL DAYS
3. WORRYING TOO MUCH ABOUT DIFFERENT THINGS: MORE THAN HALF THE DAYS
6. BECOMING EASILY ANNOYED OR IRRITABLE: MORE THAN HALF THE DAYS
5. BEING SO RESTLESS THAT IT IS HARD TO SIT STILL: NOT AT ALL

## 2022-04-21 ASSESSMENT — PATIENT HEALTH QUESTIONNAIRE - PHQ9: SUM OF ALL RESPONSES TO PHQ QUESTIONS 1-9: 8

## 2022-04-21 NOTE — PROGRESS NOTES
[facility]                                     Progress Note    Patient Name: Danielle Thrasher  Date: 2022         Service Type: Individual      Session Start Time: 4:30pm  Session End Time: 5:15pm     Session Length: 38-52min    Session #: 11    Attendees: Client attended alone    Service Modality:  Video Visit:      Provider verified identity through the following two step process.  Patient provided:  Patient  and Patient address    Telemedicine Visit: The patient's condition can be safely assessed and treated via synchronous audio and visual telemedicine encounter.      Reason for Telemedicine Visit: Services only offered telehealth    Originating Site (Patient Location): Patient's home    Distant Site (Provider Location): Provider Remote Setting- Home Office    Consent:  The patient/guardian has verbally consented to: the potential risks and benefits of telemedicine (video visit) versus in person care; bill my insurance or make self-payment for services provided; and responsibility for payment of non-covered services.     Patient would like the video invitation sent by:  My Chart    Mode of Communication:  Video Conference via [employer]well    As the provider I attest to compliance with applicable laws and regulations related to telemedicine.    DATA  Interactive Complexity: No  Crisis: No        Progress Since Last Session (Related to Symptoms / Goals / Homework):   Symptoms: Improving pt is working on therapeutic goals    Homework: Completed in session      Episode of Care Goals: Satisfactory progress - ACTION (Actively working towards change); Intervened by reinforcing change plan / affirming steps taken     Current / Ongoing Stressors and Concerns:    Relational stress, diagnosed with stage 2 breast cancer.              Pt reported feelings of anxiety and grief related to continuation of radiation  therapy.      Treatment Objective(s) Addressed in This Session:   Goal - Depression:  Patient will alleviate depressive symptoms and return to previous level of effective functioning. Objective #A (Client Action)  Patient will describe situations, thoughts, feelings, and actions associated with depression, their impact on functioning and attempts to resolve them. Patient will do this 4 out of 7 days of the week.   - Worked to identify triggers for depression     Objective #B  Patient will use cognitive strategies identified in therapy to challenge negative thought patterns 90% of the time.  - Worked to decrease pt's negative self talk     Objective #C  Patient will identify and use at least three coping skills and distraction and diversion activities to manage feelings of depression. Pt will use these skills at least three times per week.  - Worked to increase use of coping skills throughout the day/week.        Goal- Anxiety: Patient will reduce overall frequency, intensity, and duration of the anxiety so that daily functioning is not impaired.   Objective #A (Client Action)  Patient will describe situations, thoughts, feelings, and actions associated with anxieties and worries, their impact on functioning and attempts to resolve them. Patient will do this 4 out of 7 days of the week.   - Worked to identify triggers for anxiety     Objective #B  Patient will use cognitive strategies identified in therapy to challenge negative thought patterns 90% of the time.  - Worked to decrease pt's negative self talk     Objective #C  Patient will identify and use at least three coping skills and distraction and diversion activities to manage feelings of anxiety. Pt will use these skills at least three times per week.  - Worked to increase use of coping skills throughout the day/week.     Intervention:   Therapist met with patient to review goals and interventions. Therapist utilized reflective listening as patient gave brief reflection of the week.    Utilized CBT modality to support the patient in reframeing  cognitive distortions. Utilized emotion focused therapy to allow pt to experience emotions and encourage the healthy processing of them.     Assessments completed prior to visit:  The following assessments were completed by patient for this visit:  PHQ9:   PHQ-9 SCORE 10/8/2015 11/2/2016 11/10/2016 9/22/2021 10/11/2021 11/10/2021 4/21/2022   PHQ-9 Total Score - - - - - - -   PHQ-9 Total Score MyChart - - - - 14 (Moderate depression) - -   PHQ-9 Total Score 1 3 4 12 14 15 8     GAD7:   BRIDGET-7 SCORE 11/10/2016 5/1/2019 6/8/2020 9/22/2021 10/11/2021 11/10/2021 4/21/2022   Total Score - - - - 10 (moderate anxiety) - -   Total Score 4 2 9 10 10 10 9         ASSESSMENT: Current Emotional / Mental Status (status of significant symptoms):   Risk status (Self / Other harm or suicidal ideation)   Patient denies current fears or concerns for personal safety.   Patient denies current or recent suicidal ideation or behaviors.   Patient denies current or recent homicidal ideation or behaviors.   Patient denies current or recent self injurious behavior or ideation.   Patient denies other safety concerns.   Patient reports there has been no change in risk factors since their last session.     Patient reports there has been no change in protective factors since their last session.     Recommended that patient call 911 or go to the local ED should there be a change in any of these risk factors.     Appearance:   Appropriate    Eye Contact:   Good    Psychomotor Behavior: Normal    Attitude:   Cooperative    Orientation:   All   Speech    Rate / Production: Normal     Volume:  Normal    Mood:    Normal   Affect:    Appropriate    Thought Content:  Clear    Thought Form:  Coherent  Logical    Insight:    Good      Medication Review:    No current psychiatric medications prescribed     Medication Compliance:   Yes     Changes in Health Issues:   None reported     Chemical Use Review:   Substance Use: Chemical use reviewed, no active  concerns identified      Tobacco Use: No current tobacco use.      Diagnosis:  1. BRIDGET (generalized anxiety disorder)        Collateral Reports Completed:   Not Applicable    PLAN: (Patient Tasks / Therapist Tasks / Other)  1) pt will continue to reframe worst case scenario thoughts 3 times per week  2) Pt will continue to practice self compassion 3 times per week  3) next session scheduled for 3 weeks out        MOSHE BUTLER, [facility]                                                         ______________________________________________________________________    Individual Treatment Plan    Patient's Name: Danielle Thrasher  YOB: 1970    Date of Creation: 10/19/21  Date Treatment Plan Last Reviewed/Revised: 1/19/21, 4/21/22, next review due 7/21/22    DSM5 Diagnoses: 300.02 (F41.1) Generalized Anxiety Disorder  Psychosocial / Contextual Factors: Pt is a 51 year old female who works remote, current health issues.  PROMIS (reviewed every 90 days): will assign to pt    Referral / Collaboration:  Referral to another professional/service is not indicated at this time..    Anticipated number of session for this episode of care: 32-38  Anticipation frequency of session: Every other week  Anticipated Duration of each session: 38-52 minutes  Treatment plan will be reviewed in 90 days or when goals have been changed.       MeasurableTreatment Goal(s) related to diagnosis / functional impairment(s)  Goal - Depression: Patient will alleviate depressive symptoms and return to previous level of effective functioning.  I will know I've met my goal when I don't feel in this hole anymore.      Objective #A (Client Action)  Patient will describe situations, thoughts, feelings, and actions associated with depression, their impact on functioning and attempts to resolve them. Patient will do this 4 out of 7 days of the week.      Intervention(s)  Therapist will provide psychoeducation, behavioral activation, and cognitive  restructuring.  Status: Continued- Date: 1/19/2022, 4/21/2022        Objective #B  Patient will use cognitive strategies identified in therapy to challenge negative thought patterns 90% of the time.     Intervention(s)  Therapist will provide psychoeducation, behavioral activation, and cognitive restructuring.  Status: Continued- Date: 1/19/2022, 4/21/2022     Objective #C  Patient will identify and use at least three coping skills and distraction and diversion activities to manage feelings of depression. Pt will use these skills at least three times per week.     Intervention(s)  Therapist will provide psychoeducation, behavioral activation, and cognitive restructuring.  Status: Continued- Date: 1/19/2022, 4/21/2022        Goal- Anxiety: Patient will reduce overall frequency, intensity, and duration of the anxiety so that daily functioning is not impaired.     I will know I've met my goal when I am less anxious.       Objective #A (Client Action)  Patient will describe situations, thoughts, feelings, and actions associated with anxieties and worries, their impact on functioning and attempts to resolve them. Patient will do this 4 out of 7 days of the week.      Intervention(s)  Therapist will provide psychoeducation, behavioral activation, and cognitive restructuring.  Status: Continued- Date: 1/19/2022, 4/21/2022     Objective #B  Patient will use cognitive strategies identified in therapy to challenge negative thought patterns 90% of the time.     Intervention(s)  Therapist will provide psychoeducation, behavioral activation, and cognitive restructuring.  Status: Continued- Date: 1/19/2022, 4/21/2022     Objective #C  Patient will identify and use at least three coping skills and distraction and diversion activities to manage feelings of anxiety. Pt will use these skills at least three times per week.     Intervention(s)  Therapist will provide psychoeducation, behavioral activation, and cognitive  restructuring.  Status: Continued- Date: 1/19/2022, 4/21/2022    Patient has reviewed and agreed to the above plan.      MOSHE BUTLER, [facility]  10/26/2021, 1/19/2022, April 21, 2022

## 2022-04-22 ENCOUNTER — INFUSION THERAPY VISIT (OUTPATIENT)
Dept: INFUSION THERAPY | Facility: CLINIC | Age: 52
End: 2022-04-22
Payer: COMMERCIAL

## 2022-04-22 ENCOUNTER — ALLIED HEALTH/NURSE VISIT (OUTPATIENT)
Dept: RADIATION ONCOLOGY | Facility: CLINIC | Age: 52
End: 2022-04-22
Payer: COMMERCIAL

## 2022-04-22 VITALS
SYSTOLIC BLOOD PRESSURE: 121 MMHG | WEIGHT: 167.9 LBS | DIASTOLIC BLOOD PRESSURE: 82 MMHG | BODY MASS INDEX: 29.74 KG/M2 | OXYGEN SATURATION: 97 % | HEART RATE: 70 BPM | RESPIRATION RATE: 14 BRPM | TEMPERATURE: 98.9 F

## 2022-04-22 DIAGNOSIS — J45.20 MILD INTERMITTENT ASTHMA WITHOUT COMPLICATION: Primary | ICD-10-CM

## 2022-04-22 PROCEDURE — 91305 PR COVID VAC PFIZER TRIS-SUCROSE 30MCG/0.3ML IM: CPT | Performed by: NURSE PRACTITIONER

## 2022-04-22 PROCEDURE — 99207 PR NO CHARGE LOS: CPT

## 2022-04-22 PROCEDURE — G6013 RADIATION TREATMENT DELIVERY: HCPCS | Performed by: SURGERY

## 2022-04-22 PROCEDURE — 0054A PR ADMIN COVID VAC PFIZER TRS-SUCR, BOOSTER: CPT | Performed by: NURSE PRACTITIONER

## 2022-04-22 PROCEDURE — G6017 INTRAFRACTION TRACK MOTION: HCPCS | Performed by: SURGERY

## 2022-04-22 ASSESSMENT — ANXIETY QUESTIONNAIRES: GAD7 TOTAL SCORE: 9

## 2022-04-22 NOTE — PROGRESS NOTES
Infusion Nursing Note:  Danielle Thrasher presents today for 2nd Covid booster vaccine.    Patient seen by provider today: No   present during visit today: Not Applicable.    Note: Vaccine given in left deltoid due to breast cancer/lymph node removal on right.    Intravenous Access:  No Intravenous access/labs at this visit.    Treatment Conditions:  Consent signed and placed in basket to scan into chart.    Post Infusion Assessment:  Patient observed for 15 minutes post Pfizer Covid vaccine per protocol.       Discharge Plan:   Patient discharged in stable condition accompanied by: self.  Departure Mode: Ambulatory.      Nicole Vergara RN

## 2022-04-25 ENCOUNTER — APPOINTMENT (OUTPATIENT)
Dept: RADIATION ONCOLOGY | Facility: CLINIC | Age: 52
End: 2022-04-25
Payer: COMMERCIAL

## 2022-04-25 PROCEDURE — 77336 RADIATION PHYSICS CONSULT: CPT | Performed by: RADIOLOGY

## 2022-04-25 PROCEDURE — G6017 INTRAFRACTION TRACK MOTION: HCPCS | Performed by: RADIOLOGY

## 2022-04-25 PROCEDURE — 77427 RADIATION TX MANAGEMENT X5: CPT | Performed by: SURGERY

## 2022-04-25 PROCEDURE — G6013 RADIATION TREATMENT DELIVERY: HCPCS | Performed by: RADIOLOGY

## 2022-04-26 ENCOUNTER — APPOINTMENT (OUTPATIENT)
Dept: RADIATION ONCOLOGY | Facility: CLINIC | Age: 52
End: 2022-04-26
Payer: COMMERCIAL

## 2022-04-26 PROCEDURE — G6013 RADIATION TREATMENT DELIVERY: HCPCS | Performed by: SURGERY

## 2022-04-26 PROCEDURE — G6017 INTRAFRACTION TRACK MOTION: HCPCS | Performed by: SURGERY

## 2022-04-27 ENCOUNTER — OFFICE VISIT (OUTPATIENT)
Dept: RADIATION ONCOLOGY | Facility: CLINIC | Age: 52
End: 2022-04-27
Payer: COMMERCIAL

## 2022-04-27 VITALS
WEIGHT: 167.8 LBS | RESPIRATION RATE: 18 BRPM | OXYGEN SATURATION: 100 % | DIASTOLIC BLOOD PRESSURE: 78 MMHG | HEART RATE: 75 BPM | BODY MASS INDEX: 29.72 KG/M2 | SYSTOLIC BLOOD PRESSURE: 127 MMHG | TEMPERATURE: 98.5 F

## 2022-04-27 DIAGNOSIS — C50.511 MALIGNANT NEOPLASM OF LOWER-OUTER QUADRANT OF RIGHT FEMALE BREAST, UNSPECIFIED ESTROGEN RECEPTOR STATUS (H): Primary | ICD-10-CM

## 2022-04-27 PROCEDURE — 99207 PR DROP WITH A PROCEDURE: CPT | Performed by: SURGERY

## 2022-04-27 ASSESSMENT — PAIN SCALES - GENERAL: PAINLEVEL: NO PAIN (0)

## 2022-04-27 NOTE — LETTER
2022         RE: Danielle Thrasher  [address] [place] [address]        Dear Colleague,    Thank you for referring your patient, Danielle Thrasher, to the [facility] RADIATION ONCOLOGY [place]. Please see a copy of my visit note below.    [facility] PHYSICIANS  SPECIALIZING IN BREAKTHROUGHS  Radiation Oncology    On Treatment Visit Note      Danielle Thrasher      Date: 2022   MRN: [id number]   : 1970  Diagnosis: breast cancer ER (+)        ID: Ms. Thrasher is 51YF with RIGHT sided ER+/WY+/Her2- [id number] (Stage IIA) invasive lobular carcinoma status post right skin sparing mastectomy with immediate reconstruction and SLNBx (Dr. Krueger, Dr. Hernandez, 22) with pathology demonstrating 7.5 cm tumor, 1/7 LN positive (5.5mm, no ABIGAIL), negative margins, no LVSI, pT3N1a. Oncotype is 11 and she will not be receiving chemotherapy, but endocrine therapy (Dr. Huitron).      Reason for Visit:  On Radiation Treatment Visit       Treatment Summary to Date  Treatment Site: right chest wall + right SCV Current Dose: 3400/5000 cGy Fractions:       Chemotherapy  Chemo concurrent with radx?: No (Dr. Huitron)    Subjective:   No complaints, tolerating treatments well. No cp, tenderness, no range of motion limitations. Stable energy levels.         Nursing ROS:   Nutrition Alteration  Diet Type: Patient's Preference  Skin  Skin Reaction: 1 - Faint erythema or dry desquamation (no desquamation)  Skin Intervention: patient reports applying Aquaphor two times daily  Skin Note: dry desquamation of right axilla - patient continues applying Aquaphor two times daily at minimum        Cardiovascular  Respiratory effort: 1 - Normal - without distress        Psychosocial  Mood - Anxiety: 0 - Normal  Mood - Depression: 0 - Normal  Psychosocial Note: energy level at baseline  Pain Assessment  0-10 Pain Scale: 0  Pain Note: intermittent discomfort in the evening of right  chest wall, reports taking Ibuprofen po as needed at night      Objective:   /78 (BP Location: Left arm, Patient Position: Chair, Cuff Size: Adult Large)   Pulse 75   Temp 98.5  F (36.9  C) (Oral)   Resp 18   Wt 76.1 kg (167 lb 12.8 oz)   SpO2 100%   BMI 29.72 kg/m    Gen: Appears well, in no acute distress  Skin: faint/mild erythema, focal area of dry desquamation in axilla     Labs:  CBC RESULTS: No results for input(s): WBC, RBC, HGB, HCT, MCV, MCH, MCHC, RDW, PLT in the last 25886 hours.  ELECTROLYTES:  No results for input(s): NA, POTASSIUM, CHLORIDE, JACKSON, CO2, BUN, CR, GLC in the last 76022 hours.    Assessment:    Tolerating radiation therapy well.  All questions and concerns addressed.      Plan:   1. Continue current therapy.        Mosaiq chart and setup information reviewed  MVCT/IGRT images checked    Medication Review  Med list reviewed with patient?: Yes  Med list printed and given: Offered and declined    Educational Topic Discussed  Additional Instructions: follow-up with Dr. Huitron scheduled 5/9/2022  Education Instructions: reviewed    Carlos Alberto Osullivan MD  Radiation Oncology   [facility]  Clinic: [phone]            Again, thank you for allowing me to participate in the care of your patient.        Sincerely,        Carlos Alberto Osullivan MD

## 2022-04-27 NOTE — PROGRESS NOTES
[facility] PHYSICIANS  SPECIALIZING IN BREAKTHROUGHS  Radiation Oncology    On Treatment Visit Note      Danielle Thrasher      Date: 2022   MRN: [id number]   : 1970  Diagnosis: breast cancer ER (+)        ID: Ms. Thrasher is 51YF with RIGHT sided ER+/MA+/Her2- [id number] (Stage IIA) invasive lobular carcinoma status post right skin sparing mastectomy with immediate reconstruction and SLNBx (Dr. Krueger, Dr. Hernandez, 22) with pathology demonstrating 7.5 cm tumor, 1/7 LN positive (5.5mm, no ABIGAIL), negative margins, no LVSI, pT3N1a. Oncotype is 11 and she will not be receiving chemotherapy, but endocrine therapy (Dr. Huitron).      Reason for Visit:  On Radiation Treatment Visit       Treatment Summary to Date  Treatment Site: right chest wall + right SCV Current Dose: 3400/5000 cGy Fractions:       Chemotherapy  Chemo concurrent with radx?: No (Dr. Huitron)    Subjective:   No complaints, tolerating treatments well. No cp, tenderness, no range of motion limitations. Stable energy levels.         Nursing ROS:   Nutrition Alteration  Diet Type: Patient's Preference  Skin  Skin Reaction: 1 - Faint erythema or dry desquamation (no desquamation)  Skin Intervention: patient reports applying Aquaphor two times daily  Skin Note: dry desquamation of right axilla - patient continues applying Aquaphor two times daily at minimum        Cardiovascular  Respiratory effort: 1 - Normal - without distress        Psychosocial  Mood - Anxiety: 0 - Normal  Mood - Depression: 0 - Normal  Psychosocial Note: energy level at baseline  Pain Assessment  0-10 Pain Scale: 0  Pain Note: intermittent discomfort in the evening of right chest wall, reports taking Ibuprofen po as needed at night      Objective:   /78 (BP Location: Left arm, Patient Position: Chair, Cuff Size: Adult Large)   Pulse 75   Temp 98.5  F (36.9  C) (Oral)   Resp 18   Wt 76.1 kg (167 lb 12.8 oz)   SpO2 100%   BMI 29.72  kg/m    Gen: Appears well, in no acute distress  Skin: faint/mild erythema, focal area of dry desquamation in axilla     Labs:  CBC RESULTS: No results for input(s): WBC, RBC, HGB, HCT, MCV, MCH, MCHC, RDW, PLT in the last 29356 hours.  ELECTROLYTES:  No results for input(s): NA, POTASSIUM, CHLORIDE, JACKSON, CO2, BUN, CR, GLC in the last 74740 hours.    Assessment:    Tolerating radiation therapy well.  All questions and concerns addressed.      Plan:   1. Continue current therapy.        Mosaiq chart and setup information reviewed  MVCT/IGRT images checked    Medication Review  Med list reviewed with patient?: Yes  Med list printed and given: Offered and declined    Educational Topic Discussed  Additional Instructions: follow-up with Dr. Huitron scheduled 5/9/2022  Education Instructions: reviewed    Carlos Alberto Osullivan MD  Radiation Oncology   [facility]  Clinic: [phone]

## 2022-04-27 NOTE — PATIENT INSTRUCTIONS
Please contact [facility] RN with questions or concerns following today's appointment: [phone].    Thank you!

## 2022-04-28 ENCOUNTER — APPOINTMENT (OUTPATIENT)
Dept: RADIATION ONCOLOGY | Facility: CLINIC | Age: 52
End: 2022-04-28
Payer: COMMERCIAL

## 2022-04-28 ENCOUNTER — OFFICE VISIT (OUTPATIENT)
Dept: OBGYN | Facility: CLINIC | Age: 52
End: 2022-04-28
Payer: COMMERCIAL

## 2022-04-28 VITALS
BODY MASS INDEX: 29.88 KG/M2 | HEART RATE: 63 BPM | OXYGEN SATURATION: 99 % | SYSTOLIC BLOOD PRESSURE: 131 MMHG | WEIGHT: 168.7 LBS | DIASTOLIC BLOOD PRESSURE: 88 MMHG

## 2022-04-28 DIAGNOSIS — Z30.431 IUD CHECK UP: Primary | ICD-10-CM

## 2022-04-28 PROCEDURE — G6013 RADIATION TREATMENT DELIVERY: HCPCS | Performed by: SURGERY

## 2022-04-28 PROCEDURE — G6017 INTRAFRACTION TRACK MOTION: HCPCS | Performed by: SURGERY

## 2022-04-28 PROCEDURE — 99212 OFFICE O/P EST SF 10 MIN: CPT | Performed by: OBSTETRICS & GYNECOLOGY

## 2022-04-28 PROCEDURE — 77417 THER RADIOLOGY PORT IMAGE(S): CPT | Performed by: SURGERY

## 2022-04-28 NOTE — PROGRESS NOTES
Danielle is a 51 year old  here for IUD surveillance.  She had the ParaGard IUD inserted one month ago.  She has not had any problems with the IUD.  No unusual discharge or bleeding.  No cramping.  She does not have any apparent contraindications for continued use of the IUD.      PMH: Her past medical, surgical, and obstetric histories were reviewed and are documented in their appropriate chart areas.    ALL/Meds: Her medication and allergy histories were reviewed and are documented in their appropriate chart areas.    ROS:4 point ROS including Respiratory, CV, GI and , other than that noted in the HPI,  is negative     EXAM:  /88 (BP Location: Left arm, Cuff Size: Adult Regular)   Pulse 63   Wt 76.5 kg (168 lb 11.2 oz)   SpO2 99%   BMI 29.88 kg/m    General:  WNWD female, NAD  Alert  Oriented x 3  Gait:  Normal  Skin:  Normal skin turgor  HEENT:  NC/AT, EOMI  Abdomen:  Non-tender, non-distended.  Vulva: No external lesions, normal hair distribution, no adenopathy  BUS:  Normal, no masses noted  Urethra:  No hypermobility seen  Urethral meatus:  No masses noted  Vagina: Moist, pink, no abnormal discharge, well rugated, no lesions  Cervix: Smooth, pink, no visible lesions, no CMT, IUD strings seen  Uterus: Normal size, anteverted, non-tender, mobile  Ovaries: No mass, non-tender, mobile  Perianal:  No masses noted  Extremities:  No clubbing, no cyanosis and no edema.        ASSESSMENT:  IUD check    PLAN:  Return for routine care.       Nikita Dsouza MD

## 2022-04-29 ENCOUNTER — APPOINTMENT (OUTPATIENT)
Dept: RADIATION ONCOLOGY | Facility: CLINIC | Age: 52
End: 2022-04-29
Payer: COMMERCIAL

## 2022-04-29 PROCEDURE — G6017 INTRAFRACTION TRACK MOTION: HCPCS | Performed by: SURGERY

## 2022-04-29 PROCEDURE — G6013 RADIATION TREATMENT DELIVERY: HCPCS | Performed by: SURGERY

## 2022-05-02 ENCOUNTER — APPOINTMENT (OUTPATIENT)
Dept: RADIATION ONCOLOGY | Facility: CLINIC | Age: 52
End: 2022-05-02
Payer: COMMERCIAL

## 2022-05-02 PROCEDURE — G6017 INTRAFRACTION TRACK MOTION: HCPCS | Performed by: RADIOLOGY

## 2022-05-02 PROCEDURE — 77336 RADIATION PHYSICS CONSULT: CPT | Performed by: RADIOLOGY

## 2022-05-02 PROCEDURE — G6013 RADIATION TREATMENT DELIVERY: HCPCS | Performed by: RADIOLOGY

## 2022-05-02 PROCEDURE — 77427 RADIATION TX MANAGEMENT X5: CPT | Performed by: SURGERY

## 2022-05-03 ENCOUNTER — TELEPHONE (OUTPATIENT)
Dept: SURGERY | Facility: CLINIC | Age: 52
End: 2022-05-03

## 2022-05-03 ENCOUNTER — APPOINTMENT (OUTPATIENT)
Dept: RADIATION ONCOLOGY | Facility: CLINIC | Age: 52
End: 2022-05-03
Payer: COMMERCIAL

## 2022-05-03 DIAGNOSIS — Z12.39 BREAST CANCER SCREENING, HIGH RISK PATIENT: Primary | ICD-10-CM

## 2022-05-03 PROCEDURE — G6017 INTRAFRACTION TRACK MOTION: HCPCS | Performed by: RADIOLOGY

## 2022-05-03 PROCEDURE — G6013 RADIATION TREATMENT DELIVERY: HCPCS | Performed by: RADIOLOGY

## 2022-05-03 NOTE — TELEPHONE ENCOUNTER
Discussed Danielle's imaging plan for breast imaging with Dr. Bardales and agree with plan for mammogram in June and MRI in December for high risk screening given lobular type of breast cancer.     Yazmin Krueger MD  [facility]  [phone]

## 2022-05-04 ENCOUNTER — OFFICE VISIT (OUTPATIENT)
Dept: RADIATION ONCOLOGY | Facility: CLINIC | Age: 52
End: 2022-05-04
Payer: COMMERCIAL

## 2022-05-04 VITALS
BODY MASS INDEX: 30.36 KG/M2 | TEMPERATURE: 97.1 F | RESPIRATION RATE: 18 BRPM | DIASTOLIC BLOOD PRESSURE: 74 MMHG | SYSTOLIC BLOOD PRESSURE: 119 MMHG | HEART RATE: 63 BPM | WEIGHT: 171.4 LBS | OXYGEN SATURATION: 100 %

## 2022-05-04 DIAGNOSIS — C50.511 MALIGNANT NEOPLASM OF LOWER-OUTER QUADRANT OF RIGHT FEMALE BREAST, UNSPECIFIED ESTROGEN RECEPTOR STATUS (H): Primary | ICD-10-CM

## 2022-05-04 PROCEDURE — 99207 PR DROP WITH A PROCEDURE: CPT | Performed by: RADIOLOGY

## 2022-05-04 RX ORDER — SILVER SULFADIAZINE 10 MG/G
CREAM TOPICAL 2 TIMES DAILY
Qty: 50 G | Refills: 0 | Status: SHIPPED | OUTPATIENT
Start: 2022-05-04 | End: 2022-06-14

## 2022-05-04 ASSESSMENT — PAIN SCALES - GENERAL: PAINLEVEL: MILD PAIN (2)

## 2022-05-04 NOTE — LETTER
2022         RE: Danielle hTrasher   [address] [place] [address]        Dear Colleague,    Thank you for referring your patient, Danielle Thrasher, to the [facility] RADIATION ONCOLOGY [place]. Please see a copy of my visit note below.    [facility] PHYSICIANS  SPECIALIZING IN BREAKTHROUGHS  Radiation Oncology    On Treatment Visit Note      Danielle Thrasher      Date: 2022   MRN: [id number]   : 1970  Diagnosis: breast cancer ER (+)      Reason for Visit:  On Radiation Treatment Visit     Treatment Summary to Date  Treatment Site: right chest wall + right SCV Current Dose: 4400/5000 cGy Fractions:       Chemotherapy  Chemo concurrent with radx?: No (Dr. Huitron)    Subjective:     Tolerating treatment with expected side effects.      Nursing ROS:   Nutrition Alteration  Diet Type: Patient's Preference  Skin  Skin Reaction: 1 - Faint erythema or dry desquamation  Skin Intervention: patient reports applying Aquaphor two to three times daily, using Neosporin in right axilla  Skin Note: moderate erythema and desquamation in right axilla - reviewed continued skin cares        Cardiovascular  Respiratory effort: 1 - Normal - without distress        Psychosocial  Mood - Anxiety: 0 - Normal  Mood - Depression: 0 - Normal  Psychosocial Note: energy level at baseline  Pain Assessment  0-10 Pain Scale: 2  Pain Descriptors: Sore (right axilla)  Pain Treatment: cool packs as needed and Ibuprofen 400 mg po at night as needed      Objective:   /74 (BP Location: Left arm, Patient Position: Chair, Cuff Size: Adult Large)   Pulse 63   Temp 97.1  F (36.2  C) (Oral)   Resp 18   Wt 77.7 kg (171 lb 6.4 oz)   SpO2 100%   BMI 30.36 kg/m    Gen: Appears well, in no acute distress  Skin: Mild diffuse erythema over treatment field    Labs:  CBC RESULTS: No results for input(s): WBC, RBC, HGB, HCT, MCV, MCH, MCHC, RDW, PLT in the last 98690  hours.  ELECTROLYTES:  No results for input(s): NA, POTASSIUM, CHLORIDE, JACKSON, CO2, BUN, CR, GLC in the last 52285 hours.    Assessment:    Tolerating radiation therapy well.  All questions and concerns addressed.    Toxicities:  Pain: Grade 1: Mild pain  Dermatitis: Grade 1: Faint erythema or dry desquamation    Plan:   1. Continue current therapy.    2. Skin care per above.  3. Ibuprofen and cool packs for pain as previously directed.      Mosaiq chart and setup information reviewed  Ports checked    Medication Review  Med list reviewed with patient?: Yes  Med list printed and given: Offered and declined    Educational Topic Discussed  Additional Instructions: follow-up with Dr. Huitron scheduled 5/9/2022, scheduling will contact patient to schedule one month return with Dr. 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          Again, thank you for allowing me to participate in the care of your patient.        Sincerely,        JOHN Curiel MD

## 2022-05-04 NOTE — PROGRESS NOTES
[facility] PHYSICIANS  SPECIALIZING IN BREAKTHROUGHS  Radiation Oncology    On Treatment Visit Note      Danielle Thrasher      Date: 2022   MRN: [id number]   : 1970  Diagnosis: breast cancer ER (+)      Reason for Visit:  On Radiation Treatment Visit     Treatment Summary to Date  Treatment Site: right chest wall + right SCV Current Dose: 4400/5000 cGy Fractions:       Chemotherapy  Chemo concurrent with radx?: No (Dr. Huitron)    Subjective:     Tolerating treatment with expected side effects.      Nursing ROS:   Nutrition Alteration  Diet Type: Patient's Preference  Skin  Skin Reaction: 1 - Faint erythema or dry desquamation  Skin Intervention: patient reports applying Aquaphor two to three times daily, using Neosporin in right axilla  Skin Note: moderate erythema and desquamation in right axilla - reviewed continued skin cares        Cardiovascular  Respiratory effort: 1 - Normal - without distress        Psychosocial  Mood - Anxiety: 0 - Normal  Mood - Depression: 0 - Normal  Psychosocial Note: energy level at baseline  Pain Assessment  0-10 Pain Scale: 2  Pain Descriptors: Sore (right axilla)  Pain Treatment: cool packs as needed and Ibuprofen 400 mg po at night as needed      Objective:   /74 (BP Location: Left arm, Patient Position: Chair, Cuff Size: Adult Large)   Pulse 63   Temp 97.1  F (36.2  C) (Oral)   Resp 18   Wt 77.7 kg (171 lb 6.4 oz)   SpO2 100%   BMI 30.36 kg/m    Gen: Appears well, in no acute distress  Skin: Mild diffuse erythema over treatment field    Labs:  CBC RESULTS: No results for input(s): WBC, RBC, HGB, HCT, MCV, MCH, MCHC, RDW, PLT in the last 84053 hours.  ELECTROLYTES:  No results for input(s): NA, POTASSIUM, CHLORIDE, JACKSON, CO2, BUN, CR, GLC in the last 92476 hours.    Assessment:    Tolerating radiation therapy well.  All questions and concerns addressed.    Toxicities:  Pain: Grade 1: Mild pain  Dermatitis: Grade 1: Faint erythema or dry  desquamation    Plan:   1. Continue current therapy.    2. Skin care per above.  3. Ibuprofen and cool packs for pain as previously directed.      Mosaiq chart and setup information reviewed  Ports checked    Medication Review  Med list reviewed with patient?: Yes  Med list printed and given: Offered and declined    Educational Topic Discussed  Additional Instructions: follow-up with Dr. Huitron scheduled 5/9/2022, scheduling will contact patient to schedule one month return with Dr. 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

## 2022-05-04 NOTE — PATIENT INSTRUCTIONS
Please contact [facility] RN with questions or concerns following today's appointment: [phone].    Thank you!

## 2022-05-05 ENCOUNTER — APPOINTMENT (OUTPATIENT)
Dept: RADIATION ONCOLOGY | Facility: CLINIC | Age: 52
End: 2022-05-05
Payer: COMMERCIAL

## 2022-05-09 ENCOUNTER — ONCOLOGY VISIT (OUTPATIENT)
Dept: ONCOLOGY | Facility: CLINIC | Age: 52
End: 2022-05-09
Payer: COMMERCIAL

## 2022-05-09 ENCOUNTER — APPOINTMENT (OUTPATIENT)
Dept: RADIATION ONCOLOGY | Facility: CLINIC | Age: 52
End: 2022-05-09
Payer: COMMERCIAL

## 2022-05-09 VITALS
DIASTOLIC BLOOD PRESSURE: 81 MMHG | WEIGHT: 171.3 LBS | TEMPERATURE: 99 F | BODY MASS INDEX: 30.34 KG/M2 | OXYGEN SATURATION: 99 % | SYSTOLIC BLOOD PRESSURE: 123 MMHG | HEART RATE: 69 BPM

## 2022-05-09 DIAGNOSIS — Z17.0 MALIGNANT NEOPLASM OF LOWER-OUTER QUADRANT OF RIGHT BREAST OF FEMALE, ESTROGEN RECEPTOR POSITIVE (H): Primary | ICD-10-CM

## 2022-05-09 DIAGNOSIS — C50.511 MALIGNANT NEOPLASM OF LOWER-OUTER QUADRANT OF RIGHT BREAST OF FEMALE, ESTROGEN RECEPTOR POSITIVE (H): Primary | ICD-10-CM

## 2022-05-09 PROCEDURE — 99213 OFFICE O/P EST LOW 20 MIN: CPT | Performed by: INTERNAL MEDICINE

## 2022-05-09 PROCEDURE — G6017 INTRAFRACTION TRACK MOTION: HCPCS | Performed by: RADIOLOGY

## 2022-05-09 PROCEDURE — G6013 RADIATION TREATMENT DELIVERY: HCPCS | Performed by: RADIOLOGY

## 2022-05-09 ASSESSMENT — PAIN SCALES - GENERAL: PAINLEVEL: NO PAIN (0)

## 2022-05-09 NOTE — NURSING NOTE
"Oncology Rooming Note    May 9, 2022 3:08 PM   Danielle Thrasher is a 51 year old female who presents for:    Chief Complaint   Patient presents with     Oncology Clinic Visit     Initial Vitals: /81 (BP Location: Left arm)   Pulse 69   Temp 99  F (37.2  C) (Oral)   Wt 77.7 kg (171 lb 4.8 oz)   SpO2 99%   BMI 30.34 kg/m   Estimated body mass index is 30.34 kg/m  as calculated from the following:    Height as of 3/1/22: 1.6 m (5' 3\").    Weight as of this encounter: 77.7 kg (171 lb 4.8 oz). Body surface area is 1.86 meters squared.  No Pain (0) Comment: Data Unavailable   No LMP recorded. (Menstrual status: IUD).  Allergies reviewed: Yes  Medications reviewed: Yes    Medications: Medication refills not needed today.  Pharmacy name entered into [facility]:    [id number] [place], [place] - [address] AT [facility] & [place]  [facility] - [facility], [address]        Coleen Alonso LPN              "

## 2022-05-09 NOTE — PROGRESS NOTES
[facility] Hematology / Oncology  Progress Note  Name: Danielle Thrasher  :  1970    MRN:  [id number]    --------------------    Assessment / Plan:  Stage IIA (pT2 pN1a cM0), hormone positive, HER-2 negative, right-sided breast cancer.  # Right breast mastectomy with sentinel lymph node 2022.  # Oncotype Dx score 11; chemotherapy not indicated.  # Adjuvant radiation ongoing.    Clinically well.  Couple more radiation sessions to go.  Reviewed plans for adjuvant Tamoxifen.  Reviewed potential side effects.  Will start after trip to [place].  Recheck 4-6 weeks w/ labs and toxicity check.    Enrrique Huitron MD    --------------------    Interval History:  Danielle returns for follow up of breast cancer unaccompanied.  All in all, doing great!  Doing well from radiation standpoint.  No surgical concerns.  Looking forward to [email or web address]'s marathon.  Copper IUD in place; no menses in place.    --------------------    Physical Exam:  VS: /81 (BP Location: Left arm)   Pulse 69   Temp 99  F (37.2  C) (Oral)   Wt 77.7 kg (171 lb 4.8 oz)   SpO2 99%   BMI 30.34 kg/m    GEN: Well appearing.    Labs / Imaging / Path:  No new labs or imaging.

## 2022-05-09 NOTE — LETTER
2022         RE: Danielle Thrasher   [address]  [place] [address]        Dear Colleague,    Thank you for referring your patient, Danielle Thrasher, to the [facility]. Please see a copy of my visit note below.    [facility] Hematology / Oncology  Progress Note  Name: Danielle Thrasher  :  1970    MRN:  [id number]    --------------------    Assessment / Plan:  Stage IIA (pT2 pN1a cM0), hormone positive, HER-2 negative, right-sided breast cancer.  # Right breast mastectomy with sentinel lymph node 2022.  # Oncotype Dx score 11; chemotherapy not indicated.  # Adjuvant radiation ongoing.    Clinically well.  Couple more radiation sessions to go.  Reviewed plans for adjuvant Tamoxifen.  Reviewed potential side effects.  Will start after trip to [place].  Recheck 4-6 weeks w/ labs and toxicity check.    Enrrique Huitron MD    --------------------    Interval History:  Danielle returns for follow up of breast cancer unaccompanied.  All in all, doing great!  Doing well from radiation standpoint.  No surgical concerns.  Looking forward to [employer]'s marathon.  Copper IUD in place; no menses in place.    --------------------    Physical Exam:  VS: /81 (BP Location: Left arm)   Pulse 69   Temp 99  F (37.2  C) (Oral)   Wt 77.7 kg (171 lb 4.8 oz)   SpO2 99%   BMI 30.34 kg/m    GEN: Well appearing.    Labs / Imaging / Path:  No new labs or imaging.      Again, thank you for allowing me to participate in the care of your patient.        Sincerely,        Enrrique Huitron MD    
Yes

## 2022-05-10 ENCOUNTER — APPOINTMENT (OUTPATIENT)
Dept: RADIATION ONCOLOGY | Facility: CLINIC | Age: 52
End: 2022-05-10
Payer: COMMERCIAL

## 2022-05-10 PROCEDURE — G6017 INTRAFRACTION TRACK MOTION: HCPCS | Performed by: RADIOLOGY

## 2022-05-10 PROCEDURE — G6013 RADIATION TREATMENT DELIVERY: HCPCS | Performed by: RADIOLOGY

## 2022-05-10 PROCEDURE — 77417 THER RADIOLOGY PORT IMAGE(S): CPT | Performed by: RADIOLOGY

## 2022-05-11 ENCOUNTER — DOCUMENTATION ONLY (OUTPATIENT)
Dept: RADIATION ONCOLOGY | Facility: CLINIC | Age: 52
End: 2022-05-11
Payer: COMMERCIAL

## 2022-05-11 ENCOUNTER — APPOINTMENT (OUTPATIENT)
Dept: RADIATION ONCOLOGY | Facility: CLINIC | Age: 52
End: 2022-05-11
Payer: COMMERCIAL

## 2022-05-11 DIAGNOSIS — C50.511 MALIGNANT NEOPLASM OF LOWER-OUTER QUADRANT OF RIGHT FEMALE BREAST, UNSPECIFIED ESTROGEN RECEPTOR STATUS (H): Primary | ICD-10-CM

## 2022-05-11 PROCEDURE — 77427 RADIATION TX MANAGEMENT X5: CPT | Performed by: RADIOLOGY

## 2022-05-11 PROCEDURE — G6013 RADIATION TREATMENT DELIVERY: HCPCS | Performed by: RADIOLOGY

## 2022-05-11 PROCEDURE — G6017 INTRAFRACTION TRACK MOTION: HCPCS | Performed by: RADIOLOGY

## 2022-05-11 PROCEDURE — 99207 PR NO BILLABLE SERVICE THIS VISIT: CPT | Performed by: SURGERY

## 2022-05-11 PROCEDURE — 77336 RADIATION PHYSICS CONSULT: CPT | Performed by: RADIOLOGY

## 2022-05-12 ENCOUNTER — VIRTUAL VISIT (OUTPATIENT)
Dept: PSYCHOLOGY | Facility: CLINIC | Age: 52
End: 2022-05-12
Payer: COMMERCIAL

## 2022-05-12 DIAGNOSIS — F41.1 GAD (GENERALIZED ANXIETY DISORDER): Primary | ICD-10-CM

## 2022-05-12 PROCEDURE — 90834 PSYTX W PT 45 MINUTES: CPT | Mod: GT | Performed by: COUNSELOR

## 2022-05-12 ASSESSMENT — ANXIETY QUESTIONNAIRES
6. BECOMING EASILY ANNOYED OR IRRITABLE: MORE THAN HALF THE DAYS
1. FEELING NERVOUS, ANXIOUS, OR ON EDGE: MORE THAN HALF THE DAYS
5. BEING SO RESTLESS THAT IT IS HARD TO SIT STILL: NOT AT ALL
2. NOT BEING ABLE TO STOP OR CONTROL WORRYING: MORE THAN HALF THE DAYS
GAD7 TOTAL SCORE: 9
3. WORRYING TOO MUCH ABOUT DIFFERENT THINGS: SEVERAL DAYS
7. FEELING AFRAID AS IF SOMETHING AWFUL MIGHT HAPPEN: SEVERAL DAYS
8. IF YOU CHECKED OFF ANY PROBLEMS, HOW DIFFICULT HAVE THESE MADE IT FOR YOU TO DO YOUR WORK, TAKE CARE OF THINGS AT HOME, OR GET ALONG WITH OTHER PEOPLE?: SOMEWHAT DIFFICULT
4. TROUBLE RELAXING: SEVERAL DAYS
GAD7 TOTAL SCORE: 9
7. FEELING AFRAID AS IF SOMETHING AWFUL MIGHT HAPPEN: SEVERAL DAYS
GAD7 TOTAL SCORE: 9

## 2022-05-12 NOTE — PROGRESS NOTES
[facility]                                     Progress Note    Patient Name: Danielle Thrasher  Date: 2022         Service Type: Individual      Session Start Time: 4:30pm  Session End Time: 5:10pm     Session Length: 38-52min    Session #: 12    Attendees: Client attended alone    Service Modality:  Video Visit:      Provider verified identity through the following two step process.  Patient provided:  Patient  and Patient address    Telemedicine Visit: The patient's condition can be safely assessed and treated via synchronous audio and visual telemedicine encounter.      Reason for Telemedicine Visit: Services only offered telehealth    Originating Site (Patient Location): Patient's home    Distant Site (Provider Location): Provider Remote Setting- Home Office    Consent:  The patient/guardian has verbally consented to: the potential risks and benefits of telemedicine (video visit) versus in person care; bill my insurance or make self-payment for services provided; and responsibility for payment of non-covered services.     Patient would like the video invitation sent by:  My Chart    Mode of Communication:  Video Conference via [employer]    As the provider I attest to compliance with applicable laws and regulations related to telemedicine.    DATA  Interactive Complexity: No  Crisis: No        Progress Since Last Session (Related to Symptoms / Goals / Homework):   Symptoms: Improving pt is working on therapeutic goals    Homework: Completed in session      Episode of Care Goals: Satisfactory progress - ACTION (Actively working towards change); Intervened by reinforcing change plan / affirming steps taken     Current / Ongoing Stressors and Concerns:   Relational stress, diagnosed with stage 2 breast cancer.             Pt reported finishing radiation treatment with some fatigue issues. Expressed excitement for future events.     Treatment Objective(s) Addressed in This Session:   Goal  - Depression: Patient will alleviate depressive symptoms and return to previous level of effective functioning.   Objective #A   Patient will describe situations, thoughts, feelings, and actions associated with depression, their impact on functioning and attempts to resolve them. Patient will do this 4 out of 7 days of the week.   - Worked to identify triggers for depression     Objective #B  Patient will use cognitive strategies identified in therapy to challenge negative thought patterns 90% of the time.  - Worked to decrease pt's negative self talk     Objective #C  Patient will identify and use at least three coping skills and distraction and diversion activities to manage feelings of depression. Pt will use these skills at least three times per week.  - Worked to increase use of coping skills throughout the day/week.        Goal- Anxiety: Patient will reduce overall frequency, intensity, and duration of the anxiety so that daily functioning is not impaired.   Objective #A (Client Action)  Patient will describe situations, thoughts, feelings, and actions associated with anxieties and worries, their impact on functioning and attempts to resolve them. Patient will do this 4 out of 7 days of the week.   - Worked to identify triggers for anxiety     Objective #B  Patient will use cognitive strategies identified in therapy to challenge negative thought patterns 90% of the time.  - Worked to decrease pt's negative self talk     Objective #C  Patient will identify and use at least three coping skills and distraction and diversion activities to manage feelings of anxiety. Pt will use these skills at least three times per week.  - Worked to increase use of coping skills throughout the day/week.     Intervention:   Therapist met with patient to review goals and interventions. Therapist utilized reflective listening as patient gave brief reflection of the week.    Utilized CBT to help the patient identify pt's cognitive  distortions that increase experience of anxiety. Worked with pt to reframe thoughts to decrease anxiety.    Assessments completed prior to visit:  The following assessments were completed by patient for this visit:  PHQ9:   PHQ-9 SCORE 10/8/2015 11/2/2016 11/10/2016 9/22/2021 10/11/2021 11/10/2021 4/21/2022   PHQ-9 Total Score - - - - - - -   PHQ-9 Total Score MyChart - - - - 14 (Moderate depression) - -   PHQ-9 Total Score 1 3 4 12 14 15 8     GAD7:   BRIDGET-7 SCORE 5/1/2019 6/8/2020 9/22/2021 10/11/2021 11/10/2021 4/21/2022 5/12/2022   Total Score - - - 10 (moderate anxiety) - - 9 (mild anxiety)   Total Score 2 9 10 10 10 9 9         ASSESSMENT: Current Emotional / Mental Status (status of significant symptoms):   Risk status (Self / Other harm or suicidal ideation)   Patient denies current fears or concerns for personal safety.   Patient denies current or recent suicidal ideation or behaviors.   Patient denies current or recent homicidal ideation or behaviors.   Patient denies current or recent self injurious behavior or ideation.   Patient denies other safety concerns.   Patient reports there has been no change in risk factors since their last session.     Patient reports there has been no change in protective factors since their last session.     Recommended that patient call 911 or go to the local ED should there be a change in any of these risk factors.     Appearance:   Appropriate    Eye Contact:   Good    Psychomotor Behavior: Normal    Attitude:   Cooperative    Orientation:   All   Speech    Rate / Production: Normal     Volume:  Normal    Mood:    Normal   Affect:    Appropriate    Thought Content:  Clear    Thought Form:  Coherent  Logical    Insight:    Good      Medication Review:    No current psychiatric medications prescribed     Medication Compliance:   Yes     Changes in Health Issues:   None reported     Chemical Use Review:   Substance Use: Chemical use reviewed, no active concerns identified       Tobacco Use: No current tobacco use.      Diagnosis:  1. BRIDGET (generalized anxiety disorder)        Collateral Reports Completed:   Not Applicable    PLAN: (Patient Tasks / Therapist Tasks / Other)  1) pt will reframe anxious thoughts 3 times per week  2) Pt will voice her needs as a practice of self compassion, 3 times per week  3) next session scheduled for 4 weeks out        MOSHE BUTLER, [facility]                                                         ______________________________________________________________________    Individual Treatment Plan    Patient's Name: Danielle Thrasher  YOB: 1970    Date of Creation: 10/19/21  Date Treatment Plan Last Reviewed/Revised: 1/19/21, 4/21/22, next review due 7/21/22    DSM5 Diagnoses: 300.02 (F41.1) Generalized Anxiety Disorder  Psychosocial / Contextual Factors: Pt is a 51 year old female who works remote, current health issues.  PROMIS (reviewed every 90 days): will assign to pt    Referral / Collaboration:  Referral to another professional/service is not indicated at this time..    Anticipated number of session for this episode of care: 32-38  Anticipation frequency of session: Every other week  Anticipated Duration of each session: 38-52 minutes  Treatment plan will be reviewed in 90 days or when goals have been changed.       MeasurableTreatment Goal(s) related to diagnosis / functional impairment(s)  Goal - Depression: Patient will alleviate depressive symptoms and return to previous level of effective functioning.  I will know I've met my goal when I don't feel in this hole anymore.      Objective #A (Client Action)  Patient will describe situations, thoughts, feelings, and actions associated with depression, their impact on functioning and attempts to resolve them. Patient will do this 4 out of 7 days of the week.      Intervention(s)  Therapist will provide psychoeducation, behavioral activation, and cognitive restructuring.  Status:  Continued- Date: 1/19/2022, 4/21/2022        Objective #B  Patient will use cognitive strategies identified in therapy to challenge negative thought patterns 90% of the time.     Intervention(s)  Therapist will provide psychoeducation, behavioral activation, and cognitive restructuring.  Status: Continued- Date: 1/19/2022, 4/21/2022     Objective #C  Patient will identify and use at least three coping skills and distraction and diversion activities to manage feelings of depression. Pt will use these skills at least three times per week.     Intervention(s)  Therapist will provide psychoeducation, behavioral activation, and cognitive restructuring.  Status: Continued- Date: 1/19/2022, 4/21/2022        Goal- Anxiety: Patient will reduce overall frequency, intensity, and duration of the anxiety so that daily functioning is not impaired.     I will know I've met my goal when I am less anxious.       Objective #A (Client Action)  Patient will describe situations, thoughts, feelings, and actions associated with anxieties and worries, their impact on functioning and attempts to resolve them. Patient will do this 4 out of 7 days of the week.      Intervention(s)  Therapist will provide psychoeducation, behavioral activation, and cognitive restructuring.  Status: Continued- Date: 1/19/2022, 4/21/2022     Objective #B  Patient will use cognitive strategies identified in therapy to challenge negative thought patterns 90% of the time.     Intervention(s)  Therapist will provide psychoeducation, behavioral activation, and cognitive restructuring.  Status: Continued- Date: 1/19/2022, 4/21/2022     Objective #C  Patient will identify and use at least three coping skills and distraction and diversion activities to manage feelings of anxiety. Pt will use these skills at least three times per week.     Intervention(s)  Therapist will provide psychoeducation, behavioral activation, and cognitive restructuring.  Status: Continued-  Date: 1/19/2022, 4/21/2022    Patient has reviewed and agreed to the above plan.      MOSHE BUTLER, [facility]  10/26/2021, 1/19/2022, April 21, 2022

## 2022-05-13 ASSESSMENT — ANXIETY QUESTIONNAIRES: GAD7 TOTAL SCORE: 9

## 2022-06-02 NOTE — PROGRESS NOTES
Radiotherapy Treatment Summary              PATIENT: Danielle Thrasher  MEDICAL RECORD NO: [id number]   : 1970    DIAGNOSIS / ID:  Ms. Thrasher is 51YF with RIGHT sided ER+/IA+/Her2- [id number] (Stage IIA) invasive lobular carcinoma status post right skin sparing mastectomy with immediate reconstruction and SLNBx (Dr. Krueger, Dr. Hernandez, 22) with pathology demonstrating 7.5 cm tumor, 1/7 LN positive (5.5mm, no ABIGAIL), negative margins, no LVSI, pT3N1a. Oncotype is 11 and she will not be receiving chemotherapy, but endocrine therapy (Dr. Huitron).       INTENT OF RADIOTHERAPY: Adjuvant     CONCURRENT SYSTEMIC THERAPY: No, endocrine therapy after radiation             SITE OF TREATMENT: right CW and regional lymph nodes    DATES  OF TREATMENT: 22- 22    TOTAL DOSE OF TREATMENT / FRACTIONS: 50Gy/25fx        COMMENT / TOXICITY:  Dry desquamation                                 FOLLOW UP PLAN:  1. Follow up 1 month with radiation oncology  2. Follow up with medical oncology for endocrine therapy (Dr. Huitron)       CC  Patient Care Team:  Mehnaz Cabrera MD as PCP - General  Mehnaz Cabrera MD as Assigned PCP  Yazmin Krueger MD as Assigned Surgical Provider  Enrrique Huitron MD as MD (Hematology & Oncology)  Yazmin Krueger MD as MD (Surgery)  Carlos Alberto Osullivan MD as MD (Radiation Oncology)  Sherry Ying, RN as Specialty Care Coordinator (Radiation Oncology)  Linden Hernandez MD as MD (Plastic Surgery)  Mary Jane Lu, DEWAYNE as Specialty Care Coordinator (Hematology & Oncology)  Nikita Dsouza MD as Assigned OBGYN Provider  Deborah Geronimo PT as Physical Therapist (Physical Therapy)  Carlos Alberto Osullivan MD as Assigned Cancer Care Provider       Carlos Alberto Osullivan M.D.  Department of Radiation Oncology  [facility]

## 2022-06-03 ENCOUNTER — HOSPITAL ENCOUNTER (OUTPATIENT)
Dept: PHYSICAL THERAPY | Facility: CLINIC | Age: 52
Setting detail: THERAPIES SERIES
Discharge: HOME | End: 2022-06-03
Attending: FAMILY MEDICINE
Payer: COMMERCIAL

## 2022-06-03 PROCEDURE — 97110 THERAPEUTIC EXERCISES: CPT | Mod: GP | Performed by: PHYSICAL THERAPIST

## 2022-06-03 PROCEDURE — 97140 MANUAL THERAPY 1/> REGIONS: CPT | Mod: GP | Performed by: PHYSICAL THERAPIST

## 2022-06-03 NOTE — PROGRESS NOTES
[facility]    Outpatient Physical Therapy Discharge Note  Patient: Danielle Thrasher  : 1970    Beginning/End Dates of Reporting Period:  2022 to 2022    Referring Provider: Linden Hernandez MD    Therapy Diagnosis: At risk for lymphedema, impaired R shoulder ROM     Client Self Report: Feeling good, was able to travel and complete [place] although mostly walking. Has been keeping with strngthening, skin healing but mostly resolved    Objective Measurements:  Objective Measure: R shoulder ROM  Details: R 159; L 163  Objective Measure: Skin  Details: Significant redness within radition field with raised red papules to anterior medial chest and R shoulder superior to spine of scapula  Objective Measure: Pain  Details: itching/ burning due to skin breakdown      Goals:  Goal Identifier Shoulder ROM   Goal Description Pt will demonstrate R shoulder ROM to at least 165 degrees to allow reach to upper cabinet without pain    Target Date 22   Date Met  22   Progress (detail required for progress note): 166 degrees 22     Goal Identifier Risk Reduction   Goal Description Pt will be able to repeat 3 risk reduction strategies for R arm lymphedema.    Target Date 22   Date Met  22   Progress (detail required for progress note): MET- demonstrates good understanding of risk reduction strategies and s/s     Goal Identifier HEP   Goal Description Pt will demonstrate accurate completion of appropriately paced and targeted strengthening/ stretching program for return to PLOF post mastectomy.     Target Date 22   Date Met  22   Progress (detail required for progress note): Excellent with HEP, motivated and accurate with completion     Plan:  Discharge from therapy.    Discharge:    Reason for Discharge: Patient has met all goals.    Equipment Issued: HEP, compression  (Bra and sleeve)    Discharge Plan: Patient to continue home program.

## 2022-06-09 ENCOUNTER — VIRTUAL VISIT (OUTPATIENT)
Dept: PSYCHOLOGY | Facility: CLINIC | Age: 52
End: 2022-06-09
Payer: COMMERCIAL

## 2022-06-09 DIAGNOSIS — F41.1 GAD (GENERALIZED ANXIETY DISORDER): Primary | ICD-10-CM

## 2022-06-09 PROCEDURE — 90834 PSYTX W PT 45 MINUTES: CPT | Mod: GT | Performed by: COUNSELOR

## 2022-06-09 NOTE — PROGRESS NOTES
[facility]                                     Progress Note    Patient Name: Danielle Thrasher  Date: 2022         Service Type: Individual      Session Start Time: 2:30pm  Session End Time: 3:15pm     Session Length: 38-52min    Session #: 14    Attendees: Client attended alone    Service Modality:  Video Visit:      Provider verified identity through the following two step process.  Patient provided:  Patient  and Patient address    Telemedicine Visit: The patient's condition can be safely assessed and treated via synchronous audio and visual telemedicine encounter.      Reason for Telemedicine Visit: Services only offered telehealth    Originating Site (Patient Location): Patient's home    Distant Site (Provider Location): Provider Remote Setting- Home Office    Consent:  The patient/guardian has verbally consented to: the potential risks and benefits of telemedicine (video visit) versus in person care; bill my insurance or make self-payment for services provided; and responsibility for payment of non-covered services.     Patient would like the video invitation sent by:  My Chart    Mode of Communication:  Video Conference via Amwell    As the provider I attest to compliance with applicable laws and regulations related to telemedicine.    DATA  Interactive Complexity: No  Crisis: No        Progress Since Last Session (Related to Symptoms / Goals / Homework):   Symptoms: Improving pt is working on therapeutic goals    Homework: Completed in session      Episode of Care Goals: Satisfactory progress - ACTION (Actively working towards change); Intervened by reinforcing change plan / affirming steps taken     Current / Ongoing Stressors and Concerns:   Relational stress, diagnosed with stage 2 breast cancer.  Pt reported returning from trip and has some side effects of the radiation.     Treatment Objective(s) Addressed in This Session:   Goal - Depression: Patient will alleviate  depressive symptoms and return to previous level of effective functioning.   Objective #A   Patient will describe situations, thoughts, feelings, and actions associated with depression, their impact on functioning and attempts to resolve them. Patient will do this 4 out of 7 days of the week.   - Worked to identify triggers for depression     Objective #B  Patient will use cognitive strategies identified in therapy to challenge negative thought patterns 90% of the time.  - Worked to decrease pt's negative self talk     Objective #C  Patient will identify and use at least three coping skills and distraction and diversion activities to manage feelings of depression. Pt will use these skills at least three times per week.  - Worked to increase use of coping skills throughout the day/week.        Goal- Anxiety: Patient will reduce overall frequency, intensity, and duration of the anxiety so that daily functioning is not impaired.   Objective #A (Client Action)  Patient will describe situations, thoughts, feelings, and actions associated with anxieties and worries, their impact on functioning and attempts to resolve them. Patient will do this 4 out of 7 days of the week.   - Worked to identify triggers for anxiety     Objective #B  Patient will use cognitive strategies identified in therapy to challenge negative thought patterns 90% of the time.  - Worked to decrease pt's negative self talk     Objective #C  Patient will identify and use at least three coping skills and distraction and diversion activities to manage feelings of anxiety. Pt will use these skills at least three times per week.  - Worked to increase use of coping skills throughout the day/week.     Intervention:   Therapist met with patient to review goals and interventions. Therapist utilized reflective listening as patient gave brief reflection of the week.    Utilized CBT modality to identify cognitive distortions that lead to feelings of anxiety and  reframe to a more balanced way of thinking about the situation. Utilized the importance of mindfulness as a coping tool for pt's emotions.    Assessments completed prior to visit:  The following assessments were completed by patient for this visit:  PHQ9:   PHQ-9 SCORE 10/8/2015 11/2/2016 11/10/2016 9/22/2021 10/11/2021 11/10/2021 4/21/2022   PHQ-9 Total Score - - - - - - -   PHQ-9 Total Score MyChart - - - - 14 (Moderate depression) - -   PHQ-9 Total Score 1 3 4 12 14 15 8     GAD7:   BRIDGET-7 SCORE 5/1/2019 6/8/2020 9/22/2021 10/11/2021 11/10/2021 4/21/2022 5/12/2022   Total Score - - - 10 (moderate anxiety) - - 9 (mild anxiety)   Total Score 2 9 10 10 10 9 9         ASSESSMENT: Current Emotional / Mental Status (status of significant symptoms):   Risk status (Self / Other harm or suicidal ideation)   Patient denies current fears or concerns for personal safety.   Patient denies current or recent suicidal ideation or behaviors.   Patient denies current or recent homicidal ideation or behaviors.   Patient denies current or recent self injurious behavior or ideation.   Patient denies other safety concerns.   Patient reports there has been no change in risk factors since their last session.     Patient reports there has been no change in protective factors since their last session.     Recommended that patient call 911 or go to the local ED should there be a change in any of these risk factors.     Appearance:   Appropriate    Eye Contact:   Good    Psychomotor Behavior: Normal    Attitude:   Cooperative    Orientation:   All   Speech    Rate / Production: Normal     Volume:  Normal    Mood:    Normal   Affect:    Appropriate    Thought Content:  Clear    Thought Form:  Coherent  Logical    Insight:    Good      Medication Review:    No current psychiatric medications prescribed     Medication Compliance:   Yes     Changes in Health Issues:   None reported     Chemical Use Review:   Substance Use: Chemical use reviewed,  no active concerns identified      Tobacco Use: No current tobacco use.      Diagnosis:  1. BRIDGET (generalized anxiety disorder)        Collateral Reports Completed:   Not Applicable    PLAN: (Patient Tasks / Therapist Tasks / Other)  1) pt will reframe cognitive distortions 3 times per week  2) Pt will utilize mindfulness daily   3) next session scheduled for 4 weeks out        MOSHE BUTLER, [facility]                                                         ______________________________________________________________________    Individual Treatment Plan    Patient's Name: Danielle Thrasher  YOB: 1970    Date of Creation: 10/19/21  Date Treatment Plan Last Reviewed/Revised: 1/19/21, 4/21/22, next review due 7/21/22    DSM5 Diagnoses: 300.02 (F41.1) Generalized Anxiety Disorder  Psychosocial / Contextual Factors: Pt is a 51 year old female who works remote, current health issues.  PROMIS (reviewed every 90 days): will assign to pt    Referral / Collaboration:  Referral to another professional/service is not indicated at this time..    Anticipated number of session for this episode of care: 32-38  Anticipation frequency of session: Every other week  Anticipated Duration of each session: 38-52 minutes  Treatment plan will be reviewed in 90 days or when goals have been changed.       MeasurableTreatment Goal(s) related to diagnosis / functional impairment(s)  Goal - Depression: Patient will alleviate depressive symptoms and return to previous level of effective functioning.  I will know I've met my goal when I don't feel in this hole anymore.      Objective #A (Client Action)  Patient will describe situations, thoughts, feelings, and actions associated with depression, their impact on functioning and attempts to resolve them. Patient will do this 4 out of 7 days of the week.      Intervention(s)  Therapist will provide psychoeducation, behavioral activation, and cognitive restructuring.  Status: Continued-  Date: 1/19/2022, 4/21/2022        Objective #B  Patient will use cognitive strategies identified in therapy to challenge negative thought patterns 90% of the time.     Intervention(s)  Therapist will provide psychoeducation, behavioral activation, and cognitive restructuring.  Status: Continued- Date: 1/19/2022, 4/21/2022     Objective #C  Patient will identify and use at least three coping skills and distraction and diversion activities to manage feelings of depression. Pt will use these skills at least three times per week.     Intervention(s)  Therapist will provide psychoeducation, behavioral activation, and cognitive restructuring.  Status: Continued- Date: 1/19/2022, 4/21/2022        Goal- Anxiety: Patient will reduce overall frequency, intensity, and duration of the anxiety so that daily functioning is not impaired.     I will know I've met my goal when I am less anxious.       Objective #A (Client Action)  Patient will describe situations, thoughts, feelings, and actions associated with anxieties and worries, their impact on functioning and attempts to resolve them. Patient will do this 4 out of 7 days of the week.      Intervention(s)  Therapist will provide psychoeducation, behavioral activation, and cognitive restructuring.  Status: Continued- Date: 1/19/2022, 4/21/2022     Objective #B  Patient will use cognitive strategies identified in therapy to challenge negative thought patterns 90% of the time.     Intervention(s)  Therapist will provide psychoeducation, behavioral activation, and cognitive restructuring.  Status: Continued- Date: 1/19/2022, 4/21/2022     Objective #C  Patient will identify and use at least three coping skills and distraction and diversion activities to manage feelings of anxiety. Pt will use these skills at least three times per week.     Intervention(s)  Therapist will provide psychoeducation, behavioral activation, and cognitive restructuring.  Status: Continued- Date:  1/19/2022, 4/21/2022    Patient has reviewed and agreed to the above plan.      MOSHE BUTLER, [facility]  10/26/2021, 1/19/2022, April 21, 2022

## 2022-06-14 ENCOUNTER — OFFICE VISIT (OUTPATIENT)
Dept: RADIATION ONCOLOGY | Facility: CLINIC | Age: 52
End: 2022-06-14

## 2022-06-14 VITALS
TEMPERATURE: 98.3 F | HEART RATE: 64 BPM | SYSTOLIC BLOOD PRESSURE: 125 MMHG | RESPIRATION RATE: 18 BRPM | OXYGEN SATURATION: 100 % | DIASTOLIC BLOOD PRESSURE: 80 MMHG

## 2022-06-14 DIAGNOSIS — C50.911 MALIGNANT NEOPLASM OF RIGHT BREAST IN FEMALE, ESTROGEN RECEPTOR POSITIVE, UNSPECIFIED SITE OF BREAST (H): Primary | ICD-10-CM

## 2022-06-14 DIAGNOSIS — Z17.0 MALIGNANT NEOPLASM OF RIGHT BREAST IN FEMALE, ESTROGEN RECEPTOR POSITIVE, UNSPECIFIED SITE OF BREAST (H): Primary | ICD-10-CM

## 2022-06-14 PROCEDURE — 99214 OFFICE O/P EST MOD 30 MIN: CPT | Performed by: SURGERY

## 2022-06-14 ASSESSMENT — PAIN SCALES - GENERAL: PAINLEVEL: NO PAIN (0)

## 2022-06-14 NOTE — NURSING NOTE
RADIATION ONCOLOGY BREAST FOLLOW-UP VISIT    Patient Name: Danielle Thrasher      : 1970     Age: 51 year old        ______________________________________________________________________________       Chief Complaint   Patient presents with     Cancer     Radiation oncology return visit with Dr. Carlos Alberto Osullivan     /80 (BP Location: Left arm, Patient Position: Chair, Cuff Size: Adult Large)   Pulse 64   Temp 98.3  F (36.8  C) (Oral)   Resp 18   SpO2 100%       History of Radiation Treatment:  Site: right chest wall + right SC  Total Dose: 5,000 cGy  Date Completed: 2022  Clinic: [facility]  Physician: Dr. Carlos Alberto Osullivan      Pain:  Denies    Labs:  Other Labs: No    Imaging:  None    Fatigue:   Grade 0: No toxicity    Skin:  No Concerns  Lotions/Creams: patient reports she will continue using Aquaphor per Dr. Hernandez's recommendation for plastic surgery post radiation treatment    Range of Motion:   No Concerns    Lymphedema:  No Concerns  Referral Needed: No      Medical Oncologist: Dr. Huitron    Endocrine Therapy: patient reports taking Tamoxifen as prescribed by Dr. Huitron      Future Appointments:      Left Mammogram Appointment Date: 2022   Dr. Huitron Appointment Date: 2022   Dr. Hernandez Appointment Date: patient reports 2022          Nurse face-to-face time: Level 5:  over 15 min face to face time.    Sherry Deras, RN BSN OCN CBCN

## 2022-06-14 NOTE — PROGRESS NOTES
Department of Radiation Oncology  [facility]    Health: Cancer Center  [facility]  [address] [place] [address] [phone]       Radiation Oncology Follow-up Visit  2022      Danielle Thrasher  MRN: [id number]   : 1970     DIAGNOSIS / ID:  Ms. Thrasher is 51YF with RIGHT sided ER+/MA+/Her2- cU0C2P5 (Stage IIA) invasive lobular carcinoma status post right skin sparing mastectomy with immediate reconstruction and SLNBx (Dr. Krueger, Dr. Hernandez, 22) with pathology demonstrating 7.5 cm tumor, 1/7 LN positive (5.5mm, no ABIGAIL), negative margins, no LVSI, pT3N1a. Oncotype is 11 and she will not be receiving chemotherapy, but endocrine therapy (Dr. Huitron).       INTENT OF RADIOTHERAPY: Adjuvant      CONCURRENT SYSTEMIC THERAPY: No, endocrine therapy after radiation              SITE OF TREATMENT: right CW and regional lymph nodes     DATES  OF TREATMENT: 22- 22     TOTAL DOSE OF TREATMENT / FRACTIONS: 50Gy/25fx     INTERVAL SINCE COMPLETION OF RADIATION THERAPY: 1 month     SUBJECTIVE:    Overall, patient is doing well since completing radiation. Denies any significant chest pain, chest tenderness, range of motion difficulties, or signs of arm/hand swelling. She is on endocrine therapy per Dr. Huitron and is tolerating this well. She has also seen the lymphedema team and is following with the exercises.       PHYSICAL EXAM:  /80 (BP Location: Left arm, Patient Position: Chair, Cuff Size: Adult Large)   Pulse 64   Temp 98.3  F (36.8  C) (Oral)   Resp 18   SpO2 100%   Gen: Alert, in NAD  Eyes: PERRL, EOMI, sclera anicteric  HENT     Head: NC/AT     Ears: No external auricular lesions     Nose/sinus: No rhinorrhea or epistaxis     Oral Cavity/Oropharynx: MMM  Neck: Supple, full ROM, no LAD  Breast: RIGHT CW (expander in place) healing well, incisions CDI, no skin breakdown, resolving erythema   Pulm: No wheezing,  stridor or respiratory distress  CV: Well-perfused, no cyanosis, no pedal edema  Abdominal: Soft, nontender, nondistended, no hepatomegaly  Back: No step-offs or pain to palpation along the thoracolumbar spine, no CVA tenderness  Musculoskeletal: Normal bulk and tone   Skin: Normal color and turgor  Neurologic: A/Ox3, CN II-XII intact  Psychiatric: Appropriate mood and affect    LABS AND IMAGING:  Reviewed.    IMPRESSION: Patient is doing well from a radiation toxicity perspective and has healed well since completing radiation.     PLAN:   1. Follow up with radiation oncology on an as needed basis  2. Follow up with medical oncology for endocrine therapy, mammograms per medical oncology (Dr. Huitron 6/20/22)  3. She also has an appointment with plastic surgery (Dr. Hernandez) in the near future     Carlos Alberto Osullivan M.D.  Department of Radiation Oncology  [facility]

## 2022-06-14 NOTE — LETTER
2022         RE: Danielle Thrasher   [address] [place] [facility] [address]        Dear Colleague,    Thank you for referring your patient, Danielle Thrasher, to the [facility] RADIATION ONCOLOGY [place]. Please see a copy of my visit note below.         Department of Radiation Oncology  [facility]: Cancer Center  [facility]  [address] [address] [place] [address] [phone]       Radiation Oncology Follow-up Visit  2022      Danielle Thrasher  MRN: [id number]   : 1970     DIAGNOSIS / ID:  Ms. Thrasher is 51YF with RIGHT sided ER+/DE+/Her2- cE1H2A1 (Stage IIA) invasive lobular carcinoma status post right skin sparing mastectomy with immediate reconstruction and SLNBx (Dr. Krueger, Dr. Hernandez, 22) with pathology demonstrating 7.5 cm tumor, 1/7 LN positive (5.5mm, no ABIGAIL), negative margins, no LVSI, pT3N1a. Oncotype is 11 and she will not be receiving chemotherapy, but endocrine therapy (Dr. Huitron).       INTENT OF RADIOTHERAPY: Adjuvant      CONCURRENT SYSTEMIC THERAPY: No, endocrine therapy after radiation              SITE OF TREATMENT: right CW and regional lymph nodes     DATES  OF TREATMENT: 22- 22     TOTAL DOSE OF TREATMENT / FRACTIONS: 50Gy/25fx     INTERVAL SINCE COMPLETION OF RADIATION THERAPY: 1 month     SUBJECTIVE:    Overall, patient is doing well since completing radiation. Denies any significant chest pain, chest tenderness, range of motion difficulties, or signs of arm/hand swelling. She is on endocrine therapy per Dr. Huitron and is tolerating this well. She has also seen the lymphedema team and is following with the exercises.       PHYSICAL EXAM:  /80 (BP Location: Left arm, Patient Position: Chair, Cuff Size: Adult Large)   Pulse 64   Temp 98.3  F (36.8  C) (Oral)   Resp 18   SpO2 100%   Gen: Alert, in NAD  Eyes: PERRL, EOMI, sclera anicteric  HENT     Head:  NC/AT     Ears: No external auricular lesions     Nose/sinus: No rhinorrhea or epistaxis     Oral Cavity/Oropharynx: MMM  Neck: Supple, full ROM, no LAD  Breast: RIGHT CW (expander in place) healing well, incisions CDI, no skin breakdown, resolving erythema   Pulm: No wheezing, stridor or respiratory distress  CV: Well-perfused, no cyanosis, no pedal edema  Abdominal: Soft, nontender, nondistended, no hepatomegaly  Back: No step-offs or pain to palpation along the thoracolumbar spine, no CVA tenderness  Musculoskeletal: Normal bulk and tone   Skin: Normal color and turgor  Neurologic: A/Ox3, CN II-XII intact  Psychiatric: Appropriate mood and affect    LABS AND IMAGING:  Reviewed.    IMPRESSION: Patient is doing well from a radiation toxicity perspective and has healed well since completing radiation.     PLAN:   1. Follow up with radiation oncology on an as needed basis  2. Follow up with medical oncology for endocrine therapy, mammograms per medical oncology (Dr. Huitron 6/20/22)  3. She also has an appointment with plastic surgery (Dr. Hernandez) in the near future     Carlos Alberto Osullivan M.D.  Department of Radiation Oncology  [facility]             Again, thank you for allowing me to participate in the care of your patient.        Sincerely,        Carlos Alberto Osullivan MD

## 2022-06-14 NOTE — PATIENT INSTRUCTIONS
Please contact [facility] RN with questions or concerns following today's appointment: [phone].    Thank you!

## 2022-06-28 ENCOUNTER — LAB (OUTPATIENT)
Dept: LAB | Facility: CLINIC | Age: 52
End: 2022-06-28
Payer: COMMERCIAL

## 2022-06-28 ENCOUNTER — ANCILLARY PROCEDURE (OUTPATIENT)
Dept: MAMMOGRAPHY | Facility: CLINIC | Age: 52
End: 2022-06-28
Attending: SURGERY
Payer: COMMERCIAL

## 2022-06-28 ENCOUNTER — TELEPHONE (OUTPATIENT)
Dept: ONCOLOGY | Facility: CLINIC | Age: 52
End: 2022-06-28

## 2022-06-28 DIAGNOSIS — Z11.59 NEED FOR HEPATITIS C SCREENING TEST: ICD-10-CM

## 2022-06-28 DIAGNOSIS — Z17.0 MALIGNANT NEOPLASM OF LOWER-OUTER QUADRANT OF RIGHT BREAST OF FEMALE, ESTROGEN RECEPTOR POSITIVE (H): ICD-10-CM

## 2022-06-28 DIAGNOSIS — C50.511 MALIGNANT NEOPLASM OF LOWER-OUTER QUADRANT OF RIGHT BREAST OF FEMALE, ESTROGEN RECEPTOR POSITIVE (H): ICD-10-CM

## 2022-06-28 LAB
ALT SERPL W P-5'-P-CCNC: 27 U/L (ref 0–50)
CHOLEST SERPL-MCNC: 121 MG/DL
FASTING STATUS PATIENT QL REPORTED: NO
HDLC SERPL-MCNC: 60 MG/DL
LDLC SERPL CALC-MCNC: 42 MG/DL
NONHDLC SERPL-MCNC: 61 MG/DL
TRIGL SERPL-MCNC: 97 MG/DL

## 2022-06-28 PROCEDURE — 84460 ALANINE AMINO (ALT) (SGPT): CPT

## 2022-06-28 PROCEDURE — 77067 SCR MAMMO BI INCL CAD: CPT | Performed by: STUDENT IN AN ORGANIZED HEALTH CARE EDUCATION/TRAINING PROGRAM

## 2022-06-28 PROCEDURE — 80061 LIPID PANEL: CPT

## 2022-06-28 PROCEDURE — 86803 HEPATITIS C AB TEST: CPT

## 2022-06-28 PROCEDURE — 77063 BREAST TOMOSYNTHESIS BI: CPT | Performed by: STUDENT IN AN ORGANIZED HEALTH CARE EDUCATION/TRAINING PROGRAM

## 2022-06-28 PROCEDURE — 36415 COLL VENOUS BLD VENIPUNCTURE: CPT

## 2022-06-28 NOTE — TELEPHONE ENCOUNTER
Patient requesting an order for a Covid test now.    In clinic for routine mammo and lab prior to Dr Huitron visit tomorrow.    States she found out last evening she was exposed to a friend on 6/25/22 whom has since tested positive for Covid.    Patient is fully vaccinated and asymptomatic.    Encouraged patient to get tested at an [facility] to [facility] site or a retail pharmacy.    Patient verbalized understanding and agreement with this plan.    Nicole Vergara RN on 6/28/2022 at 12:15 PM

## 2022-06-29 ENCOUNTER — VIRTUAL VISIT (OUTPATIENT)
Dept: ONCOLOGY | Facility: CLINIC | Age: 52
End: 2022-06-29
Payer: COMMERCIAL

## 2022-06-29 DIAGNOSIS — Z17.0 MALIGNANT NEOPLASM OF LOWER-OUTER QUADRANT OF RIGHT BREAST OF FEMALE, ESTROGEN RECEPTOR POSITIVE (H): Primary | ICD-10-CM

## 2022-06-29 DIAGNOSIS — E78.5 DYSLIPIDEMIA: ICD-10-CM

## 2022-06-29 DIAGNOSIS — C50.511 MALIGNANT NEOPLASM OF LOWER-OUTER QUADRANT OF RIGHT BREAST OF FEMALE, ESTROGEN RECEPTOR POSITIVE (H): Primary | ICD-10-CM

## 2022-06-29 LAB — HCV AB SERPL QL IA: NONREACTIVE

## 2022-06-29 PROCEDURE — 99214 OFFICE O/P EST MOD 30 MIN: CPT | Mod: GT | Performed by: INTERNAL MEDICINE

## 2022-06-29 NOTE — RESULT ENCOUNTER NOTE
Your results are normal.  Your final test results are pending.  Please check your chart again within 2 - 3 days. You will receive further instruction when your full test result panel is complete.     Mehnaz Cabrera MD

## 2022-06-29 NOTE — Clinical Note
"    6/29/2022         RE: Danielle Thrasher   [address] [facility] [address]        Dear Colleague,    Thank you for referring your patient, Danielle Thrasher, to the [facility]. Please see a copy of my visit note below.    Pt states there are no changes to their allergy and medication list since last reviewed on 6/14/2022.    Danielle is a 52 year old who is being evaluated via a billable video visit.      How would you like to obtain your AVS? MyChart  If the video visit is dropped, the invitation should be resent by: Text to cell phone: [phone]  Will anyone else be joining your video visit? No  {If patient encounters technical issues they should call [phone] :[id number]}     Kristan ANDREA      Video-Visit Details    Video Start Time: {video visit start/end time for provider to select:323111}    Type of service:  Video Visit    Video End Time:{video visit start/end time for provider to select:286175}    Originating Location (pt. Location): {video visit patient location:992632::\"Home\"}    Distant Location (provider location):  [facility]     Platform used for Video Visit: {Virtual Visit Platforms:304754::\"[employer]"}      Again, thank you for allowing me to participate in the care of your patient.        Sincerely,        Enrrique Huitron MD  "

## 2022-06-29 NOTE — PROGRESS NOTES
[facility] Hematology / Oncology  Progress Note  Name: Danielle Thrasher  :  1970    MRN:  [id number]    --------------------    Assessment / Plan:  Stage IIA (pT2 pN1a cM0), hormone positive, HER-2 negative, right-sided breast cancer.  # Right breast mastectomy with sentinel lymph node 2022.  # Oncotype Dx score 11; chemotherapy not indicated.  # Adjuvant right chest wall and region antonino radiation 50 Gy over 25 frxn Apr - May 2022.  # Adjuvant Tamoxifen May 2022 - ongoing.    Tolerating Tamoxifen well.  Reviewed normal ALT and lipid panel.  Annual left breast mammogram.  Needs annual GYN exam on Tamoxifen.  RTC 3-6 months.    Enrrique Huitron MD    --------------------    Interval History:  Danielle returns for follow up of breast cancer unaccompanied.  All in all, doing great!  Doing well from radiation standpoint.  No surgical concerns.  Planning some races this summer.      --------------------    Physical Exam:  Video visit.    Labs / Imaging / Path:  Reviewed CMP, lipid panel.

## 2022-07-01 ENCOUNTER — LAB (OUTPATIENT)
Dept: LAB | Facility: CLINIC | Age: 52
End: 2022-07-01
Attending: FAMILY MEDICINE
Payer: COMMERCIAL

## 2022-07-01 DIAGNOSIS — Z20.822 CLOSE EXPOSURE TO 2019 NOVEL CORONAVIRUS: ICD-10-CM

## 2022-07-01 PROCEDURE — U0005 INFEC AGEN DETEC AMPLI PROBE: HCPCS

## 2022-07-01 PROCEDURE — U0003 INFECTIOUS AGENT DETECTION BY NUCLEIC ACID (DNA OR RNA); SEVERE ACUTE RESPIRATORY SYNDROME CORONAVIRUS 2 (SARS-COV-2) (CORONAVIRUS DISEASE [COVID-19]), AMPLIFIED PROBE TECHNIQUE, MAKING USE OF HIGH THROUGHPUT TECHNOLOGIES AS DESCRIBED BY CMS-2020-01-R: HCPCS

## 2022-07-02 LAB — SARS-COV-2 RNA RESP QL NAA+PROBE: NEGATIVE

## 2022-08-22 NOTE — PATIENT INSTRUCTIONS
1) AURELIANO October (no labs or imaging; Tamoxifen check).  2) B[facility] MG Feb 2023 w/ mammogram and lipid panel.    Enrrique Huitron MD.

## 2022-10-17 DIAGNOSIS — F41.1 GAD (GENERALIZED ANXIETY DISORDER): ICD-10-CM

## 2022-10-18 RX ORDER — CITALOPRAM HYDROBROMIDE 40 MG/1
TABLET ORAL
Qty: 90 TABLET | Refills: 0 | Status: SHIPPED | OUTPATIENT
Start: 2022-10-18 | End: 2022-11-23

## 2022-10-20 ENCOUNTER — ONCOLOGY VISIT (OUTPATIENT)
Dept: ONCOLOGY | Facility: CLINIC | Age: 52
End: 2022-10-20
Payer: COMMERCIAL

## 2022-10-20 VITALS
HEART RATE: 80 BPM | BODY MASS INDEX: 31.07 KG/M2 | SYSTOLIC BLOOD PRESSURE: 123 MMHG | DIASTOLIC BLOOD PRESSURE: 76 MMHG | OXYGEN SATURATION: 97 % | WEIGHT: 175.4 LBS

## 2022-10-20 DIAGNOSIS — Z79.810 LONG-TERM CURRENT USE OF TAMOXIFEN: ICD-10-CM

## 2022-10-20 DIAGNOSIS — Z17.0 MALIGNANT NEOPLASM OF LOWER-OUTER QUADRANT OF RIGHT BREAST OF FEMALE, ESTROGEN RECEPTOR POSITIVE (H): Primary | ICD-10-CM

## 2022-10-20 DIAGNOSIS — C50.511 MALIGNANT NEOPLASM OF LOWER-OUTER QUADRANT OF RIGHT BREAST OF FEMALE, ESTROGEN RECEPTOR POSITIVE (H): Primary | ICD-10-CM

## 2022-10-20 PROCEDURE — 90471 IMMUNIZATION ADMIN: CPT | Performed by: NURSE PRACTITIONER

## 2022-10-20 PROCEDURE — 90682 RIV4 VACC RECOMBINANT DNA IM: CPT | Performed by: NURSE PRACTITIONER

## 2022-10-20 PROCEDURE — 91312 PR COVID VAC PFIZER BIVAL 30 MCG/0.3 ML IM: CPT | Performed by: NURSE PRACTITIONER

## 2022-10-20 PROCEDURE — 99214 OFFICE O/P EST MOD 30 MIN: CPT | Mod: 25 | Performed by: NURSE PRACTITIONER

## 2022-10-20 PROCEDURE — 0124A PR ADMIN COVID VAC PFIZER 12+ BIVAL ADDITIONAL: CPT | Performed by: NURSE PRACTITIONER

## 2022-10-20 ASSESSMENT — PAIN SCALES - GENERAL: PAINLEVEL: MILD PAIN (2)

## 2022-10-20 NOTE — NURSING NOTE
"Oncology Rooming Note    October 20, 2022 11:00 AM   Danielle Thrasher is a 52 year old female who presents for:    Chief Complaint   Patient presents with     Oncology Clinic Visit     Initial Vitals: /76 (BP Location: Left arm, Patient Position: Sitting)   Pulse 80   Wt 79.6 kg (175 lb 6.4 oz)   SpO2 97%   BMI 31.07 kg/m   Estimated body mass index is 31.07 kg/m  as calculated from the following:    Height as of 3/1/22: 1.6 m (5' 3\").    Weight as of this encounter: 79.6 kg (175 lb 6.4 oz). Body surface area is 1.88 meters squared.  Mild Pain (2) Comment: Data Unavailable   No LMP recorded. (Menstrual status: IUD).  Allergies reviewed: Yes  Medications reviewed: Yes    Medications: Medication refills not needed today.  Pharmacy name entered into [facility]:    [id number] [place], [place] - [address] AT [facility] & [address]  [facility] - [facility], [address]        Coleen Alonso LPN              "

## 2022-10-20 NOTE — PROGRESS NOTES
Hematology/Oncology Visit    Oncologist: Dr. Huitron  Diagnosis: stage IIA hormone positive R-sided invasive lobular carcinoma  Reason for visit: 4 month follow up    Cancer and Treatment Hx:  Nov- Dec 2021: screening mammogram with possible architectural distortion in the right breast at 12:00. Diagnostic mammogram with inversion and retraction of R nipple and architectural distortion in the retroareolar and 12:00 positions and calcifications in the upper outer R breast. US with hypoechoic retroareolar mass 2.5cm and 2.5cm spiculated mass 12:00, 2cm from nipple, no suspicious LAD. R breast biopsy of 12:00 mass demonstrated invasive lobular carcinoma, grade 2 with LCIS, ER/GA+, HER2-, Ki-67 15%. R breast biopsy 6:00 position, 4cm from nipple with invasive lobular carcinoma, grade 2, ER/GA+, HER2-, Ki-67 5%.  Feb 2022: R mastectomy + SLNB, oncotype DX score 11, no chemo  May 2022: completed radiation to R chest wall and regional nodes  May 2022: started tamoxifen    Interval History:  Danielle has been doing well. Healed well from surgery, has some residual skin tightness post-radiation. Expander is at times bothersome, especially when she is running but despite that she has completed several [employer] competitions. She has reconstructive breast surgery planned Dec 8th which she is looking forward to. No new breast lumps, bone pain, or unintentional weight loss. Doing well on tamoxifen. Sometimes notices brain fog, unsure if related. No hot flashes. Will schedule pelvic exam soon. No questions or concerns today.    Medications, imaging, and labs:  - Reviewed pertinent medications, no refills needed today.  - Reviewed labs from June 2022  - Reviewed read of L mammogram from 6/28/22:  Findings: The patient is status post right mastectomy. The breast is heterogeneously dense,   which may obscure small masses.  There is no radiographic evidence of malignancy.  IMPRESSION: ACR BI-RADS Category 1: Negative    Physical Exam:    GEN: pleasantly conversant female no acute distress  SKIN: generally intact, no visible rash, bruising, or sores   ENT: eyes non-icteric, no notable oral sores  LYMPH: no notable cervical, supraclavicular, or axillary lymphadenopathy  BREAST: scar evidence of R mastectomy and expander in place, no chest wall masses noted, left breast is without masses or concerning skin changes  RESP: clear to auscultation bilaterally, on room air  CARDS: regular rate and rhythm, no notable murmurs   GI: abd soft without notable hepatosplenomegaly, non-tender to palpation  MSK: no notable lower extremity edema  NEURO: alert and oriented without obvious focal deficit, gait stable  /76 (BP Location: Left arm, Patient Position: Sitting)   Pulse 80   Wt 79.6 kg (175 lb 6.4 oz)   SpO2 97%   BMI 31.07 kg/m       Wt Readings from Last 4 Encounters:   10/20/22 79.6 kg (175 lb 6.4 oz)   05/09/22 77.7 kg (171 lb 4.8 oz)   05/04/22 77.7 kg (171 lb 6.4 oz)   04/28/22 76.5 kg (168 lb 11.2 oz)     Assessment and Plan:  Invasive lobular carcinoma right breast, ER/IA+  - Status post R mastectomy, radiation, and started tamoxifen May 2022  - Continues to tolerate tamoxifen well, no major side effects  - Will schedule pelvic exam given increased risk of endometrial cancer on tamoxifen  - L mammogram June 2022 without evidence of malignancy, continue yearly  - Has reconstructive surgery planned for December  - Per Dr. Krueger, MRI breast alternating with mammogram every 6 months given lobular type  - Breast MRI due in December  - Follow up with Dr. Huitron next in 4 months with labs prior    Prophylactic vaccination  - Received influenza vaccination and COVID booster in clinic today      The total time of this encounter amounted to 30 minutes today. This time includes face-to-face time spent with the patient, prep work, ordering tests, and performing post-visit documentation.  - Tahmina Lerma, CNP

## 2022-10-21 PROBLEM — Z79.810 LONG-TERM CURRENT USE OF TAMOXIFEN: Status: ACTIVE | Noted: 2022-10-21

## 2022-11-11 ENCOUNTER — TELEPHONE (OUTPATIENT)
Dept: ONCOLOGY | Facility: CLINIC | Age: 52
End: 2022-11-11

## 2022-11-11 NOTE — TELEPHONE ENCOUNTER
Left patient a second voicemail to call us back to schedule Breast MRI that Dr. Huitron wants her to do before her 12/8 surgery     Concha Palomino  Procedure   [phone]

## 2022-11-22 ASSESSMENT — ASTHMA QUESTIONNAIRES
QUESTION_2 LAST FOUR WEEKS HOW OFTEN HAVE YOU HAD SHORTNESS OF BREATH: ONCE OR TWICE A WEEK
QUESTION_1 LAST FOUR WEEKS HOW MUCH OF THE TIME DID YOUR ASTHMA KEEP YOU FROM GETTING AS MUCH DONE AT WORK, SCHOOL OR AT HOME: A LITTLE OF THE TIME
ACT_TOTALSCORE: 23
QUESTION_3 LAST FOUR WEEKS HOW OFTEN DID YOUR ASTHMA SYMPTOMS (WHEEZING, COUGHING, SHORTNESS OF BREATH, CHEST TIGHTNESS OR PAIN) WAKE YOU UP AT NIGHT OR EARLIER THAN USUAL IN THE MORNING: NOT AT ALL
ACT_TOTALSCORE: 23
QUESTION_5 LAST FOUR WEEKS HOW WOULD YOU RATE YOUR ASTHMA CONTROL: COMPLETELY CONTROLLED
QUESTION_4 LAST FOUR WEEKS HOW OFTEN HAVE YOU USED YOUR RESCUE INHALER OR NEBULIZER MEDICATION (SUCH AS ALBUTEROL): NOT AT ALL

## 2022-11-23 ENCOUNTER — OFFICE VISIT (OUTPATIENT)
Dept: FAMILY MEDICINE | Facility: CLINIC | Age: 52
End: 2022-11-23
Payer: COMMERCIAL

## 2022-11-23 VITALS
SYSTOLIC BLOOD PRESSURE: 110 MMHG | DIASTOLIC BLOOD PRESSURE: 70 MMHG | WEIGHT: 177 LBS | OXYGEN SATURATION: 97 % | BODY MASS INDEX: 31.35 KG/M2 | TEMPERATURE: 98.7 F | HEART RATE: 70 BPM

## 2022-11-23 DIAGNOSIS — F41.1 GAD (GENERALIZED ANXIETY DISORDER): ICD-10-CM

## 2022-11-23 DIAGNOSIS — J45.20 INTERMITTENT ASTHMA, UNCOMPLICATED: ICD-10-CM

## 2022-11-23 DIAGNOSIS — Z01.818 PREOP GENERAL PHYSICAL EXAM: Primary | ICD-10-CM

## 2022-11-23 DIAGNOSIS — Z90.11 HISTORY OF RIGHT MASTECTOMY: ICD-10-CM

## 2022-11-23 PROCEDURE — 90677 PCV20 VACCINE IM: CPT | Performed by: FAMILY MEDICINE

## 2022-11-23 PROCEDURE — 90472 IMMUNIZATION ADMIN EACH ADD: CPT | Performed by: FAMILY MEDICINE

## 2022-11-23 PROCEDURE — 90715 TDAP VACCINE 7 YRS/> IM: CPT | Performed by: FAMILY MEDICINE

## 2022-11-23 PROCEDURE — 90471 IMMUNIZATION ADMIN: CPT | Performed by: FAMILY MEDICINE

## 2022-11-23 PROCEDURE — 99214 OFFICE O/P EST MOD 30 MIN: CPT | Mod: 25 | Performed by: FAMILY MEDICINE

## 2022-11-23 RX ORDER — ALBUTEROL SULFATE 90 UG/1
2 AEROSOL, METERED RESPIRATORY (INHALATION) EVERY 4 HOURS PRN
Qty: 18 G | Refills: 4 | Status: SHIPPED | OUTPATIENT
Start: 2022-11-23

## 2022-11-23 RX ORDER — CITALOPRAM HYDROBROMIDE 40 MG/1
40 TABLET ORAL DAILY
Qty: 90 TABLET | Refills: 3 | Status: SHIPPED | OUTPATIENT
Start: 2022-11-23 | End: 2023-12-19

## 2022-11-23 NOTE — PROGRESS NOTES
[facility]  [address] [place] [address]  [place] [address]  Phone: [phone]  Primary Provider: Nita Cabrera  Pre-op Performing Provider: NITA CABRERA       PREOPERATIVE EVALUATION:  Today's date: 11/23/2022    Danielle Thrasher is a 52 year old female who presents for a preoperative evaluation.    Surgical Information:  Surgery/Procedure: Right breast implant  Surgery Location: [facility]  Surgeon: Dr. Linden Hernandez  Surgery Date: 12/8/22  Time of Surgery: 7:30 AM  Where patient plans to recover: At home with family  Fax number for surgical facility: [phone]      Type of Anesthesia Anticipated: to be determined    Assessment & Plan     The proposed surgical procedure is considered LOW risk.    Preop general physical exam  History of right mastectomy     Implanted Device:  -chest tissue expander  -Mirena IUD    Risks and Recommendations:  The patient has the following additional risks and recommendations for perioperative complications:   - No identified additional risk factors other than previously addressed    Medication Instructions:  Patient is to take all scheduled medications on the day of surgery EXCEPT for modifications listed below:   - ibuprofen (Advil, Motrin): HOLD 1 day before surgery.     RECOMMENDATION:  APPROVAL GIVEN to proceed with proposed procedure, without further diagnostic evaluation.      Subjective     HPI related to upcoming procedure: Danielle Thrasher is a 52 year old female who presents with need for implant after mastectomy.     Preop Questions 11/22/2022   1. Have you ever had a heart attack or stroke? No   2. Have you ever had surgery on your heart or blood vessels, such as a stent placement, a coronary artery bypass, or surgery on an artery in your head, neck, heart, or legs? No   3. Do you have chest pain with activity? No   4. Do you have a history of  heart failure? No   5. Do you currently have a cold,  bronchitis or symptoms of other infection? No   6. Do you have a cough, shortness of breath, or wheezing? No   7. Do you or anyone in your family have previous history of blood clots? No   8. Do you or does anyone in your family have a serious bleeding problem such as prolonged bleeding following surgeries or cuts? No   9. Have you ever had problems with anemia or been told to take iron pills? No   10. Have you had any abnormal blood loss such as black, tarry or bloody stools, or abnormal vaginal bleeding? No   11. Have you ever had a blood transfusion? No   12. Are you willing to have a blood transfusion if it is medically needed before, during, or after your surgery? Yes   13. Have you or any of your relatives ever had problems with anesthesia? No   14. Do you have sleep apnea, excessive snoring or daytime drowsiness? No   15. Do you have any artifical heart valves or other implanted medical devices like a pacemaker, defibrillator, or continuous glucose monitor? YES -     15a. What type of device do you have? Tissue expander - will be removed during surgery   15b. Name of the clinic that manages your device:  Dr. Oneal - [facility]   16. Do you have artificial joints? No   17. Are you allergic to latex? No   18. Is there any chance that you may be pregnant? No       Health Care Directive:  Patient does not have a Health Care Directive or Living Will: Discussed advance care planning with patient; however, patient declined at this time.    Preoperative Review of :   reviewed - no record of controlled substances prescribed.       Status of Chronic Conditions:  See problem list for active medical problems.  Problems all longstanding and stable, except as noted/documented.  See ROS for pertinent symptoms related to these conditions.      Review of Systems  CONSTITUTIONAL: NEGATIVE for fever, chills, change in weight  INTEGUMENTARY/SKIN: NEGATIVE for worrisome rashes, moles or lesions  EYES:  NEGATIVE for vision changes or irritation  ENT/MOUTH: NEGATIVE for ear, mouth and throat problems  RESP: NEGATIVE for significant cough or SOB  CV: NEGATIVE for chest pain, palpitations or peripheral edema  GI: NEGATIVE for nausea, abdominal pain, heartburn, or change in bowel habits  : NEGATIVE for frequency, dysuria, or hematuria  MUSCULOSKELETAL: NEGATIVE for significant arthralgias or myalgia  NEURO: NEGATIVE for weakness, dizziness or paresthesias  ENDOCRINE: NEGATIVE for temperature intolerance, skin/hair changes  HEME: NEGATIVE for bleeding problems  PSYCHIATRIC: NEGATIVE for changes in mood or affect  Constitutional, neuro, ENT, endocrine, pulmonary, cardiac, gastrointestinal, genitourinary, musculoskeletal, integument and psychiatric systems are negative, except as otherwise noted.    Patient Active Problem List    Diagnosis Date Noted     Malignant neoplasm of lower-outer quadrant of right female breast (H) 02/02/2022     Priority: High     Complete prior to 10.20 JESUS       Long-term current use of tamoxifen 10/21/2022     Priority: Medium     Intermittent asthma 09/23/2014     Priority: Medium     BRIDGET (generalized anxiety disorder) 05/07/2012     Priority: Medium     Dysmenorrhea 02/06/2018     Priority: Low     Allergic rhinitis      Priority: Low     Patellofemoral pain syndrome      Priority: Low      Past Medical History:   Diagnosis Date     Allergic rhinitis     mold     Major depressive disorder, recurrent episode (H) 2009     Malignant neoplasm of right breast in female, estrogen receptor positive (H) 12/22/2021    Invasive Lobular     Mild persistent asthma      Patellofemoral pain syndrome      S/P radiation therapy     5,000 cGy to right chest wall + right SCV completed on 5/9/2022 [facility]     Tear meniscus knee 1998    left     Past Surgical History:   Procedure Laterality Date     BREAST BIOPSY, CORE RT/LT Right 12/22/2021     EXCISE GANGLION WRIST      LT     LASIK        MASTECTOMY SIMPLE, SENTINEL NODE, COMBINED Right 02/08/2022    Procedure: right breast skin sparing mastectomy with right sentinel lymph node biopsy, axillary disection;  Surgeon: Yazmin Krueger MD;  Location: [facility] OR     RECONSTRUCT BREAST, INSERT TISSUE EXPANDER, COMBINED Right 02/08/2022    Procedure: RIGHT FIRST STAGE BREAST RECONSTRUCTION WITH TISSUE EXPANDER,  ACELLULAR DERMAL MATRIX;  Surgeon: Linden Hernandez MD;  Location: [facility] OR     TONSILLECTOMY & ADENOIDECTOMY       Current Outpatient Medications   Medication Sig Dispense Refill     acetaminophen (TYLENOL) 500 MG tablet Take 500-1,000 mg by mouth every 6 hours as needed for mild pain       albuterol (PROAIR HFA/PROVENTIL HFA/VENTOLIN HFA) 108 (90 Base) MCG/ACT inhaler Inhale 2 puffs into the lungs every 4 hours as needed for shortness of breath / dyspnea or wheezing 18 g 4     Ascorbic Acid (VITAMIN C PO) Take 500 mg by mouth       calcium carbonate (OS-JACKSON 500 MG [place]. [place]) 500 MG tablet Take 500 mg by mouth daily       cetirizine (ZYRTEC) 10 MG tablet Take 1 tablet by mouth daily. 90 tablet 3     citalopram (CELEXA) 40 MG tablet TAKE 1 TABLET DAILY 90 tablet 0     fluticasone (FLONASE) 50 MCG/ACT nasal spray Spray 1 spray into both nostrils daily 16 g 11     Magnesium 400 MG TABS Take by mouth daily       Multiple Vitamin (MULTI-VITAMIN) per tablet Take 1 tablet by mouth daily. 90 tablet 3     paragard intrauterine copper device 1 each by Intrauterine route once       tamoxifen (NOLVADEX) 20 MG tablet Take 1 tablet (20 mg) by mouth daily 90 tablet 3     IBUPROFEN PO Take 400 mg by mouth every 6 hours as needed for moderate pain         No Known Allergies     Social History     Tobacco Use     Smoking status: Never     Smokeless tobacco: Never     Tobacco comments:     non-smoking household   Substance Use Topics     Alcohol use: Yes     Alcohol/week: 2.5 standard drinks     Comment: glass of wine 2-3 times weekly with 1 liquor drink on the  weekend     Family History   Problem Relation Age of Onset     Breast Cancer Mother 56     Cancer Mother         skin     Cancer Father         skin     Cancer Maternal Grandmother         stomach     Cancer Maternal Grandfather         liver     Diabetes Maternal Grandfather      Heart Disease Maternal Grandfather         questionable MI     Colon Cancer Paternal Grandmother      C.A.D. Paternal Grandfather 70        MI     Ovarian Cancer Other         maternal great grandmother     Hypertension No family hx of      Cerebrovascular Disease No family hx of      History   Drug Use No         Objective     /70 (BP Location: Left arm, Patient Position: Sitting, Cuff Size: Adult Large)   Pulse 70   Temp 98.7  F (37.1  C) (Oral)   Wt 80.3 kg (177 lb)   SpO2 97%   BMI 31.35 kg/m      Physical Exam    GENERAL APPEARANCE: healthy, alert and no distress     EYES: EOMI, PERRL     HENT: ear canals and TM's normal and nose and mouth without ulcers or lesions     NECK: no adenopathy, no asymmetry, masses, or scars and thyroid normal to palpation     RESP: lungs clear to auscultation - no rales, rhonchi or wheezes     CV: regular rates and rhythm, normal S1 S2, no S3 or S4 and no murmur, click or rub     ABDOMEN:  soft, nontender, no HSM or masses and bowel sounds normal     MS: extremities normal- no gross deformities noted, no evidence of inflammation in joints, FROM in all extremities.     SKIN: no suspicious lesions or rashes     NEURO: Normal strength and tone, sensory exam grossly normal, mentation intact and speech normal     PSYCH: mentation appears normal. and affect normal/bright     LYMPHATICS: No cervical adenopathy    No results for input(s): HGB, PLT, INR, NA, POTASSIUM, CR, A1C in the last 74049 hours.     Diagnostics:  No labs were ordered during this visit.   No EKG required for low risk surgery (cataract, skin procedure, breast biopsy, etc).    Revised Cardiac Risk Index (RCRI):  The patient has the  following serious cardiovascular risks for perioperative complications:   - No serious cardiac risks = 0 points     RCRI Interpretation: 0 points: Class I (very low risk - 0.4% complication rate)           Signed Electronically by: Mehnaz Cabrera MD  Copy of this evaluation report is provided to requesting physician.

## 2022-11-23 NOTE — LETTER
My Asthma Action Plan    Name: Danielle Thrasher   YOB: 1970  Date: 11/23/2022   My doctor: Mehnaz Cabrera MD   My clinic: [facility]        My Rescue Medicine:   Albuterol inhaler (Proair/Ventolin/Proventil HFA)  2-4 puffs EVERY 4 HOURS as needed. Use a spacer if recommended by your provider.   My Asthma Severity:   Intermittent / Exercise Induced  Know your asthma triggers: upper respiratory infections  pollens          GREEN ZONE   Good Control    I feel good    No cough or wheeze    Can work, sleep and play without asthma symptoms       Take your asthma control medicine every day.     1. If exercise triggers your asthma, take your rescue medication    15 minutes before exercise or sports, and    During exercise if you have asthma symptoms  2. Spacer to use with inhaler: If you have a spacer, make sure to use it with your inhaler             YELLOW ZONE Getting Worse  I have ANY of these:    I do not feel good    Cough or wheeze    Chest feels tight    Wake up at night   1. Keep taking your Green Zone medications  2. Start taking your rescue medicine:    every 20 minutes for up to 1 hour. Then every 4 hours for 24-48 hours.  3. If you stay in the Yellow Zone for more than 12-24 hours, contact your doctor.  4. If you do not return to the Green Zone in 12-24 hours or you get worse, start taking your oral steroid medicine if prescribed by your provider.           RED ZONE Medical Alert - Get Help  I have ANY of these:    I feel awful    Medicine is not helping    Breathing getting harder    Trouble walking or talking    Nose opens wide to breathe       1. Take your rescue medicine NOW  2. If your provider has prescribed an oral steroid medicine, start taking it NOW  3. Call your doctor NOW  4. If you are still in the Red Zone after 20 minutes and you have not reached your doctor:    Take your rescue medicine again and    Call 911 or go to the emergency room right away    See  your regular doctor within 2 weeks of an Emergency Room or Urgent Care visit for follow-up treatment.          Annual Reminders:  Meet with Asthma Educator,  Flu Shot in the Fall, consider Pneumonia Vaccination for patients with asthma (aged 19 and older).    Pharmacy:    [id number] - [place] - [address] AT [facility] & [place]  [facility] - [address]    Electronically signed by Mehnaz Cabrera MD   Date: 11/23/22                    Asthma Triggers  How To Control Things That Make Your Asthma Worse    Triggers are things that make your asthma worse.  Look at the list below to help you find your triggers and   what you can do about them. You can help prevent asthma flare-ups by staying away from your triggers.      Trigger                                                          What you can do   Cigarette Smoke  Tobacco smoke can make asthma worse. Do not allow smoking in your home, car or around you.  Be sure no one smokes at a child s day care or school.  If you smoke, ask your health care provider for ways to help you quit.  Ask family members to quit too.  Ask your health care provider for a referral to Quit Plan to help you quit smoking, or call 4-549-610-PLAN.     Colds, Flu, Bronchitis  These are common triggers of asthma. Wash your hands often.  Don t touch your eyes, nose or mouth.  Get a flu shot every year.     Dust Mites  These are tiny bugs that live in cloth or carpet. They are too small to see. Wash sheets and blankets in hot water every week.   Encase pillows and mattress in dust mite proof covers.  Avoid having carpet if you can. If you have carpet, vacuum weekly.   Use a dust mask and HEPA vacuum.   Pollen and Outdoor Mold  Some people are allergic to trees, grass, or weed pollen, or molds. Try to keep your windows closed.  Limit time out doors when pollen count is high.   Ask you health care provider about  taking medicine during allergy season.     Animal Dander  Some people are allergic to skin flakes, urine or saliva from pets with fur or feathers. Keep pets with fur or feathers out of your home.    If you can t keep the pet outdoors, then keep the pet out of your bedroom.  Keep the bedroom door closed.  Keep pets off cloth furniture and away from stuffed toys.     Mice, Rats, and Cockroaches  Some people are allergic to the waste from these pests.   Cover food and garbage.  Clean up spills and food crumbs.  Store grease in the refrigerator.   Keep food out of the bedroom.   Indoor Mold  This can be a trigger if your home has high moisture. Fix leaking faucets, pipes, or other sources of water.   Clean moldy surfaces.  Dehumidify basement if it is damp and smelly.   Smoke, Strong Odors, and Sprays  These can reduce air quality. Stay away from strong odors and sprays, such as perfume, powder, hair spray, paints, smoke incense, paint, cleaning products, candles and new carpet.   Exercise or Sports  Some people with asthma have this trigger. Be active!  Ask your doctor about taking medicine before sports or exercise to prevent symptoms.    Warm up for 5-10 minutes before and after sports or exercise.     Other Triggers of Asthma  Cold air:  Cover your nose and mouth with a scarf.  Sometimes laughing or crying can be a trigger.  Some medicines and food can trigger asthma.

## 2022-11-23 NOTE — PROGRESS NOTES
SUBJECTIVE:   CC: Danielle is an 52 year old who presents for preventive health visit.   {Split Bill scripting  The purpose of this visit is to discuss your medical history and prevent health problems before you are sick. You may be responsible for a co-pay, coinsurance, or deductible if your visit today includes services such as checking on a sore throat, having an x-ray or lab test, or treating and evaluating a new or existing condition :690336}  {Patient advised of split billing (Optional):214844}  Healthy Habits:   PHQ-2 Total Score: 1    {Add if <65 person on Medicare  - Required Questions (Optional):118540}  {Outside tests to abstract? :044000}    {additional problems to add (Optional):055358}    Today's PHQ-2 Score:   PHQ-2 ( 1999 Pfizer) 11/22/2022   Q1: Little interest or pleasure in doing things 1   Q2: Feeling down, depressed or hopeless 0   PHQ-2 Score 1   PHQ-2 Total Score (12-17 Years)- Positive if 3 or more points; Administer PHQ-A if positive -   Q1: Little interest or pleasure in doing things Several days   Q2: Feeling down, depressed or hopeless Not at all   PHQ-2 Score 1           Social History     Tobacco Use     Smoking status: Never     Smokeless tobacco: Never     Tobacco comments:     non-smoking household   Substance Use Topics     Alcohol use: Yes     Alcohol/week: 2.5 standard drinks     Comment: glass of wine 2-3 times weekly with 1 liquor drink on the weekend     {Rooming Staff- Complete this question if Prescreen response is not shown below for today's visit. If you drink alcohol do you typically have >3 drinks per day or >7 drinks per week? (Optional):989967}    Alcohol Use 5/1/2019   Prescreen: >3 drinks/day or >7 drinks/week? No   Prescreen: >3 drinks/day or >7 drinks/week? -   {add AUDIT responses (Optional) (A score of 7 for adult men is an indication of hazardous drinking; a score of 8 or more is an indication of an alcohol use disorder.  A score of 7 or more for adult women  "is an indication of hazardous drinking or an alchohol use disorder):698914}    Reviewed orders with patient.  Reviewed health maintenance and updated orders accordingly - { :092524::\"Yes\"}  {Chronicprobdata (optional):764983}    Breast Cancer Screening:    FHS-7:   Breast CA Risk Assessment (FHS-7) 11/11/2021 12/13/2021 6/28/2022   Did any of your first-degree relatives have breast or ovarian cancer? Yes Yes Yes   Did any of your relatives have bilateral breast cancer? No Yes Yes   Did any man in your family have breast cancer? No No No   Did any woman in your family have breast and ovarian cancer? No No No   Did any woman in your family have breast cancer before age 50 y? No No No   Do you have 2 or more relatives with breast and/or ovarian cancer? No No No   Do you have 2 or more relatives with breast and/or bowel cancer? No No No     {If any of the questions to the BCRA (FHS-7) are answered yes, consider ordering referral for genetic counseling (Optional) :615160::\"click delete button to remove this line now\"}  {AMB Mammogram Decision Support (Optional) :737587}  Pertinent mammograms are reviewed under the imaging tab.    History of abnormal Pap smear: { :156929}  PAP / HPV Latest Ref Rng & Units 5/1/2019 9/24/2014 4/6/2011   PAP (Historical) - NIL NIL NIL   HPV16 NEG:Negative Negative - -   HPV18 NEG:Negative Negative - -   HRHPV NEG:Negative Negative - -     Reviewed and updated as needed this visit by clinical staff                  Reviewed and updated as needed this visit by Provider                 {HISTORY OPTIONS (Optional):791855}    Review of Systems  {FEMALE ROS (Optional):870076}     OBJECTIVE:   There were no vitals taken for this visit.  Physical Exam  {Exam Choices (Optional):781715}    {Diagnostic Test Results (Optional):305569::\"Diagnostic Test Results:\",\"Labs reviewed in Epic\"}    ASSESSMENT/PLAN:   {Diag Picklist:732798}    {Patient advised of split billing " "(Optional):323750}      COUNSELING:  {FEMALE COUNSELING MESSAGES:186110::\"Reviewed preventive health counseling, as reflected in patient instructions\"}      BMI:   Estimated body mass index is 31.07 kg/m  as calculated from the following:    Height as of 3/1/22: 1.6 m (5' 3\").    Weight as of 10/20/22: 79.6 kg (175 lb 6.4 oz).   {Weight Management Plan needed for ACO:124965}      She reports that she has never smoked. She has never used smokeless tobacco.      {Counseling Resources  [place] Preventive Services Task Force  Cholesterol Screening  Health diet/nutrition  Pooled Cohorts Equation Calculator  USDA's MyPlate  ASA Prophylaxis  Lung CA Screening  Osteoporosis prevention/bone health :107610}  {Breast Cancer Risk Calculator  BRCA-Related Cancer Risk Assessment FHS-7 Tool :877980}  Mehnaz Cabrera MD  [facility]  "

## 2022-11-23 NOTE — PATIENT INSTRUCTIONS
Preparing for Your Surgery  Getting started  A nurse will call you to review your health history and instructions. They will give you an arrival time based on your scheduled surgery time. Please be ready to share:  Your doctor s clinic name and phone number  Your medical, surgical, and anesthesia history  A list of allergies and sensitivities  A list of medicines, including herbal treatments and over-the-counter drugs  Whether the patient has a legal guardian (ask how to send us the papers in advance)  Please tell us if you re pregnant--or if there s any chance you might be pregnant. Some surgeries may injure a fetus (unborn baby), so they require a pregnancy test. Surgeries that are safe for a fetus don t always need a test, and you can choose whether to have one.   If you have a child who s having surgery, please ask for a copy of Preparing for Your Child s Surgery.    Preparing for surgery  Within 10 to 30 days of surgery: Have a pre-op exam (sometimes called an H&P, or History and Physical). This can be done at a clinic or pre-operative center.  If you re having a , you may not need this exam. Talk to your care team.  At your pre-op exam, talk to your care team about all medicines you take. If you need to stop any medicines before surgery, ask when to start taking them again.  We do this for your safety. Many medicines can make you bleed too much during surgery. Some change how well surgery (anesthesia) drugs work.  Call your insurance company to let them know you re having surgery. (If you don t have insurance, call [phone].)  Call your clinic if there s any change in your health. This includes signs of a cold or flu (sore throat, runny nose, cough, rash, fever). It also includes a scrape or scratch near the surgery site.  If you have questions on the day of surgery, call your hospital or surgery center.  COVID testing  You may need to be tested for COVID-19 before having surgery. If so, we will  give you instructions (or click here).  Eating and drinking guidelines  For your safety: Unless your surgeon tells you otherwise, follow the guidelines below.  Eat and drink as usual until 8 hours before you arrive for surgery. After that, no food or milk.  Drink clear liquids until 2 hours before you arrive. These are liquids you can see through, like water, Gatorade, and Propel Water. They also include plain black coffee and tea (no cream or milk), candy, and breath mints. You can spit out gum when you arrive.  If you drink alcohol: Stop drinking it the night before surgery.  If your care team tells you to take medicine on the morning of surgery, it s okay to take it with a sip of water.  Preventing infection  Shower or bathe the night before and morning of your surgery. Follow the instructions your clinic gave you. (If no instructions, use regular soap.)  Don t shave or clip hair near your surgery site. We ll remove the hair if needed.  Don t smoke or vape the morning of surgery. You may chew nicotine gum up to 2 hours before surgery. A nicotine patch is okay.  Note: Some surgeries require you to completely quit smoking and nicotine. Check with your surgeon.  Your care team will make every effort to keep you safe from infection. We will:  Clean our hands often with soap and water (or an alcohol-based hand rub).  Clean the skin at your surgery site with a special soap that kills germs.  Give you a special gown to keep you warm. (Cold raises the risk of infection.)  Wear special hair covers, masks, gowns and gloves during surgery.  Give antibiotic medicine, if prescribed. Not all surgeries need antibiotics.  What to bring on the day of surgery  Photo ID and insurance card  Copy of your health care directive, if you have one  Glasses and hearing aids (bring cases)  You can t wear contacts during surgery  Inhaler and eye drops, if you use them (tell us about these when you arrive)  CPAP machine or breathing device,  if you use them  A few personal items, if spending the night  If you have . . .  A pacemaker, ICD (cardiac defibrillator) or other implant: Bring the ID card.  An implanted stimulator: Bring the remote control.  A legal guardian: Bring a copy of the certified (court-stamped) guardianship papers.  Please remove any jewelry, including body piercings. Leave jewelry and other valuables at home.  If you re going home the day of surgery  You must have a responsible adult drive you home. They should stay with you overnight as well.  If you don t have someone to stay with you, and you aren t safe to go home alone, we may keep you overnight. Insurance often won t pay for this.  After surgery  If it s hard to control your pain or you need more pain medicine, please call your surgeon s office.  Questions?   If you have any questions for your care team, list them here:   ____________________________________________________________________________________________________________________________________________________________________________________________________________________________________________________________________  For informational purposes only. Not to replace the advice of your health care provider. Copyright   2003, 2019 [employer]. All rights reserved. Clinically reviewed by Daria Henson MD. [employer] 015843 - REV 10/22.

## 2022-12-01 NOTE — TELEPHONE ENCOUNTER
Left patient a third voicemail to call us back to schedule breast MRI     Concha Palomino  Procedure   [phone]

## 2022-12-29 ENCOUNTER — ANCILLARY PROCEDURE (OUTPATIENT)
Dept: MRI IMAGING | Facility: CLINIC | Age: 52
End: 2022-12-29
Attending: SURGERY
Payer: COMMERCIAL

## 2022-12-29 DIAGNOSIS — Z12.39 BREAST CANCER SCREENING, HIGH RISK PATIENT: ICD-10-CM

## 2022-12-29 PROCEDURE — 77049 MRI BREAST C-+ W/CAD BI: CPT | Performed by: STUDENT IN AN ORGANIZED HEALTH CARE EDUCATION/TRAINING PROGRAM

## 2022-12-29 PROCEDURE — A9585 GADOBUTROL INJECTION: HCPCS | Performed by: STUDENT IN AN ORGANIZED HEALTH CARE EDUCATION/TRAINING PROGRAM

## 2022-12-29 RX ORDER — GADOBUTROL 604.72 MG/ML
10 INJECTION INTRAVENOUS ONCE
Status: COMPLETED | OUTPATIENT
Start: 2022-12-29 | End: 2022-12-29

## 2022-12-29 RX ORDER — GADOBUTROL 604.72 MG/ML
10 INJECTION INTRAVENOUS ONCE
Status: DISCONTINUED | OUTPATIENT
Start: 2022-12-29 | End: 2022-12-29

## 2022-12-29 RX ADMIN — GADOBUTROL 8 ML: 604.72 INJECTION INTRAVENOUS at 10:01

## 2023-01-02 PROBLEM — C50.511 MALIGNANT NEOPLASM OF LOWER-OUTER QUADRANT OF RIGHT FEMALE BREAST (H): Status: ACTIVE | Noted: 2022-02-02

## 2023-01-04 ENCOUNTER — TELEPHONE (OUTPATIENT)
Dept: SURGERY | Facility: CLINIC | Age: 53
End: 2023-01-04

## 2023-01-04 NOTE — CONFIDENTIAL NOTE
I called and let her know her breast MRI looks good. No areas of concern. Plan for left mammogram in 6 months and MRI in one year for high risk screening given the prior right lobular breast cancer.     Yazmin Krueger MD  [facility]  [phone]

## 2023-02-13 DIAGNOSIS — Z17.0 MALIGNANT NEOPLASM OF OVERLAPPING SITES OF LEFT BREAST IN FEMALE, ESTROGEN RECEPTOR POSITIVE (H): ICD-10-CM

## 2023-02-13 DIAGNOSIS — C50.812 MALIGNANT NEOPLASM OF OVERLAPPING SITES OF LEFT BREAST IN FEMALE, ESTROGEN RECEPTOR POSITIVE (H): ICD-10-CM

## 2023-02-14 ENCOUNTER — PATIENT OUTREACH (OUTPATIENT)
Dept: ONCOLOGY | Facility: CLINIC | Age: 53
End: 2023-02-14
Payer: COMMERCIAL

## 2023-02-14 NOTE — TELEPHONE ENCOUNTER
[facility] - Refill Request    Refill request information:                                                      Refill request received for: tamoxifen (NOLVADEX) 20 MG tablet     Date of last refill, if known: 11/16/22 (quantity of 90 tablets)    Last office visit: 10/20/22 (Tahmina Lerma, ELBERT)    Future appointment scheduled? Yes: 2/22/23 (Dr. Huitron)    Plan:                                                      Refill request sent to provider for review.    Yobany Cross, RN, BSN, OCN  RN Care Coordinator - Oncology  [facility]

## 2023-02-14 NOTE — PROGRESS NOTES
[facility]: Cancer Care                                                                                          Attempted to call patient today to discuss the cancer treatment summary that was prepared and sent via [employer]. Left a generic voice message requesting a call back at my direct phone number: [phone].    Abbie Canchola RN BSN  Cancer Survivorship Coordinator

## 2023-02-15 RX ORDER — TAMOXIFEN CITRATE 20 MG/1
TABLET ORAL
Qty: 90 TABLET | Refills: 3 | Status: SHIPPED | OUTPATIENT
Start: 2023-02-15 | End: 2024-02-09

## 2023-02-21 NOTE — PROGRESS NOTES
[facility]: Cancer Care                                                                                          Attempted to reach patient two separate times to review survivorship care plan. Patient was not able to be reached at this time. A call back number was left for the patient if they have any future survivorship needs. No additional attempts will be made.    Abbie Canchola RN BSN  Cancer Survivorship Coordinator

## 2023-02-22 ENCOUNTER — LAB (OUTPATIENT)
Dept: LAB | Facility: CLINIC | Age: 53
End: 2023-02-22
Payer: COMMERCIAL

## 2023-02-22 ENCOUNTER — VIRTUAL VISIT (OUTPATIENT)
Dept: ONCOLOGY | Facility: CLINIC | Age: 53
End: 2023-02-22
Attending: INTERNAL MEDICINE
Payer: COMMERCIAL

## 2023-02-22 DIAGNOSIS — Z17.0 MALIGNANT NEOPLASM OF LOWER-OUTER QUADRANT OF RIGHT BREAST OF FEMALE, ESTROGEN RECEPTOR POSITIVE (H): Primary | ICD-10-CM

## 2023-02-22 DIAGNOSIS — Z13.820 SCREENING FOR OSTEOPOROSIS: ICD-10-CM

## 2023-02-22 DIAGNOSIS — Z17.0 MALIGNANT NEOPLASM OF LOWER-OUTER QUADRANT OF RIGHT BREAST OF FEMALE, ESTROGEN RECEPTOR POSITIVE (H): ICD-10-CM

## 2023-02-22 DIAGNOSIS — E78.5 DYSLIPIDEMIA: ICD-10-CM

## 2023-02-22 DIAGNOSIS — C50.511 MALIGNANT NEOPLASM OF LOWER-OUTER QUADRANT OF RIGHT BREAST OF FEMALE, ESTROGEN RECEPTOR POSITIVE (H): ICD-10-CM

## 2023-02-22 DIAGNOSIS — C50.511 MALIGNANT NEOPLASM OF LOWER-OUTER QUADRANT OF RIGHT BREAST OF FEMALE, ESTROGEN RECEPTOR POSITIVE (H): Primary | ICD-10-CM

## 2023-02-22 LAB
ALBUMIN SERPL-MCNC: 3.5 G/DL (ref 3.4–5)
ALP SERPL-CCNC: 48 U/L (ref 40–150)
ALT SERPL W P-5'-P-CCNC: 15 U/L (ref 0–50)
ANION GAP SERPL CALCULATED.3IONS-SCNC: 5 MMOL/L (ref 3–14)
AST SERPL W P-5'-P-CCNC: 13 U/L (ref 0–45)
BILIRUB SERPL-MCNC: 0.3 MG/DL (ref 0.2–1.3)
BUN SERPL-MCNC: 18 MG/DL (ref 7–30)
CALCIUM SERPL-MCNC: 9 MG/DL (ref 8.5–10.1)
CHLORIDE BLD-SCNC: 111 MMOL/L (ref 94–109)
CHOLEST SERPL-MCNC: 133 MG/DL
CO2 SERPL-SCNC: 27 MMOL/L (ref 20–32)
CREAT SERPL-MCNC: 0.74 MG/DL (ref 0.52–1.04)
FASTING STATUS PATIENT QL REPORTED: YES
GFR SERPL CREATININE-BSD FRML MDRD: >90 ML/MIN/1.73M2
GLUCOSE BLD-MCNC: 92 MG/DL (ref 70–99)
HDLC SERPL-MCNC: 51 MG/DL
LDLC SERPL CALC-MCNC: 48 MG/DL
NONHDLC SERPL-MCNC: 82 MG/DL
POTASSIUM BLD-SCNC: 3.7 MMOL/L (ref 3.4–5.3)
PROT SERPL-MCNC: 7.9 G/DL (ref 6.8–8.8)
SODIUM SERPL-SCNC: 143 MMOL/L (ref 133–144)
TRIGL SERPL-MCNC: 172 MG/DL

## 2023-02-22 PROCEDURE — 99213 OFFICE O/P EST LOW 20 MIN: CPT | Mod: VID | Performed by: INTERNAL MEDICINE

## 2023-02-22 PROCEDURE — 80061 LIPID PANEL: CPT

## 2023-02-22 PROCEDURE — 36415 COLL VENOUS BLD VENIPUNCTURE: CPT

## 2023-02-22 PROCEDURE — 80053 COMPREHEN METABOLIC PANEL: CPT

## 2023-02-22 NOTE — PROGRESS NOTES
[facility] Hematology / Oncology  Progress Note  Name: Danielle Thrasher  :  1970    MRN:  [id number]    --------------------    Assessment / Plan:  Stage IIA (pT2 pN1a cM0), hormone positive, HER-2 negative, right-sided breast cancer.  # Right breast mastectomy with sentinel lymph node 2022.  # Oncotype Dx score 11; chemotherapy not indicated.  # Adjuvant right chest wall and region antonino radiation 50 Gy over 25 frxn Apr - May 2022.  # Adjuvant Tamoxifen May 2022 - ongoing.    Regarding breast cancer, Danielle is tolerating adjuvant tamoxifen well without major side effects.  She remains on 20 mg daily with plans for 5, but medially 10 years.  Due to her young age and dense breast tissue, we are alternating breast MRI and mammogram.  Her last breast MRI was reassuring.  Needs annual GYN exam on tamoxifen.  She has previously had an excellent lipid panels.  Return to clinic 6 months with left breast mammogram and DEXA scan.  Prescription for premarin cream discussed and prescribed.    Enrrique Huitron MD    --------------------    Interval History:  Danielle returns for follow up of breast cancer unaccompanied.  All in all, she is doing quite well.  Fortunately suffered a few setbacks that she recovers postoperatively.  She has been on limited activity and as such she feels like she is gaining of weight.  Doing well with tamoxifen.  No major side effects.  No major hot flashes or arthralgias.  Dealing w/ vaginal dryness.    --------------------    Physical Exam:  Video visit.    Labs / Imaging / Path:  Reviewed ALT, lipid panel and breast MRI.    Video Visit:  Danielle is a 52 year old female who is being evaluated via a billable video visit.  }    Video start time: 10:09 AM  Video end time: 10:20 AM    Provider location: [facility]  Patient location: Home    Mode of transmission:  [employer] / [employer].

## 2023-02-22 NOTE — NURSING NOTE
Is the patient currently in the state of [place]? YES    Visit mode:VIDEO    If the visit is dropped, the patient can be reconnected by: VIDEO VISIT: Text to cell phone: [phone]    Will anyone else be joining the visit? NO      How would you like to obtain your AVS? MyChart    Are changes needed to the allergy or medication list? NO    [facility]      Danielle ANDREA

## 2023-03-07 ENCOUNTER — DOCUMENTATION ONLY (OUTPATIENT)
Dept: ONCOLOGY | Facility: CLINIC | Age: 53
End: 2023-03-07
Payer: COMMERCIAL

## 2023-03-07 NOTE — PROGRESS NOTES
CLINICAL NUTRITION SERVICES     Reason for Contact: Questions from Oncology Distress Screening  1. How concerned are you about your ability to eat? :  0  2. How concerned are you about unintended weight loss or your current weight? : 6  3. Patient requested to speak with a Dietitian on the Oncology Distress Screening tool. Yes    Action: RD called patient indicating reason for phone call. Left a VM with a return call back number.     Follow up: Wait for a return phone call.    Angeline Hoyt RD, LD  [phone]

## 2023-03-14 ENCOUNTER — MYC MEDICAL ADVICE (OUTPATIENT)
Dept: FAMILY MEDICINE | Facility: CLINIC | Age: 53
End: 2023-03-14
Payer: COMMERCIAL

## 2023-04-01 ENCOUNTER — HEALTH MAINTENANCE LETTER (OUTPATIENT)
Age: 53
End: 2023-04-01

## 2023-06-23 ENCOUNTER — MYC MEDICAL ADVICE (OUTPATIENT)
Dept: FAMILY MEDICINE | Facility: CLINIC | Age: 53
End: 2023-06-23
Payer: COMMERCIAL

## 2023-06-23 ASSESSMENT — ASTHMA QUESTIONNAIRES: ACT_TOTALSCORE: 23

## 2023-06-27 ENCOUNTER — VIRTUAL VISIT (OUTPATIENT)
Dept: FAMILY MEDICINE | Facility: CLINIC | Age: 53
End: 2023-06-27
Payer: COMMERCIAL

## 2023-06-27 DIAGNOSIS — Z71.3 WEIGHT LOSS COUNSELING, ENCOUNTER FOR: Primary | ICD-10-CM

## 2023-06-27 PROCEDURE — 99214 OFFICE O/P EST MOD 30 MIN: CPT | Mod: VID | Performed by: FAMILY MEDICINE

## 2023-06-27 NOTE — PROGRESS NOTES
Danielle is a 53 year old who is being evaluated via a billable video visit.      How would you like to obtain your AVS? MyChart  If the video visit is dropped, the invitation should be resent by: Text to cell phone: [phone]  Will anyone else be joining your video visit? No          Assessment & Plan       ICD-10-CM    1. Weight loss counseling, encounter for  Z71.3 Semaglutide-Weight Management (WEGOVY) 0.25 MG/0.5ML pen            Review of external notes as documented elsewhere in note         There are no Patient Instructions on file for this visit.    Tay Lieberman MD  [facility]    Dahlia Santos is a 53 year old, presenting for the following health issues:  Weight Problem (Would like to talk about Ozempic )        6/27/2023     2:51 PM   Additional Questions   Roomed by Gayle   Accompanied by None         6/27/2023     2:51 PM   Patient Reported Additional Medications   Patient reports taking the following new medications none     History of Present Illness       Reason for visit:  Interested in Ozempic    She eats 2-3 servings of fruits and vegetables daily.She consumes 1 sweetened beverage(s) daily.She exercises with enough effort to increase her heart rate 30 to 60 minutes per day.  She exercises with enough effort to increase her heart rate 5 days per week.   She is taking medications regularly.       Wt Readings from Last 4 Encounters:   11/23/22 80.3 kg (177 lb)   10/20/22 79.6 kg (175 lb 6.4 oz)   05/09/22 77.7 kg (171 lb 4.8 oz)   05/04/22 77.7 kg (171 lb 6.4 oz)     Breakfast - berries, yogurt (0% greek), granola (low sugar), sometimes protein shake  Lunch - boiled eggs, sometimes bowl of yogurt/berries, frozen veggies, parmesan cheese  Dinner - blue apron and sunbasket - wellness meals  Sleep - tired a lot              Review of Systems   Constitutional, HEENT, cardiovascular, pulmonary, gi and gu systems are negative, except as otherwise noted.      Objective            Vitals:  No vitals were obtained today due to virtual visit.    Physical Exam   GENERAL: Healthy, alert and no distress  EYES: Eyes grossly normal to inspection.  No discharge or erythema, or obvious scleral/conjunctival abnormalities.  RESP: No audible wheeze, cough, or visible cyanosis.  No visible retractions or increased work of breathing.    SKIN: Visible skin clear. No significant rash, abnormal pigmentation or lesions.  NEURO: Cranial nerves grossly intact.  Mentation and speech appropriate for age.  PSYCH: Mentation appears normal, affect normal/bright, judgement and insight intact, normal speech and appearance well-groomed.                Video-Visit Details    Type of service:  Video Visit     Originating Location (pt. Location): Home    Distant Location (provider location):  On-site  Platform used for Video Visit: [employer]

## 2023-07-31 ENCOUNTER — MYC MEDICAL ADVICE (OUTPATIENT)
Dept: FAMILY MEDICINE | Facility: CLINIC | Age: 53
End: 2023-07-31
Payer: COMMERCIAL

## 2023-07-31 DIAGNOSIS — Z71.3 WEIGHT LOSS COUNSELING, ENCOUNTER FOR: ICD-10-CM

## 2023-08-02 ENCOUNTER — MYC MEDICAL ADVICE (OUTPATIENT)
Dept: FAMILY MEDICINE | Facility: CLINIC | Age: 53
End: 2023-08-02
Payer: COMMERCIAL

## 2023-08-02 DIAGNOSIS — Z71.3 WEIGHT LOSS COUNSELING, ENCOUNTER FOR: ICD-10-CM

## 2023-08-08 NOTE — LETTER
12/30/2021         RE: Danielle Thrasher  [address] [facility] [address]        Dear Colleague,    Thank you for referring your patient, Danielle Thrasher, to the [facility]. Please see a copy of my visit note below.    [facility] Breast Surgery Consultation    HPI:   Danielle Thrasher is a 51 year old female who is seen in consultation at the request of Dr. Cabrera for evaluation of newly diagnosed invasive lobular carcinoma, grade 2 with LCIS, ER 98%, GA 90%, HER 2 negative at 12:00, 2cm FN and at 6:00, 4cm FN.     She had presented for a screening mammogram on 11/11/2021 which revealed an architectural distortion in the right breast at 12:00.  Diagnostic imaging revealed inversion and retraction of the right nipple and two areas of architectural distortion as well as calcifications. US revealed an irregular hypoechoic mass in the retroareolar right breast measuring 1.8cm and adjacent to this is a similar mass at 12:00, 2cm FN measuring 2.5cm. US of the right axilla is normal.     Contrast enhanced mammogram revealed the 2 continuous masses in the right breast measuring 4cm with enhancement extending to the nipple. There were three small areas of enhancement at 6:00, US at this site revealed a 2.1cm mass at 6:00, 4cm FN and a 1cm mass at 6:00, 2cm FN.     She then had biopsy of the masses at 12:00 and 6:00 both of which revealed a grade 2 invasive lobular carcinoma.     Danielle reports she has noticed right nipple retraction very gradually over the course of the past couple years. She did not notice a mass in her breast. She has not had nipple drainage. no breast pain. No prior breast surgeries or biopsies.  Danielle is very active. She runs [employer] races regularly and is scheduled for one in April. She also is an avid hiker and had a trip planned for [place] in February.     Hormonal history:  menarche 13, no children,  Post -menopausal, 30 years OCP use and  current IUD, no HRT, no fertility treatment.     Family history of breast cancer: Yes - mother had at 53 and recurrence at 62  Family history of ovarian cancer:  No  Family history of colon cancer: No  Family history of prostate cancer: No      Past Medical History:   has a past medical history of Allergic rhinitis, Major depressive disorder, recurrent episode (H) (2009), Mild persistent asthma, Patellofemoral pain syndrome, and Tear meniscus knee (1998).      Current Outpatient Medications:      acetaminophen (TYLENOL) 500 MG tablet, Take 1-2 tablets by mouth every 6 hours as needed for pain and fever., Disp: 100 tablet, Rfl: 11     albuterol (PROAIR HFA/PROVENTIL HFA/VENTOLIN HFA) 108 (90 Base) MCG/ACT inhaler, Inhale 2 puffs into the lungs every 4 hours as needed for shortness of breath / dyspnea or wheezing, Disp: 18 g, Rfl: 4     Ascorbic Acid (VITAMIN C PO), Take 500 mg by mouth, Disp: , Rfl:      calcium carbonate (OS-JACKSON 500 MG [place]. [place]) 500 MG tablet, Take 500 mg by mouth daily, Disp: , Rfl:      cetirizine (ZYRTEC) 10 MG tablet, Take 1 tablet by mouth daily., Disp: 90 tablet, Rfl: 3     citalopram (CELEXA) 40 MG tablet, Take 1 tablet (40 mg) by mouth daily, Disp: 90 tablet, Rfl: 3     fluticasone (FLONASE) 50 MCG/ACT nasal spray, Spray 1 spray into both nostrils daily, Disp: 16 g, Rfl: 11     levonorgestrel (MIRENA) 20 MCG/24HR IUD, 1 each (20 mcg) by Intrauterine route once for 1 dose, Disp: 1 each, Rfl: 0     Magnesium 400 MG TABS, Take by mouth daily, Disp: , Rfl:      Multiple Vitamin (MULTI-VITAMIN) per tablet, Take 1 tablet by mouth daily., Disp: 90 tablet, Rfl: 3     Omega-3 Fatty Acids (OMEGA-3 FISH OIL PO), Take by mouth daily, Disp: , Rfl:     Past Surgical History:  Past Surgical History:   Procedure Laterality Date     EXCISE GANGLION WRIST      LT     LASIK       TONSILLECTOMY & ADENOIDECTOMY           No Known Allergies     Social History:  Social History     Socioeconomic History     Marital  "status:      Spouse name: Sunil     Number of children: 0     Years of education: 18     Highest education level: Not on file   Occupational History     Occupation:      Employer: [employer]     Employer: [employer]   Tobacco Use     Smoking status: Never Smoker     Smokeless tobacco: Never Used     Tobacco comment: non-smoking household   Vaping Use     Vaping Use: Never used   Substance and Sexual Activity     Alcohol use: Yes     Alcohol/week: 2.5 standard drinks     Comment: glass of wine 2-3 times weekly with 1 liquor drink on the weekend     Drug use: No     Sexual activity: Yes     Partners: Male     Birth control/protection: I.U.D.   Other Topics Concern     Parent/sibling w/ CABG, MI or angioplasty before 65F 55M? Not Asked      Service No     Blood Transfusions No     Caffeine Concern No     Occupational Exposure No     Hobby Hazards No     Sleep Concern Yes     Comment: COMES AND GOES     Stress Concern No     Weight Concern Yes     Special Diet No     Back Care No     Exercise Yes     Comment: GYM MEMBERSHIP 3 HOURS WEEKLY     Bike Helmet Yes     Seat Belt Yes     Self-Exams Yes   Social History Narrative     Not on file     Social Determinants of Health     Financial Resource Strain: Not on file   Food Insecurity: Not on file   Transportation Needs: Not on file   Physical Activity: Not on file   Stress: Not on file   Social Connections: Not on file   Intimate Partner Violence: Not on file   Housing Stability: Not on file        ROS:  The 10 point review of systems is negative other than noted in the HPI and above.    PE:  Vitals: Ht 1.588 m (5' 2.5\")   Wt 75 kg (165 lb 6.4 oz)   BMI 29.77 kg/m    General appearance: well-nourished, sitting comfortably, no apparent distress  Psych: normal affect, pleasant  HEENT:  Head normocephalic and atraumatic, pupils equal and round, conjunctivae clear, mucous membranes moist, external ears and nose normal  Neck: Supple " without thyromegaly or masses  Lungs: Respirations unlabored  Lymphatic: No cervical, or supraclavicular lymphadenopathy  Extremities: Without edema  Musculoskeletal:  Normal station and gait  Neurologic: nonfocal, grossly intact times four extremities, alert and oriented times three  Psychiatric: Mood and affect are appropriate  Skin: Without lesions or rashes    Breast:  A bilateral breast exam was performed in the supine position.. Bilateral breasts were palpated in a circumferential clockwise fashion including the supraclavicular and axillary areas.   Right breast is smaller than left. The nipple is retracted on the right and inverted. There is a 4-5cm area of increased density directly behind NAC with indiscrete edges. There is mild skin ecchymosis at 6:00. No masses on the left. Nipple everted.     Lymph:       No supraclavicular/infraclavicular adenopathy.   Axillary adenopathy: none    Assessment:  Right breast Invasive lobular carcinoma, grade 2 with LCIS, ER 98%, CO 90%, HER 2 negative at 12:00, 2cm FN and at 6:00, 4cm FN.     Plan:   Danielle Thrasher is a 51 year old female has newly diagnosed right breast cancer.  I reviewed the imaging and pathology reports with her and her  and explained the findings.  We talked about the fact that this is invasive lobular carcinoma  that is large in size and was found on screening mammogram.   We discussed the receptor status of strongly Er/CO positive and HER 2 negative. We discussed that breast cancer is treated in a multidisciplinary fashion and she will also meet with oncology as well as radiation oncology pending surgical decision making and final pathology results. She has an appt with Dr. Huitron with medical oncology next week.     We next discussed the surgical options for treatment.  I described the procedures for lumpectomy with sentinel lymph node biopsy and mastectomy with sentinel lymph node biopsy, possible axillary node dissection including  the details of the procedures, the risks, anesthesia and expected recovery.  Given the span of disease, I would recommend a mastectomy with SLNB. She is not a candidate for lumpectomy unfortunately.     I advised that lymph node biopsy is recommended whenever we are dealing with invasive breast cancer and described the procedure for sentinel lymph node biopsy.  We talked about the risk for lymphedema which is small with removal of only a few nodes, but certainly not zero.     We talked about post-lumpectomy radiation, the course and usual side effects. We discussed that with lumpectomy, radiation is typically recommended to decrease risk of recurrence. It may be necessary following mastectomy depending on final pathology and if antonino involvement is present.     We also talked about post-mastectomy reconstruction and the stages involved. We also discussed the various types of mastectomy, including total, skin-sparing, and nipple-sparing mastectomy.  We reviewed that the nipple-sparing technique is cosmetic; sensation and contractility will likely be lost.  Danielle is NOT a candidate for nipple-sparing mastectomy from an oncologic perspective.  The option of having immediate versus delayed reconstruction was also discussed.   We reviewed that the advantages of immediate reconstruction includes superior cosmetics, as the skin is preserved.      In addition, I have recommended genetic counseling.  She would be a candidate in that situation based on her family history and new diagnosis.  The natural history of BRCA mutations and breast cancer were discussed with the patient. Should a deleterious mutation be identified, she would no longer be a good candidate for breast conservation.  We also reviewed the risk reduction benefits of a prophylactic mastectomy in this situation.      We discussed the role of oncotype for hormone positive, HER 2 negative cancers with negative sentinel nodes or 1-3 positive nodes.     Plan:    Plastic surgery referral (1/7 with Dr. Hernandez)  Coordinate surgery - plan for Right mastectomy with right SLNB  Appt with Dr. Huitron 1/4  Remove IUD  Genetics referral placed      60 minutes total time spent on the date of this encounter doing: chart review, review of test results, patient visit, physical exam, education, counseling, developing plan of care, and documenting.    Yazmin Krueger MD      Please route or send letter to:  Primary Care Provider (PCP) and Referring Provider           Again, thank you for allowing me to participate in the care of your patient.        Sincerely,        Yazmin Krueger MD     rodolfo gardner

## 2023-08-18 ENCOUNTER — ANCILLARY PROCEDURE (OUTPATIENT)
Dept: BONE DENSITY | Facility: CLINIC | Age: 53
End: 2023-08-18
Attending: INTERNAL MEDICINE
Payer: COMMERCIAL

## 2023-08-18 ENCOUNTER — ANCILLARY PROCEDURE (OUTPATIENT)
Dept: MAMMOGRAPHY | Facility: CLINIC | Age: 53
End: 2023-08-18
Attending: INTERNAL MEDICINE
Payer: COMMERCIAL

## 2023-08-18 DIAGNOSIS — Z17.0 MALIGNANT NEOPLASM OF LOWER-OUTER QUADRANT OF RIGHT BREAST OF FEMALE, ESTROGEN RECEPTOR POSITIVE (H): ICD-10-CM

## 2023-08-18 DIAGNOSIS — C50.511 MALIGNANT NEOPLASM OF LOWER-OUTER QUADRANT OF RIGHT BREAST OF FEMALE, ESTROGEN RECEPTOR POSITIVE (H): ICD-10-CM

## 2023-08-18 DIAGNOSIS — Z13.820 SCREENING FOR OSTEOPOROSIS: ICD-10-CM

## 2023-08-18 PROCEDURE — 77067 SCR MAMMO BI INCL CAD: CPT | Mod: 52 | Performed by: STUDENT IN AN ORGANIZED HEALTH CARE EDUCATION/TRAINING PROGRAM

## 2023-08-18 PROCEDURE — 77080 DXA BONE DENSITY AXIAL: CPT | Performed by: RADIOLOGY

## 2023-08-18 PROCEDURE — 77063 BREAST TOMOSYNTHESIS BI: CPT | Mod: 52 | Performed by: STUDENT IN AN ORGANIZED HEALTH CARE EDUCATION/TRAINING PROGRAM

## 2023-08-22 ENCOUNTER — ONCOLOGY VISIT (OUTPATIENT)
Dept: ONCOLOGY | Facility: CLINIC | Age: 53
End: 2023-08-22
Payer: COMMERCIAL

## 2023-08-22 VITALS
BODY MASS INDEX: 33.18 KG/M2 | OXYGEN SATURATION: 95 % | SYSTOLIC BLOOD PRESSURE: 111 MMHG | DIASTOLIC BLOOD PRESSURE: 73 MMHG | HEART RATE: 70 BPM | RESPIRATION RATE: 16 BRPM | WEIGHT: 187.3 LBS

## 2023-08-22 DIAGNOSIS — M85.89 OSTEOPENIA OF MULTIPLE SITES: ICD-10-CM

## 2023-08-22 DIAGNOSIS — C50.511 MALIGNANT NEOPLASM OF LOWER-OUTER QUADRANT OF RIGHT BREAST OF FEMALE, ESTROGEN RECEPTOR POSITIVE (H): Primary | ICD-10-CM

## 2023-08-22 DIAGNOSIS — Z17.0 MALIGNANT NEOPLASM OF LOWER-OUTER QUADRANT OF RIGHT BREAST OF FEMALE, ESTROGEN RECEPTOR POSITIVE (H): Primary | ICD-10-CM

## 2023-08-22 PROCEDURE — 99214 OFFICE O/P EST MOD 30 MIN: CPT | Performed by: INTERNAL MEDICINE

## 2023-08-22 ASSESSMENT — PAIN SCALES - GENERAL: PAINLEVEL: NO PAIN (1)

## 2023-08-22 NOTE — PROGRESS NOTES
[facility] Hematology / Oncology  Progress Note 2023  Name: Danielle Thrasher  :  1970    MRN:  [id number]    --------------------    Assessment / Plan:  Stage IIA (pT2 pN1a cM0), hormone positive, HER-2 negative, right-sided breast cancer.  # Right breast mastectomy with sentinel lymph node 2022.  # Oncotype Dx score 11; chemotherapy not indicated.  # Adjuvant right chest wall and region antonino radiation 50 Gy over 25 frxn Apr - May 2022.  # Adjuvant Tamoxifen May 2022 - ongoing.    Continue tamoxifen; planning 10 years.  Continue calcium and vitamin D for osteopenia; non-smoker, incredibly active.  We reviewed bone strengthening agents and are holding at this time.  Planning repeat DEXA scan in 2 years.  Planning FSH/LH upon recheck in 6 months; difficult to determine menopause absence of symptoms and IUD in place.    Enrrique Huitron MD    --------------------    Interval History:  Danielle returns for follow up of breast cancer.  All in all, enjoying good health.  Looking forward to an endurance run as well as a trail race in [place].  No major side effects from tamoxifen.  A little early in Wegovy but has not noticed any weight loss yet..  IUD in place and no menses.  No other hot flashes or perimenopausal symptoms.    Family History Cancer:  Mother - breast cancer treated w/ lumpectomy and radiation complicated by angiosarcoma later treated w/ mastectomy and radiation.    Social History:  Social History     Tobacco Use    Smoking status: Never    Smokeless tobacco: Never    Tobacco comments:     non-smoking household   Vaping Use    Vaping Use: Never used   Substance Use Topics    Alcohol use: Yes     Alcohol/week: 2.5 standard drinks of alcohol     Comment: glass of wine 2-3 times weekly with 1 liquor drink on the weekend    Drug use: No       --------------------    Physical Exam:  VS: /73   Pulse 70   Resp 16   Wt 85 kg (187 lb 4.8 oz)   SpO2 95%   BMI 33.18  kg/m  .  GEN: Well appearing.  BREAST: No palpable breast masses or axillary adenopathy.    Labs / Imaging:  Reviewed mammogram and DEXA scan.

## 2023-08-22 NOTE — LETTER
2023         RE: Danielle Thrasher  [address]  [place] [address]        Dear Colleague,    Thank you for referring your patient, Danielle Thrasher, to the [facility]. Please see a copy of my visit note below.    [facility] Hematology / Oncology  Progress Note 2023  Name: Danielle Thrasher  :  1970    MRN:  [id number]    --------------------    Assessment / Plan:  Stage IIA (pT2 pN1a cM0), hormone positive, HER-2 negative, right-sided breast cancer.  # Right breast mastectomy with sentinel lymph node 2022.  # Oncotype Dx score 11; chemotherapy not indicated.  # Adjuvant right chest wall and region antonino radiation 50 Gy over 25 frxn Apr - May 2022.  # Adjuvant Tamoxifen May 2022 - ongoing.    Continue tamoxifen; planning 10 years.  Continue calcium and vitamin D for osteopenia; non-smoker, incredibly active.  We reviewed bone strengthening agents and are holding at this time.  Planning repeat DEXA scan in 2 years.  Planning FSH/LH upon recheck in 6 months; difficult to determine menopause absence of symptoms and IUD in place.    Enrrique Huitron MD    --------------------    Interval History:  Danielle returns for follow up of breast cancer.  All in all, enjoying good health.  Looking forward to an endurance run as well as a trail race in [place].  No major side effects from tamoxifen.  A little early in Wegovy but has not noticed any weight loss yet..  IUD in place and no menses.  No other hot flashes or perimenopausal symptoms.    Family History Cancer:  Mother - breast cancer treated w/ lumpectomy and radiation complicated by angiosarcoma later treated w/ mastectomy and radiation.    Social History:  Social History     Tobacco Use     Smoking status: Never     Smokeless tobacco: Never     Tobacco comments:     non-smoking household   Vaping Use     Vaping Use: Never used   Substance Use Topics     Alcohol use: Yes      Alcohol/week: 2.5 standard drinks of alcohol     Comment: glass of wine 2-3 times weekly with 1 liquor drink on the weekend     Drug use: No       --------------------    Physical Exam:  VS: /73   Pulse 70   Resp 16   Wt 85 kg (187 lb 4.8 oz)   SpO2 95%   BMI 33.18 kg/m  .  GEN: Well appearing.  BREAST: No palpable breast masses or axillary adenopathy.    Labs / Imaging:  Reviewed mammogram and DEXA scan.      Again, thank you for allowing me to participate in the care of your patient.        Sincerely,        Enrrique Huitron MD

## 2023-08-22 NOTE — NURSING NOTE
"Oncology Rooming Note    August 22, 2023 10:09 AM   Danielle Thrasher is a 53 year old female who presents for:    Chief Complaint   Patient presents with    Oncology Clinic Visit     6 month follow up     Initial Vitals: /73   Pulse 70   Resp 16   Wt 85 kg (187 lb 4.8 oz)   SpO2 95%   BMI 33.18 kg/m   Estimated body mass index is 33.18 kg/m  as calculated from the following:    Height as of 3/1/22: 1.6 m (5' 3\").    Weight as of this encounter: 85 kg (187 lb 4.8 oz). Body surface area is 1.94 meters squared.  No Pain (1) Comment: Data Unavailable   No LMP recorded. (Menstrual status: IUD).  Allergies reviewed: Yes  Medications reviewed: Yes    Medications: Medication refills not needed today.  Pharmacy name entered into [employer]:    [id number] [place], [place] - [address] AT [facility] [place]  [facility] [place] [address] [facility] - [facility] [place] - [facility]    Clinical concerns: No new concerns         Earlene Borden LPN              "

## 2023-10-09 DIAGNOSIS — Z71.3 WEIGHT LOSS COUNSELING, ENCOUNTER FOR: ICD-10-CM

## 2023-10-10 RX ORDER — SEMAGLUTIDE 0.25 MG/.5ML
INJECTION, SOLUTION SUBCUTANEOUS
Qty: 2 ML | Refills: 1 | Status: SHIPPED | OUTPATIENT
Start: 2023-10-10 | End: 2024-04-12

## 2023-10-13 ENCOUNTER — TELEPHONE (OUTPATIENT)
Dept: FAMILY MEDICINE | Facility: CLINIC | Age: 53
End: 2023-10-13
Payer: COMMERCIAL

## 2023-10-13 NOTE — TELEPHONE ENCOUNTER
Prior Authorization Approval    Medication: WEGOVY 0.25 MG/0.5ML SC SOAJ  Authorization Effective Date: 9/13/2023  Authorization Expiration Date: 5/10/2024  Approved Dose/Quantity: 2mls  Reference #:     Insurance Company:    Expected CoPay: $    CoPay Card Available:      Financial Assistance Needed:   Which Pharmacy is filling the prescription: [facility] - [place], [place] - [address]  Pharmacy Notified: Yes  Patient Notified: Pharmacy will notify patient.

## 2023-10-13 NOTE — TELEPHONE ENCOUNTER
Prior Authorization Retail Medication Request    Medication/Dose: WEGOVY 0.25 MG/0.5ML pen   ICD code (if different than what is on RX):    Previously Tried and Failed:    Rationale:      Insurance Name:  [employer]  Insurance ID:  [id number]       Pharmacy Information (if different than what is on RX)  Name:  [facility]   Phone:  [phone]       Deborah VILLALOBOS MA

## 2023-11-28 NOTE — TELEPHONE ENCOUNTER
Pt message and stated insurance is looking for more information even though this has already been approved.      Can PA team look into this?      Yanique, RN    Triage Nurse  [facility]

## 2023-11-29 ENCOUNTER — TELEPHONE (OUTPATIENT)
Dept: FAMILY MEDICINE | Facility: CLINIC | Age: 53
End: 2023-11-29

## 2023-11-29 NOTE — TELEPHONE ENCOUNTER
I called insurance 11/29/2023, and the rep looked into this.  She sees the approval through 05/1/224, but nothing else.  She cannot see that they need any further information.  Thank You  Central Prior Authorization Team   Phone: [phone]

## 2023-11-29 NOTE — TELEPHONE ENCOUNTER
Called [facility] as they have been reaching out to patient and requesting further information from clinic.    Pharmacy tech had clarified that there may be a quantity limit prior authorization required in addition to the prior authorization already completed, so that insurance will cover more than 4 mLs in a year. As of now, the patient is only able to have 4 mLs of Wegovy covered for every 365 days.     Routing to PA team to please assist with completing quantity limit prior authorization and submitting it to the pharmacy.    CARL PosadaN RN  [facility]

## 2023-11-29 NOTE — TELEPHONE ENCOUNTER
Pt already informed- closing encounter      Yanique RN    Triage Nurse  [facility]

## 2023-12-12 NOTE — TELEPHONE ENCOUNTER
Central Prior Authorization Team   Phone: [phone]        Received transferred call - per the patient she has been missed     As and of August, she has not taken Wegovy.  She has missed two months worth and was not able to finish the titration therapy/start on the therapy.    She was able start the therapy in the Summer, but could not continue.    I informed her of the process and I would send this information to [facility].    I did inform her this could get send out today.  She did say that she would check back in by the end of the week.

## 2023-12-12 NOTE — TELEPHONE ENCOUNTER
PRIOR AUTHORIZATION DENIED    Medication: WEGOVY 0.25 MG/0.5ML SC SOAJ  Insurance Company: [employer] Non-Specialty PA's - Phone [phone] Fax [phone]  Denial Date: 12/12/2023  Denial Reason(s): Only 4mls allowed per rolling 365 days. They would only approve a qty limit over ride if the patient had missed more than two doses and needed to reinitiate treatment.  Appeal Information:   Patient Notified: Pharmacy will notify patient.

## 2023-12-12 NOTE — TELEPHONE ENCOUNTER
Prior Authorization Approval    Medication: WEGOVY 0.25 MG/0.5ML SC SOAJ  Authorization Effective Date: 11/12/2023  Authorization Expiration Date: 1/11/2024  Approved Dose/Quantity: 2mls/28 Days  Reference #:     Insurance Company: [employer]-Specialty PA's - Phone [phone] Fax [phone]  Expected CoPay: $    CoPay Card Available:      Financial Assistance Needed: No  Which Pharmacy is filling the prescription: [facility] - [place], [place] - [address]  Pharmacy Notified: Yes  Patient Notified: I called and told patient about the approval, she will be calling [employer] to order her medication.

## 2023-12-15 ENCOUNTER — ALLIED HEALTH/NURSE VISIT (OUTPATIENT)
Dept: FAMILY MEDICINE | Facility: CLINIC | Age: 53
End: 2023-12-15
Payer: COMMERCIAL

## 2023-12-15 DIAGNOSIS — Z23 ENCOUNTER FOR IMMUNIZATION: Primary | ICD-10-CM

## 2023-12-15 PROCEDURE — 90750 HZV VACC RECOMBINANT IM: CPT

## 2023-12-15 PROCEDURE — 90471 IMMUNIZATION ADMIN: CPT

## 2023-12-15 PROCEDURE — 99207 PR NO CHARGE NURSE ONLY: CPT

## 2023-12-15 NOTE — PROGRESS NOTES
Prior to immunization administration, verified patients identity using patient s name and date of birth. Please see Immunization Activity for additional information.     Screening Questionnaire for Adult Immunization    Are you sick today?   No   Do you have allergies to medications, food, a vaccine component or latex?   No   Have you ever had a serious reaction after receiving a vaccination?   No   Do you have a long-term health problem with heart, lung, kidney, or metabolic disease (e.g., diabetes), asthma, a blood disorder, no spleen, complement component deficiency, a cochlear implant, or a spinal fluid leak?  Are you on long-term aspirin therapy?   No   Do you have cancer, leukemia, HIV/AIDS, or any other immune system problem?   No   Do you have a parent, brother, or sister with an immune system problem?   No   In the past 3 months, have you taken medications that affect  your immune system, such as prednisone, other steroids, or anticancer drugs; drugs for the treatment of rheumatoid arthritis, Crohn s disease, or psoriasis; or have you had radiation treatments?   No   Have you had a seizure, or a brain or other nervous system problem?   No   During the past year, have you received a transfusion of blood or blood    products, or been given immune (gamma) globulin or antiviral drug?   No   For women: Are you pregnant or is there a chance you could become       pregnant during the next month?   No   Have you received any vaccinations in the past 4 weeks?   No     Immunization questionnaire answers were all negative.    I have reviewed the following standing orders:   This patient is due and qualifies for the Zoster vaccine.    Click here for Zoster Standing Order    I have reviewed the vaccines inclusion and exclusion criteria; No concerns regarding eligibility.     Patient instructed to remain in clinic for 15 minutes afterwards, and to report any adverse reactions.     Screening performed by Elaina APPLE  ALYSON Pederson on 12/15/2023 at 1:33 PM.

## 2023-12-18 DIAGNOSIS — F41.1 GAD (GENERALIZED ANXIETY DISORDER): ICD-10-CM

## 2023-12-19 RX ORDER — CITALOPRAM HYDROBROMIDE 40 MG/1
40 TABLET ORAL DAILY
Qty: 90 TABLET | Refills: 0 | Status: SHIPPED | OUTPATIENT
Start: 2023-12-19 | End: 2024-03-18

## 2024-01-08 ASSESSMENT — ASTHMA QUESTIONNAIRES
QUESTION_2 LAST FOUR WEEKS HOW OFTEN HAVE YOU HAD SHORTNESS OF BREATH: ONCE OR TWICE A WEEK
QUESTION_1 LAST FOUR WEEKS HOW MUCH OF THE TIME DID YOUR ASTHMA KEEP YOU FROM GETTING AS MUCH DONE AT WORK, SCHOOL OR AT HOME: A LITTLE OF THE TIME
QUESTION_4 LAST FOUR WEEKS HOW OFTEN HAVE YOU USED YOUR RESCUE INHALER OR NEBULIZER MEDICATION (SUCH AS ALBUTEROL): NOT AT ALL
QUESTION_3 LAST FOUR WEEKS HOW OFTEN DID YOUR ASTHMA SYMPTOMS (WHEEZING, COUGHING, SHORTNESS OF BREATH, CHEST TIGHTNESS OR PAIN) WAKE YOU UP AT NIGHT OR EARLIER THAN USUAL IN THE MORNING: NOT AT ALL
ACT_TOTALSCORE: 22
ACT_TOTALSCORE: 22
QUESTION_5 LAST FOUR WEEKS HOW WOULD YOU RATE YOUR ASTHMA CONTROL: WELL CONTROLLED

## 2024-01-09 ENCOUNTER — VIRTUAL VISIT (OUTPATIENT)
Dept: FAMILY MEDICINE | Facility: CLINIC | Age: 54
End: 2024-01-09
Payer: COMMERCIAL

## 2024-01-09 DIAGNOSIS — Z71.3 WEIGHT LOSS COUNSELING, ENCOUNTER FOR: ICD-10-CM

## 2024-01-09 PROBLEM — C50.919 MALIGNANT NEOPLASM OF BREAST (H): Status: ACTIVE | Noted: 2024-01-09

## 2024-01-09 PROCEDURE — 99213 OFFICE O/P EST LOW 20 MIN: CPT | Mod: 95 | Performed by: FAMILY MEDICINE

## 2024-01-09 RX ORDER — SEMAGLUTIDE 0.25 MG/.5ML
INJECTION, SOLUTION SUBCUTANEOUS
Qty: 2 ML | Refills: 1 | Status: CANCELLED | OUTPATIENT
Start: 2024-01-09

## 2024-01-09 NOTE — PROGRESS NOTES
Danielle is a 53 year old who is being evaluated via a billable video visit.      How would you like to obtain your AVS? MyChart  If the video visit is dropped, the invitation should be resent by: Text to cell phone: [phone]  Will anyone else be joining your video visit? No          Assessment & Plan       ICD-10-CM    1. Weight loss counseling, encounter for  Z71.3 Semaglutide-Weight Management (WEGOVY) 0.5 MG/0.5ML pen            Review of external notes as documented elsewhere in note             Tay Lieberman MD  [facility] BRIANA Santos is a 53 year old, presenting for the following health issues:  Refill Request      1/9/2024    10:57 AM   Additional Questions   Roomed by Gayle   Accompanied by none         1/9/2024    10:57 AM   Patient Reported Additional Medications   Patient reports taking the following new medications none     Denies any side effects and has been helping    History of Present Illness       Reason for visit:  Wegovy prescription update    She eats 4 or more servings of fruits and vegetables daily.She consumes 1 sweetened beverage(s) daily.She exercises with enough effort to increase her heart rate 30 to 60 minutes per day.  She exercises with enough effort to increase her heart rate 5 days per week.   She is taking medications regularly.     Wt Readings from Last 4 Encounters:   08/22/23 85 kg (187 lb 4.8 oz)   11/23/22 80.3 kg (177 lb)   10/20/22 79.6 kg (175 lb 6.4 oz)   05/09/22 77.7 kg (171 lb 4.8 oz)     184 lb currently  Around 200lb at time of first dose            Review of Systems   Constitutional, HEENT, cardiovascular, pulmonary, gi and gu systems are negative, except as otherwise noted.      Objective           Vitals:  No vitals were obtained today due to virtual visit.    Physical Exam   GENERAL: Healthy, alert and no distress  EYES: Eyes grossly normal to inspection.  No discharge or erythema, or obvious scleral/conjunctival  abnormalities.  RESP: No audible wheeze, cough, or visible cyanosis.  No visible retractions or increased work of breathing.    SKIN: Visible skin clear. No significant rash, abnormal pigmentation or lesions.  NEURO: Cranial nerves grossly intact.  Mentation and speech appropriate for age.  PSYCH: Mentation appears normal, affect normal/bright, judgement and insight intact, normal speech and appearance well-groomed.                Video-Visit Details    Type of service:  Video Visit     Originating Location (pt. Location): Home    Distant Location (provider location):  On-site  Platform used for Video Visit: Bob

## 2024-02-08 DIAGNOSIS — C50.812 MALIGNANT NEOPLASM OF OVERLAPPING SITES OF LEFT BREAST IN FEMALE, ESTROGEN RECEPTOR POSITIVE (H): ICD-10-CM

## 2024-02-08 DIAGNOSIS — Z17.0 MALIGNANT NEOPLASM OF OVERLAPPING SITES OF LEFT BREAST IN FEMALE, ESTROGEN RECEPTOR POSITIVE (H): ICD-10-CM

## 2024-02-09 RX ORDER — TAMOXIFEN CITRATE 20 MG/1
TABLET ORAL
Qty: 90 TABLET | Refills: 3 | Status: SHIPPED | OUTPATIENT
Start: 2024-02-09 | End: 2024-03-12

## 2024-02-09 NOTE — TELEPHONE ENCOUNTER
"SUBJECTIVE/OBJECTIVE:                                                    Refill request receive for:  tamoxifen (NOLVADEX) 20 MG tablet     Last refill per fax: 2/15/2023  Date of LOV r/t Medication: 8/22/2023  Future appt scheduled? No - message sent to patient requesting to schedule    RECENT LABS/VITALS                                                      Recent Labs   Lab Test 02/22/23  0927 06/28/22  1152   CHOL 133 121   HDL 51 60   LDL 48 42   TRIG 172* 97     Lab Results   Component Value Date    AST 13 02/22/2023    AST 28 10/05/2013     Lab Results   Component Value Date    ALT 15 02/22/2023    ALT 32 10/05/2013     No results found for: \"A1C\"  Creatinine   Date Value Ref Range Status   02/22/2023 0.74 0.52 - 1.04 mg/dL Final   10/05/2013 0.98 0.52 - 1.04 mg/dL Final   ]  Potassium   Date Value Ref Range Status   02/22/2023 3.7 3.4 - 5.3 mmol/L Final   10/05/2013 3.7 3.4 - 5.3 mmol/L Final   ]  No results found for: \"TSH\"  BP Readings from Last 4 Encounters:   08/22/23 111/73   11/23/22 110/70   10/20/22 123/76   06/14/22 125/80       PLAN:                                                      Sent to provider to advise.    Conchis Gee RN         " [FreeTextEntry1] : Cori presents today for followup. She has not yet met with her hepatologist to determine if she wishes to continue the pregnancy. She had an episode of vaginal bleeding earlier this month. She is complaining of multiple issues, including fatigue and generalized allergic reactions.

## 2024-02-16 DIAGNOSIS — Z71.3 WEIGHT LOSS COUNSELING, ENCOUNTER FOR: ICD-10-CM

## 2024-02-20 RX ORDER — SEMAGLUTIDE 0.5 MG/.5ML
INJECTION, SOLUTION SUBCUTANEOUS
Qty: 2 ML | Refills: 2 | Status: SHIPPED | OUTPATIENT
Start: 2024-02-20 | End: 2024-04-12

## 2024-03-12 ENCOUNTER — LAB (OUTPATIENT)
Dept: INFUSION THERAPY | Facility: CLINIC | Age: 54
End: 2024-03-12
Attending: INTERNAL MEDICINE
Payer: COMMERCIAL

## 2024-03-12 ENCOUNTER — ONCOLOGY VISIT (OUTPATIENT)
Dept: ONCOLOGY | Facility: CLINIC | Age: 54
End: 2024-03-12
Attending: NURSE PRACTITIONER
Payer: COMMERCIAL

## 2024-03-12 VITALS
WEIGHT: 183.2 LBS | RESPIRATION RATE: 18 BRPM | HEART RATE: 74 BPM | BODY MASS INDEX: 32.46 KG/M2 | HEIGHT: 63 IN | OXYGEN SATURATION: 95 % | SYSTOLIC BLOOD PRESSURE: 114 MMHG | DIASTOLIC BLOOD PRESSURE: 81 MMHG | TEMPERATURE: 98.3 F

## 2024-03-12 DIAGNOSIS — Z12.31 ENCOUNTER FOR SCREENING MAMMOGRAM FOR BREAST CANCER: Primary | ICD-10-CM

## 2024-03-12 DIAGNOSIS — Z17.0 MALIGNANT NEOPLASM OF LOWER-OUTER QUADRANT OF RIGHT BREAST OF FEMALE, ESTROGEN RECEPTOR POSITIVE (H): Primary | ICD-10-CM

## 2024-03-12 DIAGNOSIS — Z79.810 LONG-TERM CURRENT USE OF TAMOXIFEN: ICD-10-CM

## 2024-03-12 DIAGNOSIS — C50.511 MALIGNANT NEOPLASM OF LOWER-OUTER QUADRANT OF RIGHT BREAST OF FEMALE, ESTROGEN RECEPTOR POSITIVE (H): Primary | ICD-10-CM

## 2024-03-12 DIAGNOSIS — E66.811 CLASS 1 OBESITY DUE TO EXCESS CALORIES WITHOUT SERIOUS COMORBIDITY IN ADULT, UNSPECIFIED BMI: ICD-10-CM

## 2024-03-12 DIAGNOSIS — Z17.0 MALIGNANT NEOPLASM OF OVERLAPPING SITES OF LEFT BREAST IN FEMALE, ESTROGEN RECEPTOR POSITIVE (H): ICD-10-CM

## 2024-03-12 DIAGNOSIS — M85.89 OSTEOPENIA OF MULTIPLE SITES: ICD-10-CM

## 2024-03-12 DIAGNOSIS — E66.09 CLASS 1 OBESITY DUE TO EXCESS CALORIES WITHOUT SERIOUS COMORBIDITY IN ADULT, UNSPECIFIED BMI: ICD-10-CM

## 2024-03-12 DIAGNOSIS — C50.812 MALIGNANT NEOPLASM OF OVERLAPPING SITES OF LEFT BREAST IN FEMALE, ESTROGEN RECEPTOR POSITIVE (H): ICD-10-CM

## 2024-03-12 LAB
FSH SERPL IRP2-ACNC: 23.4 MIU/ML
HOLD SPECIMEN: NORMAL
HOLD SPECIMEN: NORMAL
LH SERPL-ACNC: 30.3 MIU/ML

## 2024-03-12 PROCEDURE — 36415 COLL VENOUS BLD VENIPUNCTURE: CPT

## 2024-03-12 PROCEDURE — 83002 ASSAY OF GONADOTROPIN (LH): CPT

## 2024-03-12 PROCEDURE — 99214 OFFICE O/P EST MOD 30 MIN: CPT | Performed by: NURSE PRACTITIONER

## 2024-03-12 PROCEDURE — 99215 OFFICE O/P EST HI 40 MIN: CPT | Performed by: NURSE PRACTITIONER

## 2024-03-12 PROCEDURE — 83001 ASSAY OF GONADOTROPIN (FSH): CPT

## 2024-03-12 RX ORDER — TAMOXIFEN CITRATE 20 MG/1
20 TABLET ORAL DAILY
Qty: 90 TABLET | Refills: 3 | Status: SHIPPED | OUTPATIENT
Start: 2024-03-12 | End: 2024-03-12

## 2024-03-12 RX ORDER — TAMOXIFEN CITRATE 20 MG/1
20 TABLET ORAL DAILY
Qty: 90 TABLET | Refills: 3 | Status: SHIPPED | OUTPATIENT
Start: 2024-03-12 | End: 2024-09-17

## 2024-03-12 ASSESSMENT — PAIN SCALES - GENERAL: PAINLEVEL: NO PAIN (0)

## 2024-03-12 NOTE — LETTER
3/12/2024         RE: Danielle Thrasher  [address]  [place] [address]        Dear Colleague,    Thank you for referring your patient, Danielle Thrasher, to the [facility]. Please see a copy of my visit note below.    Oncology Follow Up Visit: March 12, 2024    Oncologist: Dr Enrrique Huitron  PCP: Mehnaz Cabrera    Diagnosis: Stage IIB Right Breast Cancer  Danielle Thrasher is a 52 yo female who presented in 11/2021 for screening mammogram which was abnormal to right. Found to be Stage IIA (pT2 pN1a cM0), hormone positive, HER-2 negative, right-sided breast cancer.  Right breast mastectomy with sentinel lymph node February 2022.  Oncotype Dx score 11; chemotherapy not indicated.  Treatment:   2/2022 Right Breast Mastectomy with SLN biopsy  5/2022 completed XRT to right chest and Axilla post mastectomy  5/2022 began Tamoxifen     Interval History: Ms. Thrasher comes to clinic for review of her breast cancer. Pt states she is feeling well with no new breast or chest issues. She had spacer replaced and felt things went well and is fully healed without new pain or problems. She is back to running. Not interested in tattoing the nipple. Denies recent illness, new complaints. Pt states she does wear compression sleeve often and has been through lymphedema clinic.   Pt is on semaglutide for weight loss but states bowels are normal.   Has IUD- no recent periods.   Rest of comprehensive and complete ROS is reviewed and is negative.   Past Medical History:   Diagnosis Date     Allergic rhinitis     mold     Major depressive disorder, recurrent episode (H24) 2009     Malignant neoplasm of right breast in female, estrogen receptor positive (H) 12/22/2021    Invasive Lobular     Mild persistent asthma      Patellofemoral pain syndrome      S/P radiation therapy     5,000 cGy to right chest wall + right SCV completed on 5/9/2022 - [facility]      "Tear meniscus knee 1998    left     Current Outpatient Medications   Medication     acetaminophen (TYLENOL) 500 MG tablet     albuterol (PROAIR HFA/PROVENTIL HFA/VENTOLIN HFA) 108 (90 Base) MCG/ACT inhaler     Ascorbic Acid (VITAMIN C PO)     calcium carbonate (OS-JACKSON 500 MG [place]. [place]) 500 MG tablet     cetirizine (ZYRTEC) 10 MG tablet     citalopram (CELEXA) 40 MG tablet     conjugated estrogens (PREMARIN) 0.625 MG/GM vaginal cream     fluticasone (FLONASE) 50 MCG/ACT nasal spray     IBUPROFEN PO     Magnesium 400 MG TABS     Multiple Vitamin (MULTI-VITAMIN) per tablet     paragard intrauterine copper device     Semaglutide-Weight Management (WEGOVY) 0.5 MG/0.5ML pen     tamoxifen (NOLVADEX) 20 MG tablet     WEGOVY 0.25 MG/0.5ML pen     No current facility-administered medications for this visit.     No Known Allergies    Physical Exam:/81   Pulse 74   Temp 98.3  F (36.8  C)   Resp 18   Ht 1.6 m (5' 3\")   Wt 83.1 kg (183 lb 3.2 oz)   SpO2 95%   BMI 32.45 kg/m     ECOG PS- 0  Constitutional: Alert, overweight/obese and in no distress.   ENT: Eyes bright , No mouth sores  Neck: Supple, No adenopathy.  Cardiac: Heart rate and rhythm is regular and strong without murmur  Respiratory: Breathing easy. Lung sounds clear to auscultation  Breasts:Right breast has been reconstructed and is healed well without lymphedema noted. Left breast without masses tenderness or discharge.   GI: Abdomen is rounded, soft, non-tender, BS normal. No masses or organomegaly  MS: Muscle tone normal, extremities normal with no edema.   Skin: No suspicious lesions or rashes  Neuro: Sensory grossly WNL, gait normal.   Lymph: Normal ant/post cervical, axillary, supraclavicular nodes  Psych: Mentation appears normal and affect normal/bright and smiling    Laboratory/Imaging Results: FSH/LH tested today- awaiting results  - reviewed 2/22/2023 labs CMp was normal.     Assessment and Plan:   Stage IIB Right Breast Cancer- Pt continues on " plan with Tamoxifen - Planning for 10 years of use. No new sign of concern for new cancer concern. Tolerating Tamoxifen with few hot flashes,no added vaginal discharge, signs of blood clots or other issues.   She will continue with tamoxifen but did review travel risk for blood clots with Tamoxifen and suggested compression hose or use of baby ASA on trips longer than 2 hours.   Praise for getting back to regular running which also should help with weight loss effort along with the semaglutide.   Testing hormones- FSH/LH- for menopausal status  Return in 6 months to see Dr Huitron with mammogram.   Has dense breasts and had previously discussed alternating MRI and Mammogram  but did not receive MRI of the breasts in December so will await mammogram with next visit for now.   Osteopenia- 8/2023 DEXA proved osteopenia. Confirmed use of calcium plus vitamin D. Her exercise will help with this as well. No bisphosphonate use.   - should review for colonoscopy status with next visit.   - reminded of need for annual pelvic exam  - suggested yearly ophthalmology visit with Tamoxifen use.     The total time of this encounter amounted to  40 minutes. This time included face to face time spent with the patient, prep work, ordering tests, and performing post visit documentation.  Genesis Hogan Cnp      Again, thank you for allowing me to participate in the care of your patient.        Sincerely,        Genesis Hogan NP, APRN CNP

## 2024-03-12 NOTE — PROGRESS NOTES
Oncology Follow Up Visit: March 12, 2024    Oncologist: Dr Enrrique Huitron  PCP: Mehnaz Cabrera    Diagnosis: Stage IIB Right Breast Cancer  Danielle Thrasher is a 54 yo female who presented in 11/2021 for screening mammogram which was abnormal to right. Found to be Stage IIA (pT2 pN1a cM0), hormone positive, HER-2 negative, right-sided breast cancer.  Right breast mastectomy with sentinel lymph node February 2022.  Oncotype Dx score 11; chemotherapy not indicated.  Treatment:   2/2022 Right Breast Mastectomy with SLN biopsy  5/2022 completed XRT to right chest and Axilla post mastectomy  5/2022 began Tamoxifen     Interval History: Ms. Thrasher comes to clinic for review of her breast cancer. Pt states she is feeling well with no new breast or chest issues. She had spacer replaced and felt things went well and is fully healed without new pain or problems. She is back to running. Not interested in tattoing the nipple. Denies recent illness, new complaints. Pt states she does wear compression sleeve often and has been through lymphedema clinic.   Pt is on semaglutide for weight loss but states bowels are normal.   Has IUD- no recent periods.   Rest of comprehensive and complete ROS is reviewed and is negative.   Past Medical History:   Diagnosis Date    Allergic rhinitis     mold    Major depressive disorder, recurrent episode (H24) 2009    Malignant neoplasm of right breast in female, estrogen receptor positive (H) 12/22/2021    Invasive Lobular    Mild persistent asthma     Patellofemoral pain syndrome     S/P radiation therapy     5,000 cGy to right chest wall + right SCV completed on 5/9/2022 - [facility]    Tear meniscus knee 1998    left     Current Outpatient Medications   Medication    acetaminophen (TYLENOL) 500 MG tablet    albuterol (PROAIR HFA/PROVENTIL HFA/VENTOLIN HFA) 108 (90 Base) MCG/ACT inhaler    Ascorbic Acid (VITAMIN C PO)    calcium carbonate (OS-JACKSON 500 MG [place]. [place])  "500 MG tablet    cetirizine (ZYRTEC) 10 MG tablet    citalopram (CELEXA) 40 MG tablet    conjugated estrogens (PREMARIN) 0.625 MG/GM vaginal cream    fluticasone (FLONASE) 50 MCG/ACT nasal spray    IBUPROFEN PO    Magnesium 400 MG TABS    Multiple Vitamin (MULTI-VITAMIN) per tablet    paragard intrauterine copper device    Semaglutide-Weight Management (WEGOVY) 0.5 MG/0.5ML pen    tamoxifen (NOLVADEX) 20 MG tablet    WEGOVY 0.25 MG/0.5ML pen     No current facility-administered medications for this visit.     No Known Allergies    Physical Exam:/81   Pulse 74   Temp 98.3  F (36.8  C)   Resp 18   Ht 1.6 m (5' 3\")   Wt 83.1 kg (183 lb 3.2 oz)   SpO2 95%   BMI 32.45 kg/m     ECOG PS- 0  Constitutional: Alert, overweight/obese and in no distress.   ENT: Eyes bright , No mouth sores  Neck: Supple, No adenopathy.  Cardiac: Heart rate and rhythm is regular and strong without murmur  Respiratory: Breathing easy. Lung sounds clear to auscultation  Breasts:Right breast has been reconstructed and is healed well without lymphedema noted. Left breast without masses tenderness or discharge.   GI: Abdomen is rounded, soft, non-tender, BS normal. No masses or organomegaly  MS: Muscle tone normal, extremities normal with no edema.   Skin: No suspicious lesions or rashes  Neuro: Sensory grossly WNL, gait normal.   Lymph: Normal ant/post cervical, axillary, supraclavicular nodes  Psych: Mentation appears normal and affect normal/bright and smiling    Laboratory/Imaging Results: FSH/LH tested today- awaiting results  - reviewed 2/22/2023 labs CMp was normal.     Assessment and Plan:   Stage IIB Right Breast Cancer- Pt continues on plan with Tamoxifen - Planning for 10 years of use. No new sign of concern for new cancer concern. Tolerating Tamoxifen with few hot flashes,no added vaginal discharge, signs of blood clots or other issues.   She will continue with tamoxifen but did review travel risk for blood clots with " Tamoxifen and suggested compression hose or use of baby ASA on trips longer than 2 hours.   Praise for getting back to regular running which also should help with weight loss effort along with the semaglutide.   Testing hormones- FSH/LH- for menopausal status  Return in 6 months to see Dr Huitron with mammogram.   Has dense breasts and had previously discussed alternating MRI and Mammogram  but did not receive MRI of the breasts in December so will await mammogram with next visit for now.   Osteopenia- 8/2023 DEXA proved osteopenia. Confirmed use of calcium plus vitamin D. Her exercise will help with this as well. No bisphosphonate use.   - should review for colonoscopy status with next visit.   - reminded of need for annual pelvic exam  - suggested yearly ophthalmology visit with Tamoxifen use.     The total time of this encounter amounted to  40 minutes. This time included face to face time spent with the patient, prep work, ordering tests, and performing post visit documentation.  Genesis Hogan,Cnp

## 2024-03-12 NOTE — NURSING NOTE
"Oncology Rooming Note    March 12, 2024 8:49 AM   Danielle Thrasher is a 53 year old female who presents for:    Chief Complaint   Patient presents with    Oncology Clinic Visit     Follow up     Initial Vitals: /81   Pulse 74   Temp 98.3  F (36.8  C)   Resp 18   Ht 1.6 m (5' 3\")   Wt 83.1 kg (183 lb 3.2 oz)   SpO2 95%   BMI 32.45 kg/m   Estimated body mass index is 32.45 kg/m  as calculated from the following:    Height as of this encounter: 1.6 m (5' 3\").    Weight as of this encounter: 83.1 kg (183 lb 3.2 oz). Body surface area is 1.92 meters squared.  No Pain (0) Comment: Data Unavailable   No LMP recorded. (Menstrual status: IUD).  Allergies reviewed: Yes  Medications reviewed: Yes    Medications: Medication refills not needed today.  Pharmacy name entered into [employer]:    [id number] [place], [place] - [address] AT [facility] [address]  [facility] [place] [facility] [address] [facility] - [facility] [place] - [address]    Frailty Screening:   Is the patient here for a new oncology consult visit in cancer care? 2. No      Clinical concerns: No Concerns        Jordan Williamson MA            "

## 2024-03-15 ENCOUNTER — VIRTUAL VISIT (OUTPATIENT)
Dept: FAMILY MEDICINE | Facility: CLINIC | Age: 54
End: 2024-03-15
Payer: COMMERCIAL

## 2024-03-15 DIAGNOSIS — Z71.3 WEIGHT LOSS COUNSELING, ENCOUNTER FOR: Primary | ICD-10-CM

## 2024-03-15 PROCEDURE — 99214 OFFICE O/P EST MOD 30 MIN: CPT | Mod: 95 | Performed by: FAMILY MEDICINE

## 2024-03-15 NOTE — PROGRESS NOTES
"Danielle is a 53 year old who is being evaluated via a billable video visit.    How would you like to obtain your AVS? MyChart  If the video visit is dropped, the invitation should be resent by: Text to cell phone: [phone]  Will anyone else be joining your video visit? No      Assessment & Plan       ICD-10-CM    1. Weight loss counseling, encounter for  Z71.3 Semaglutide-Weight Management (WEGOVY) 1 MG/0.5ML pen            Review of external notes as documented elsewhere in note        BMI  Estimated body mass index is 32.45 kg/m  as calculated from the following:    Height as of 3/12/24: 1.6 m (5' 3\").    Weight as of 3/12/24: 83.1 kg (183 lb 3.2 oz).   Weight management plan: Discussed healthy diet and exercise guidelines      There are no Patient Instructions on file for this visit.    Subjective   Danielle is a 53 year old, presenting for the following health issues:  Recheck Medication (Change of Wegovy dose due to side effects)      3/15/2024     3:47 PM   Additional Questions   Roomed by Gayle   Accompanied by none         3/15/2024     3:47 PM   Patient Reported Additional Medications   Patient reports taking the following new medications none     History of Present Illness       Reason for visit:  Prescription dosage change    She eats 4 or more servings of fruits and vegetables daily.She consumes 1 sweetened beverage(s) daily.She exercises with enough effort to increase her heart rate 30 to 60 minutes per day.  She exercises with enough effort to increase her heart rate 5 days per week.   She is taking medications regularly.               Review of Systems  Constitutional, HEENT, cardiovascular, pulmonary, gi and gu systems are negative, except as otherwise noted.      Objective           Vitals:  No vitals were obtained today due to virtual visit.    Physical Exam   GENERAL: alert and no distress  EYES: Eyes grossly normal to inspection.  No discharge or erythema, or obvious scleral/conjunctival " abnormalities.  RESP: No audible wheeze, cough, or visible cyanosis.    SKIN: Visible skin clear. No significant rash, abnormal pigmentation or lesions.  NEURO: Cranial nerves grossly intact.  Mentation and speech appropriate for age.  PSYCH: Appropriate affect, tone, and pace of words          Video-Visit Details    Type of service:  Video Visit   Originating Location (pt. Location): Home    Distant Location (provider location):  On-site  Platform used for Video Visit: Bob  Signed Electronically by: Tay Lieberman MD

## 2024-03-17 DIAGNOSIS — F41.1 GAD (GENERALIZED ANXIETY DISORDER): ICD-10-CM

## 2024-03-18 RX ORDER — CITALOPRAM HYDROBROMIDE 40 MG/1
40 TABLET ORAL DAILY
Qty: 90 TABLET | Refills: 0 | Status: SHIPPED | OUTPATIENT
Start: 2024-03-18 | End: 2024-06-17

## 2024-04-11 ASSESSMENT — ANXIETY QUESTIONNAIRES
4. TROUBLE RELAXING: NOT AT ALL
6. BECOMING EASILY ANNOYED OR IRRITABLE: NOT AT ALL
7. FEELING AFRAID AS IF SOMETHING AWFUL MIGHT HAPPEN: NOT AT ALL
2. NOT BEING ABLE TO STOP OR CONTROL WORRYING: SEVERAL DAYS
8. IF YOU CHECKED OFF ANY PROBLEMS, HOW DIFFICULT HAVE THESE MADE IT FOR YOU TO DO YOUR WORK, TAKE CARE OF THINGS AT HOME, OR GET ALONG WITH OTHER PEOPLE?: SOMEWHAT DIFFICULT
IF YOU CHECKED OFF ANY PROBLEMS ON THIS QUESTIONNAIRE, HOW DIFFICULT HAVE THESE PROBLEMS MADE IT FOR YOU TO DO YOUR WORK, TAKE CARE OF THINGS AT HOME, OR GET ALONG WITH OTHER PEOPLE: SOMEWHAT DIFFICULT
5. BEING SO RESTLESS THAT IT IS HARD TO SIT STILL: NOT AT ALL
1. FEELING NERVOUS, ANXIOUS, OR ON EDGE: SEVERAL DAYS
GAD7 TOTAL SCORE: 3
7. FEELING AFRAID AS IF SOMETHING AWFUL MIGHT HAPPEN: NOT AT ALL
GAD7 TOTAL SCORE: 3
3. WORRYING TOO MUCH ABOUT DIFFERENT THINGS: SEVERAL DAYS

## 2024-04-12 ENCOUNTER — VIRTUAL VISIT (OUTPATIENT)
Dept: FAMILY MEDICINE | Facility: CLINIC | Age: 54
End: 2024-04-12
Payer: COMMERCIAL

## 2024-04-12 DIAGNOSIS — C50.511 MALIGNANT NEOPLASM OF LOWER-OUTER QUADRANT OF RIGHT BREAST OF FEMALE, ESTROGEN RECEPTOR POSITIVE (H): ICD-10-CM

## 2024-04-12 DIAGNOSIS — J45.20 MILD INTERMITTENT ASTHMA WITHOUT COMPLICATION: Primary | ICD-10-CM

## 2024-04-12 DIAGNOSIS — Z79.810 LONG-TERM CURRENT USE OF TAMOXIFEN: ICD-10-CM

## 2024-04-12 DIAGNOSIS — Z17.0 MALIGNANT NEOPLASM OF LOWER-OUTER QUADRANT OF RIGHT BREAST OF FEMALE, ESTROGEN RECEPTOR POSITIVE (H): ICD-10-CM

## 2024-04-12 PROCEDURE — 99213 OFFICE O/P EST LOW 20 MIN: CPT | Mod: 95 | Performed by: FAMILY MEDICINE

## 2024-04-12 NOTE — LETTER
[facility]  [address] [address]  [phone]        April 12, 2024      Danielle Thrasher  [address] [address]      Dear Danielle,    As your health care provider, I am concerned that your age and/or underlying medical condition puts you at particularly high risk for serious complications should you contract a COVID-19 infection.     Specifically, you have the following conditions/diagnoses, included as high risk conditions per the Centers for Disease Control and Prevention at CDC.gov.     Cancer history  Asthma    COVID-19 is a new disease and there is limited information regarding risk factors for severe disease. Based on currently available information and clinical expertise, older adults and people of any age who have serious underlying medical conditions might be at higher risk for severe illness from COVID-19.     It is my medical opinion that you should avoid close contact with others and work from home if possible during this COVID-19 pandemic until April 2025.     Tay Lieberman MD    [facility]

## 2024-04-12 NOTE — PROGRESS NOTES
Danielle is a 53 year old who is being evaluated via a billable video visit.    How would you like to obtain your AVS? MyChart  If the video visit is dropped, the invitation should be resent by: Text to cell phone: [phone]  Will anyone else be joining your video visit? No      Assessment & Plan       ICD-10-CM    1. Mild intermittent asthma without complication  J45.20       2. Malignant neoplasm of lower-outer quadrant of right breast of female, estrogen receptor positive (H)  C50.511     Z17.0       3. Long-term current use of tamoxifen  Z79.810             Review of external notes as documented elsewhere in note          There are no Patient Instructions on file for this visit.    Subjective   Danielle is a 53 year old, presenting for the following health issues:  Letter Request (Extend work from home letter)        4/12/2024    11:31 AM   Additional Questions   Roomed by Gayle   Accompanied by none         4/12/2024    11:31 AM   Patient Reported Additional Medications   Patient reports taking the following new medications none     Last letter completed by PCP 03/22/2023  Patient would like to extend working from home accomodation     History of Present Illness     Asthma:  She presents for follow up of asthma.  She has no cough, no wheezing, and some shortness of breath.  She is using a relief medication a few times a month. She does not miss any doses of her controller medication throughout the week. Patient is aware of the following triggers: dust mites, exercise or sports, mold, pollens, smoke and strong odors and fumes. The patient has not had a visit to the Emergency Room, Urgent Care or Hospital due to asthma since the last clinic visit.     Mental Health Follow-up:  Patient presents to follow-up on Depression & Anxiety.Patient's depression since last visit has been:  Medium  The patient is having other symptoms associated with depression.  Patient's anxiety since last visit has been:  Medium  The  patient is having other symptoms associated with anxiety.  Any significant life events: job concerns  Patient is not feeling anxious or having panic attacks.  Patient has no concerns about alcohol or drug use.    Reason for visit:  Requesting a doctor approval of work from home    She eats 2-3 servings of fruits and vegetables daily.She consumes 1 sweetened beverage(s) daily.She exercises with enough effort to increase her heart rate 30 to 60 minutes per day.  She exercises with enough effort to increase her heart rate 4 days per week.   She is taking medications regularly.     Patient presents to discuss work from home extension.  Patient has a history of breast cancer currently treated with tamoxifen, long-term.  Patient has a history of asthma.  Patient needs new letter  1 year extension for work form home request   has RANDY                Review of Systems  Constitutional, HEENT, cardiovascular, pulmonary, gi and gu systems are negative, except as otherwise noted.      Objective           Vitals:  No vitals were obtained today due to virtual visit.    Physical Exam   GENERAL: alert and no distress  EYES: Eyes grossly normal to inspection.  No discharge or erythema, or obvious scleral/conjunctival abnormalities.  RESP: No audible wheeze, cough, or visible cyanosis.    SKIN: Visible skin clear. No significant rash, abnormal pigmentation or lesions.  NEURO: Cranial nerves grossly intact.  Mentation and speech appropriate for age.  PSYCH: Appropriate affect, tone, and pace of words          Video-Visit Details    Type of service:  Video Visit   Originating Location (pt. Location): Home    Distant Location (provider location):  On-site  Platform used for Video Visit: Bob  Signed Electronically by: Tay Lieberman MD

## 2024-05-07 ENCOUNTER — MYC REFILL (OUTPATIENT)
Dept: FAMILY MEDICINE | Facility: CLINIC | Age: 54
End: 2024-05-07
Payer: COMMERCIAL

## 2024-05-07 ENCOUNTER — TELEPHONE (OUTPATIENT)
Dept: FAMILY MEDICINE | Facility: CLINIC | Age: 54
End: 2024-05-07

## 2024-05-07 DIAGNOSIS — Z71.3 WEIGHT LOSS COUNSELING, ENCOUNTER FOR: ICD-10-CM

## 2024-05-07 NOTE — TELEPHONE ENCOUNTER
Prior Authorization Retail Medication Request    Medication/Dose: Semaglutide-Weight Management (WEGOVY) 1 MG/0.5ML pen  Diagnosis and ICD code (if different than what is on RX):  Weight loss counseling, encounter for [Z71.3]    New/renewal/insurance change PA/secondary ins. PA: New  Previously Tried and Failed:    Rationale:  PA is needed    Insurance   Primary: [employer]  Insurance ID:  [id number]    Secondary (if applicable):  Insurance ID:      Pharmacy Information (if different than what is on RX)  Name:  [facility]  Phone:    Fax:  Ileana Bolanos CMA

## 2024-05-16 ENCOUNTER — MYC REFILL (OUTPATIENT)
Dept: FAMILY MEDICINE | Facility: CLINIC | Age: 54
End: 2024-05-16
Payer: COMMERCIAL

## 2024-05-16 DIAGNOSIS — Z71.3 WEIGHT LOSS COUNSELING, ENCOUNTER FOR: ICD-10-CM

## 2024-05-21 NOTE — TELEPHONE ENCOUNTER
It appears a prior authorization was initiated and denied at the clinic level and due to documentation limitations the central prior authorization team will not be able to do the appeal.

## 2024-05-30 ENCOUNTER — MYC REFILL (OUTPATIENT)
Dept: FAMILY MEDICINE | Facility: CLINIC | Age: 54
End: 2024-05-30
Payer: COMMERCIAL

## 2024-05-30 DIAGNOSIS — Z71.3 WEIGHT LOSS COUNSELING, ENCOUNTER FOR: ICD-10-CM

## 2024-05-31 DIAGNOSIS — Z12.11 COLON CANCER SCREENING: ICD-10-CM

## 2024-06-01 ENCOUNTER — HEALTH MAINTENANCE LETTER (OUTPATIENT)
Age: 54
End: 2024-06-01

## 2024-06-14 ENCOUNTER — ORDERS ONLY (AUTO-RELEASED) (OUTPATIENT)
Dept: ADMISSION | Facility: CLINIC | Age: 54
End: 2024-06-14
Payer: COMMERCIAL

## 2024-06-14 DIAGNOSIS — Z12.11 COLON CANCER SCREENING: ICD-10-CM

## 2024-06-16 DIAGNOSIS — F41.1 GAD (GENERALIZED ANXIETY DISORDER): ICD-10-CM

## 2024-06-17 RX ORDER — CITALOPRAM HYDROBROMIDE 40 MG/1
40 TABLET ORAL DAILY
Qty: 90 TABLET | Refills: 0 | Status: SHIPPED | OUTPATIENT
Start: 2024-06-17 | End: 2024-09-17

## 2024-08-19 ENCOUNTER — ANCILLARY PROCEDURE (OUTPATIENT)
Dept: MAMMOGRAPHY | Facility: CLINIC | Age: 54
End: 2024-08-19
Attending: INTERNAL MEDICINE
Payer: COMMERCIAL

## 2024-08-19 DIAGNOSIS — Z71.3 WEIGHT LOSS COUNSELING, ENCOUNTER FOR: ICD-10-CM

## 2024-08-19 DIAGNOSIS — Z17.0 MALIGNANT NEOPLASM OF LOWER-OUTER QUADRANT OF RIGHT BREAST OF FEMALE, ESTROGEN RECEPTOR POSITIVE (H): ICD-10-CM

## 2024-08-19 DIAGNOSIS — C50.511 MALIGNANT NEOPLASM OF LOWER-OUTER QUADRANT OF RIGHT BREAST OF FEMALE, ESTROGEN RECEPTOR POSITIVE (H): ICD-10-CM

## 2024-08-19 PROCEDURE — 77067 SCR MAMMO BI INCL CAD: CPT | Mod: 52 | Performed by: STUDENT IN AN ORGANIZED HEALTH CARE EDUCATION/TRAINING PROGRAM

## 2024-08-19 PROCEDURE — 77063 BREAST TOMOSYNTHESIS BI: CPT | Mod: 52 | Performed by: STUDENT IN AN ORGANIZED HEALTH CARE EDUCATION/TRAINING PROGRAM

## 2024-08-19 RX ORDER — SEMAGLUTIDE 1 MG/.5ML
INJECTION, SOLUTION SUBCUTANEOUS
Qty: 2 ML | Refills: 1 | Status: SHIPPED | OUTPATIENT
Start: 2024-08-19

## 2024-09-15 DIAGNOSIS — F41.1 GAD (GENERALIZED ANXIETY DISORDER): ICD-10-CM

## 2024-09-16 NOTE — TELEPHONE ENCOUNTER
Called home phone and cell phone. Left message that appointment is needed for further refills on medication. When appointment is made route to refill pool for refills.  Katherin Mathis CMA

## 2024-09-17 ENCOUNTER — ONCOLOGY VISIT (OUTPATIENT)
Dept: ONCOLOGY | Facility: CLINIC | Age: 54
End: 2024-09-17
Attending: NURSE PRACTITIONER
Payer: COMMERCIAL

## 2024-09-17 VITALS
BODY MASS INDEX: 31.53 KG/M2 | OXYGEN SATURATION: 96 % | HEART RATE: 75 BPM | WEIGHT: 178.02 LBS | DIASTOLIC BLOOD PRESSURE: 81 MMHG | SYSTOLIC BLOOD PRESSURE: 113 MMHG

## 2024-09-17 DIAGNOSIS — C50.812 MALIGNANT NEOPLASM OF OVERLAPPING SITES OF LEFT BREAST IN FEMALE, ESTROGEN RECEPTOR POSITIVE (H): ICD-10-CM

## 2024-09-17 DIAGNOSIS — Z79.810 LONG-TERM CURRENT USE OF TAMOXIFEN: ICD-10-CM

## 2024-09-17 DIAGNOSIS — Z17.0 MALIGNANT NEOPLASM OF OVERLAPPING SITES OF LEFT BREAST IN FEMALE, ESTROGEN RECEPTOR POSITIVE (H): ICD-10-CM

## 2024-09-17 DIAGNOSIS — Z12.31 ENCOUNTER FOR SCREENING MAMMOGRAM FOR BREAST CANCER: ICD-10-CM

## 2024-09-17 DIAGNOSIS — M85.89 OSTEOPENIA OF MULTIPLE SITES: ICD-10-CM

## 2024-09-17 LAB
ALBUMIN SERPL BCG-MCNC: 4.2 G/DL (ref 3.5–5.2)
ALP SERPL-CCNC: 52 U/L (ref 40–150)
ALT SERPL W P-5'-P-CCNC: 14 U/L (ref 0–50)
ANION GAP SERPL CALCULATED.3IONS-SCNC: 9 MMOL/L (ref 7–15)
AST SERPL W P-5'-P-CCNC: 24 U/L (ref 0–45)
BILIRUB SERPL-MCNC: 0.2 MG/DL
BUN SERPL-MCNC: 18.6 MG/DL (ref 6–20)
CALCIUM SERPL-MCNC: 9.6 MG/DL (ref 8.8–10.4)
CHLORIDE SERPL-SCNC: 102 MMOL/L (ref 98–107)
CREAT SERPL-MCNC: 0.92 MG/DL (ref 0.51–0.95)
EGFRCR SERPLBLD CKD-EPI 2021: 74 ML/MIN/1.73M2
GLUCOSE SERPL-MCNC: 84 MG/DL (ref 70–99)
HCO3 SERPL-SCNC: 28 MMOL/L (ref 22–29)
HOLD SPECIMEN: NORMAL
HOLD SPECIMEN: NORMAL
POTASSIUM SERPL-SCNC: 3.7 MMOL/L (ref 3.4–5.3)
PROT SERPL-MCNC: 8.1 G/DL (ref 6.4–8.3)
SODIUM SERPL-SCNC: 139 MMOL/L (ref 135–145)
VIT D+METAB SERPL-MCNC: 76 NG/ML (ref 20–50)

## 2024-09-17 PROCEDURE — 99214 OFFICE O/P EST MOD 30 MIN: CPT | Performed by: INTERNAL MEDICINE

## 2024-09-17 PROCEDURE — 36415 COLL VENOUS BLD VENIPUNCTURE: CPT | Performed by: INTERNAL MEDICINE

## 2024-09-17 PROCEDURE — 82247 BILIRUBIN TOTAL: CPT | Performed by: INTERNAL MEDICINE

## 2024-09-17 PROCEDURE — 84155 ASSAY OF PROTEIN SERUM: CPT | Performed by: INTERNAL MEDICINE

## 2024-09-17 PROCEDURE — 82306 VITAMIN D 25 HYDROXY: CPT | Performed by: INTERNAL MEDICINE

## 2024-09-17 RX ORDER — FEXOFENADINE HCL 60 MG/1
60 TABLET, FILM COATED ORAL 2 TIMES DAILY
COMMUNITY

## 2024-09-17 RX ORDER — CITALOPRAM HYDROBROMIDE 40 MG/1
40 TABLET ORAL DAILY
Qty: 90 TABLET | Refills: 3 | Status: SHIPPED | OUTPATIENT
Start: 2024-09-17

## 2024-09-17 RX ORDER — TAMOXIFEN CITRATE 20 MG/1
20 TABLET ORAL DAILY
Qty: 90 TABLET | Refills: 3 | Status: SHIPPED | OUTPATIENT
Start: 2024-09-17

## 2024-09-17 ASSESSMENT — PAIN SCALES - GENERAL: PAINLEVEL: NO PAIN (0)

## 2024-09-17 NOTE — NURSING NOTE
"Oncology Rooming Note    September 17, 2024 2:05 PM   Danielle Thrasher is a 54 year old female who presents for:    Chief Complaint   Patient presents with    Oncology Clinic Visit     Initial Vitals: /81 (BP Location: Left arm)   Pulse 75   Wt 80.7 kg (178 lb 0.3 oz)   SpO2 96%   BMI 31.53 kg/m   Estimated body mass index is 31.53 kg/m  as calculated from the following:    Height as of 3/12/24: 1.6 m (5' 3\").    Weight as of this encounter: 80.7 kg (178 lb 0.3 oz). Body surface area is 1.89 meters squared.  No Pain (0) Comment: Data Unavailable   No LMP recorded. (Menstrual status: IUD).  Allergies reviewed: Yes  Medications reviewed: Yes    Medications: Medication refills not needed today.  Pharmacy name entered into [employer]:    [id number] [place], [place] - [address] AT [facility] & [place]  [facility] [place] - [address] [place] [address]  [facility] - [place], [place] - [facility] [address]    Frailty Screening:   Is the patient here for a new oncology consult visit in cancer care? 2. No      Clinical concerns: No Concerns        Jordan Williamson MA            "

## 2024-09-17 NOTE — LETTER
2024      Danielle Thrasher  [address]  [place] [address]      Dear Colleague,    Thank you for referring your patient, Danielle Thrasher, to the [facility] [place] [facility]. Please see a copy of my visit note below.    [facility] Hematology / Oncology  Progress Note  Name: Danielle Thrasher  :  1970    MRN:  [id number]    --------------------    Assessment / Plan:  Stage IIA (pT2 pN1a cM0), hormone positive, HER-2 negative, right-sided breast cancer.  # Right breast mastectomy with sentinel lymph node 2022.  # Oncotype Dx score 11; chemotherapy not indicated.  # Adjuvant right chest wall and region antonino radiation 50 Gy over 25 frxn Apr - May 2022.  # Adjuvant Tamoxifen May 2022 - ongoing.    Continue tamoxifen; planning 10 years; clinically and radiographically KATERYNA.  Continue calcium and vitamin D for osteopenia; non-smoker, incredibly active; DEXA scan Aug 2023 osteopenia.  Continue annual mammography; ordered next year.  Checking CMP and vitamin D.  RTC 12 months w/ mammogram and DEXA and labs (FSH, estradiol, CMP).    Enrrique Huitron MD    --------------------    Interval History:  Danielle returns for follow up of breast cancer.  All in all, enjoying good health.  Looking forward to a 24 hour marathon walk.  Definitely getting her 10k steps a day.  No major side effects from tamoxifen.  Copper IUD in place with some irregular menses.  Some more perimenopausal symptoms of vasomotor hot flashes.    Family History Cancer:  Mother - breast cancer treated w/ lumpectomy and radiation complicated by angiosarcoma later treated w/ mastectomy and radiation.    Social History:  Social History     Tobacco Use     Smoking status: Never     Smokeless tobacco: Never     Tobacco comments:     non-smoking household   Vaping Use     Vaping status: Never Used   Substance Use Topics     Alcohol use: Yes     Alcohol/week: 2.5 standard drinks of alcohol     Comment:  glass of wine 2-3 times weekly with 1 liquor drink on the weekend     Drug use: No       --------------------    Physical Exam:  VS: /81 (BP Location: Left arm)   Pulse 75   Wt 80.7 kg (178 lb 0.3 oz)   SpO2 96%   BMI 31.53 kg/m  .  GEN: Well appearing.  BREAST: No palpable breast masses or axillary adenopathy.    Labs / Imaging:  Reviewed mammogram.    Again, thank you for allowing me to participate in the care of your patient.        Sincerely,        Enrrique Huitron MD

## 2024-09-18 NOTE — PATIENT INSTRUCTIONS
BJT MG 12 months w/ labs (FSH, estradiol, CMP, vitamin D), mammogram and DEXA.    Enrrique Huitron MD.

## 2024-09-18 NOTE — PROGRESS NOTES
[facility] Hematology / Oncology  Progress Note  Name: Danielle Thrasher  :  1970    MRN:  [id number]    --------------------    Assessment / Plan:  Stage IIA (pT2 pN1a cM0), hormone positive, HER-2 negative, right-sided breast cancer.  # Right breast mastectomy with sentinel lymph node 2022.  # Oncotype Dx score 11; chemotherapy not indicated.  # Adjuvant right chest wall and region antonino radiation 50 Gy over 25 frxn Apr - May 2022.  # Adjuvant Tamoxifen May 2022 - ongoing.    Continue tamoxifen; planning 10 years; clinically and radiographically KATERYNA.  Continue calcium and vitamin D for osteopenia; non-smoker, incredibly active; DEXA scan Aug 2023 osteopenia.  Continue annual mammography; ordered next year.  Checking CMP and vitamin D.  RTC 12 months w/ mammogram and DEXA and labs (FSH, estradiol, CMP).    Enrrique Huitron MD    --------------------    Interval History:  Danielle returns for follow up of breast cancer.  All in all, enjoying good health.  Looking forward to a 24 hour marathon walk.  Definitely getting her 10k steps a day.  No major side effects from tamoxifen.  Copper IUD in place with some irregular menses.  Some more perimenopausal symptoms of vasomotor hot flashes.    Family History Cancer:  Mother - breast cancer treated w/ lumpectomy and radiation complicated by angiosarcoma later treated w/ mastectomy and radiation.    Social History:  Social History     Tobacco Use    Smoking status: Never    Smokeless tobacco: Never    Tobacco comments:     non-smoking household   Vaping Use    Vaping status: Never Used   Substance Use Topics    Alcohol use: Yes     Alcohol/week: 2.5 standard drinks of alcohol     Comment: glass of wine 2-3 times weekly with 1 liquor drink on the weekend    Drug use: No       --------------------    Physical Exam:  VS: /81 (BP Location: Left arm)   Pulse 75   Wt 80.7 kg (178 lb 0.3 oz)   SpO2 96%   BMI 31.53 kg/m  .  GEN: Well  appearing.  BREAST: No palpable breast masses or axillary adenopathy.    Labs / Imaging:  Reviewed mammogram.

## 2024-09-20 ENCOUNTER — TELEPHONE (OUTPATIENT)
Dept: FAMILY MEDICINE | Facility: CLINIC | Age: 54
End: 2024-09-20
Payer: COMMERCIAL

## 2024-09-20 NOTE — TELEPHONE ENCOUNTER
Patient Quality Outreach    Patient is due for the following:   Asthma  -  ACT needed, Asthma follow-up visit, and AAP  Cervical Cancer Screening - PAP Needed  Physical Preventive Adult Physical    Next Steps:   Schedule a office visit for asthma Adult Preventative    Type of outreach:    Sent [employer] message.      Questions for provider review:    None           Gayle Golden, [facility]

## 2024-09-27 RX ORDER — CITALOPRAM HYDROBROMIDE 40 MG/1
40 TABLET ORAL DAILY
Qty: 90 TABLET | Refills: 3 | OUTPATIENT
Start: 2024-09-27

## 2024-10-22 DIAGNOSIS — Z71.3 WEIGHT LOSS COUNSELING, ENCOUNTER FOR: ICD-10-CM

## 2024-10-24 RX ORDER — SEMAGLUTIDE 1 MG/.5ML
INJECTION, SOLUTION SUBCUTANEOUS
Qty: 2 ML | Refills: 1 | Status: SHIPPED | OUTPATIENT
Start: 2024-10-24

## 2024-10-29 ENCOUNTER — TELEPHONE (OUTPATIENT)
Dept: FAMILY MEDICINE | Facility: CLINIC | Age: 54
End: 2024-10-29
Payer: COMMERCIAL

## 2024-10-29 NOTE — TELEPHONE ENCOUNTER
Prior Authorization Retail Medication Request    Medication/Dose: Semaglutide-Weight Management (WEGOVY) 1 MG/0.5ML pen  Diagnosis and ICD code (if different than what is on RX):    New/renewal/insurance change PA/secondary ins. PA:  Previously Tried and Failed:    Rationale:      Insurance   Primary:   Insurance ID:      Secondary (if applicable):  Insurance ID:      Pharmacy Information (if different than what is on RX)  Name:    Phone:    Fax:    Clinic Information  Preferred routing pool for dept communication:

## 2024-10-30 NOTE — TELEPHONE ENCOUNTER
Central Prior Authorization Team   Phone: [phone]    PA Initiation    Medication: Semaglutide-Weight Management (WEGOVY) 1 MG/0.5ML pen  Insurance Company: [employer] Specialty - Phone [phone] Fax [phone]  Pharmacy Filling the Rx: [facility] - [place], [place] - [address]  Filling Pharmacy Phone: [phone]  Filling Pharmacy Fax:    Start Date: 10/30/2024

## 2024-10-30 NOTE — TELEPHONE ENCOUNTER
Prior Authorization Approval    Authorization Effective Date: 9/30/2024  Authorization Expiration Date: 11/29/2024  Medication: Semaglutide-Weight Management (WEGOVY) 1 MG/0.5ML pen  Approved Dose/Quantity:    Reference #:     Insurance Company: [employer] Specialty - Phone [phone] Fax [phone]  Expected CoPay:       CoPay Card Available:      Foundation Assistance Needed:    Which Pharmacy is filling the prescription (Not needed for infusion/clinic administered): [facility] - [place], [place] - [address]  Pharmacy Notified:  Yes  Patient Notified:  **Instructed pharmacy to notify patient when script is ready to /ship.**

## 2024-12-22 LAB — NONINV COLON CA DNA+OCC BLD SCRN STL QL: NEGATIVE

## 2025-01-11 DIAGNOSIS — Z71.3 WEIGHT LOSS COUNSELING, ENCOUNTER FOR: ICD-10-CM

## 2025-01-13 RX ORDER — SEMAGLUTIDE 1 MG/.5ML
INJECTION, SOLUTION SUBCUTANEOUS
Qty: 2 ML | Refills: 0 | Status: SHIPPED | OUTPATIENT
Start: 2025-01-13

## 2025-01-15 ENCOUNTER — TELEPHONE (OUTPATIENT)
Dept: FAMILY MEDICINE | Facility: CLINIC | Age: 55
End: 2025-01-15
Payer: COMMERCIAL

## 2025-01-15 DIAGNOSIS — Z71.3 WEIGHT LOSS COUNSELING, ENCOUNTER FOR: Primary | ICD-10-CM

## 2025-01-15 NOTE — TELEPHONE ENCOUNTER
Prior Authorization Retail Medication Request    Medication/Dose: WEGOVY 1MG/0.5ML AUTO INJECTORS  Diagnosis and ICD code (if different than what is on RX):    New/renewal/insurance change PA/secondary ins. PA:  Previously Tried and Failed:    Rationale:  PA REQUIRED     Insurance   Primary: [employer]  Insurance ID:  [id number]    Secondary (if applicable):[facility] [employer]   Insurance ID:  [id number]    Pharmacy Information (if different than what is on RX)  Name:  [facility]  Phone:  [phone]  Fax:    Clinic Information  Preferred routing pool for dept communication:  PRIMARY CARE POOL    KEY:[id number]  PATIENT LAST NAME:FLAQUITA  :1970    Christin Vila CMA  [facility]

## 2025-01-17 NOTE — TELEPHONE ENCOUNTER
Retail Pharmacy Prior Authorization Team   Phone: [phone]    PA Initiation    Medication: WEGOVY 1 MG/0.5ML SC SOAJ  Insurance Company: [employer] Non-Specialty PA's - Phone [phone] Fax [phone]  Pharmacy Filling the Rx: [facility] - [place], [place] - [address]  Filling Pharmacy Phone: [phone]  Filling Pharmacy Fax: [phone]  Start Date: 1/17/2025

## 2025-01-22 NOTE — TELEPHONE ENCOUNTER
PRIOR AUTHORIZATION DENIED    Medication: WEGOVY 1 MG/0.5ML SC SOAJ  Insurance Company: [employer] Non-Specialty PA's - Phone [phone] Fax [phone]  Denial Date: 1/17/2025  Denial Reason(s):       Appeal Information:

## 2025-02-10 NOTE — TELEPHONE ENCOUNTER
Attempt #1 to call patient.     RN left voicemail and requested return call to [facility] at [phone] and ask to speak to a nurse.     When pt returns call to clinic please relay message below from PCP:        Isaura Steinberg RN   [facility]

## 2025-02-12 ENCOUNTER — TELEPHONE (OUTPATIENT)
Dept: FAMILY MEDICINE | Facility: CLINIC | Age: 55
End: 2025-02-12
Payer: COMMERCIAL

## 2025-02-12 NOTE — TELEPHONE ENCOUNTER
Attempt #2    Spoke with pt and notified of providers message below. Agreeable to see MTM. Asked that I send MTM number in a mychart message. MyChart message sent to patient.     Thanks,  DEWAYNE Eagle  [facility]

## 2025-02-12 NOTE — TELEPHONE ENCOUNTER
RIKY referral from: [facility] visit (referral by provider)    MTM referral outreach attempt #2 on February 12, 2025 at 1:39 PM      Outcome: Patient not reachable after several attempts, sent [employer] message    Use vbc for the carrier/Plan on the flowsheet      [employer] Message Sent    RIKY Bragg   [phone]

## 2025-04-07 DIAGNOSIS — Z71.3 WEIGHT LOSS COUNSELING, ENCOUNTER FOR: ICD-10-CM

## 2025-04-09 RX ORDER — SEMAGLUTIDE 1 MG/.5ML
INJECTION, SOLUTION SUBCUTANEOUS
Qty: 2 ML | Refills: 0 | OUTPATIENT
Start: 2025-04-09

## 2025-04-11 ASSESSMENT — ASTHMA QUESTIONNAIRES
ACT_TOTALSCORE: 21
QUESTION_1 LAST FOUR WEEKS HOW MUCH OF THE TIME DID YOUR ASTHMA KEEP YOU FROM GETTING AS MUCH DONE AT WORK, SCHOOL OR AT HOME: A LITTLE OF THE TIME
QUESTION_3 LAST FOUR WEEKS HOW OFTEN DID YOUR ASTHMA SYMPTOMS (WHEEZING, COUGHING, SHORTNESS OF BREATH, CHEST TIGHTNESS OR PAIN) WAKE YOU UP AT NIGHT OR EARLIER THAN USUAL IN THE MORNING: ONCE OR TWICE
QUESTION_5 LAST FOUR WEEKS HOW WOULD YOU RATE YOUR ASTHMA CONTROL: WELL CONTROLLED
QUESTION_4 LAST FOUR WEEKS HOW OFTEN HAVE YOU USED YOUR RESCUE INHALER OR NEBULIZER MEDICATION (SUCH AS ALBUTEROL): NOT AT ALL
QUESTION_2 LAST FOUR WEEKS HOW OFTEN HAVE YOU HAD SHORTNESS OF BREATH: ONCE OR TWICE A WEEK

## 2025-04-14 ENCOUNTER — VIRTUAL VISIT (OUTPATIENT)
Dept: FAMILY MEDICINE | Facility: CLINIC | Age: 55
End: 2025-04-14
Payer: COMMERCIAL

## 2025-04-14 DIAGNOSIS — Z79.810 LONG-TERM CURRENT USE OF TAMOXIFEN: ICD-10-CM

## 2025-04-14 DIAGNOSIS — Z71.3 WEIGHT LOSS COUNSELING, ENCOUNTER FOR: Primary | ICD-10-CM

## 2025-04-14 DIAGNOSIS — C77.3 SECONDARY AND UNSPECIFIED MALIGNANT NEOPLASM OF AXILLA AND UPPER LIMB LYMPH NODES (H): ICD-10-CM

## 2025-04-14 PROCEDURE — 98005 SYNCH AUDIO-VIDEO EST LOW 20: CPT | Performed by: FAMILY MEDICINE

## 2025-04-14 RX ORDER — SEMAGLUTIDE 1 MG/.5ML
INJECTION, SOLUTION SUBCUTANEOUS
Qty: 2 ML | Refills: 0 | Status: CANCELLED | OUTPATIENT
Start: 2025-04-14

## 2025-04-14 NOTE — PROGRESS NOTES
Danielle is a 54 year old who is being evaluated via a billable video visit.    How would you like to obtain your AVS? MyChart  If the video visit is dropped, the invitation should be resent by: Text to cell phone: [phone]  Will anyone else be joining your video visit? No      Assessment & Plan       ICD-10-CM    1. Weight loss counseling, encounter for  Z71.3 Semaglutide-Weight Management (WEGOVY) 1.7 MG/0.75ML pen      2. Long-term current use of tamoxifen  Z79.810 Semaglutide-Weight Management (WEGOVY) 1.7 MG/0.75ML pen      3. 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 Due to the added complexity in care, I will continue to support Danielle in the subsequent management and with ongoing continuity of care.           Subjective   Danielle is a 54 year old, presenting for the following health issues:  Refill Request (Wegovy)      4/14/2025     2:35 PM   Additional Questions   Roomed by Gayle   Accompanied by none         4/14/2025     2:35 PM   Patient Reported Additional Medications   Patient reports taking the following new medications none     History of Present Illness       Reason for visit:  Wegovy refill    She eats 4 or more servings of fruits and vegetables daily.She consumes 1 sweetened beverage(s) daily.She exercises with enough effort to increase her heart rate 20 to 29 minutes per day.  She exercises with enough effort to increase her heart rate 4 days per week.   She is taking medications regularly.        Wt Readings from Last 4 Encounters:   09/17/24 80.7 kg (178 lb 0.3 oz)   03/12/24 83.1 kg (183 lb 3.2 oz)   08/22/23 85 kg (187 lb 4.8 oz)   11/23/22 80.3 kg (177 lb)       No side effects reported  First 2 weeks on 1mg - upset stomach that cleared up              Review of Systems  Constitutional, HEENT, cardiovascular,  pulmonary, gi and gu systems are negative, except as otherwise noted.      Objective           Vitals:  No vitals were obtained today due to virtual visit.    Physical Exam   GENERAL: alert and no distress  EYES: Eyes grossly normal to inspection.  No discharge or erythema, or obvious scleral/conjunctival abnormalities.  RESP: No audible wheeze, cough, or visible cyanosis.    SKIN: Visible skin clear. No significant rash, abnormal pigmentation or lesions.  NEURO: Cranial nerves grossly intact.  Mentation and speech appropriate for age.  PSYCH: Appropriate affect, tone, and pace of words          Video-Visit Details    Type of service:  Video Visit   Originating Location (pt. Location): Home    Distant Location (provider location):  On-site  Platform used for Video Visit: Bob  Signed Electronically by: Tay Lieberman MD

## 2025-04-23 PROBLEM — C77.3 SECONDARY AND UNSPECIFIED MALIGNANT NEOPLASM OF AXILLA AND UPPER LIMB LYMPH NODES (H): Status: ACTIVE | Noted: 2025-04-23

## 2025-06-02 ENCOUNTER — VIRTUAL VISIT (OUTPATIENT)
Dept: FAMILY MEDICINE | Facility: CLINIC | Age: 55
End: 2025-06-02
Payer: COMMERCIAL

## 2025-06-02 DIAGNOSIS — J45.20 MILD INTERMITTENT ASTHMA WITHOUT COMPLICATION: Primary | ICD-10-CM

## 2025-06-02 DIAGNOSIS — Z79.810 LONG-TERM CURRENT USE OF TAMOXIFEN: ICD-10-CM

## 2025-06-02 DIAGNOSIS — C50.919 MALIGNANT NEOPLASM OF FEMALE BREAST, UNSPECIFIED ESTROGEN RECEPTOR STATUS, UNSPECIFIED LATERALITY, UNSPECIFIED SITE OF BREAST (H): ICD-10-CM

## 2025-06-02 PROCEDURE — 98006 SYNCH AUDIO-VIDEO EST MOD 30: CPT | Performed by: FAMILY MEDICINE

## 2025-06-02 ASSESSMENT — ASTHMA QUESTIONNAIRES: ACT_TOTALSCORE: 14

## 2025-06-02 NOTE — LETTER
April 20, 2025      Danielle Thrasher  [address]  [place] [address]        Dear Danielle,     As your health care provider, I am concerned that your age and/or underlying medical condition puts you at particularly high risk for serious complications should you contract a COVID-19 infection.      Specifically, you have the following conditions/diagnoses, included as high risk conditions per the Centers for Disease Control and Prevention at CDC.gov.      Cancer history  Asthma     Based on currently available information and clinical expertise, older adults and people of any age who have serious underlying medical conditions might be at higher risk for severe illness from COVID-19.      It is my medical opinion that you should avoid close contact with others and work from home if possible at least until April 2026.         Sincerely,        Tay Lieberman MD    Electronically signed

## 2025-06-02 NOTE — PROGRESS NOTES
Danielle is a 54 year old who is being evaluated via a billable video visit.    How would you like to obtain your AVS? MyChart  If the video visit is dropped, the invitation should be resent by: Text to cell phone: [phone]  Will anyone else be joining your video visit? No      Assessment & Plan       ICD-10-CM    1. Mild intermittent asthma without complication  J45.20       2. Malignant neoplasm of female breast, unspecified estrogen receptor status, unspecified laterality, unspecified site of breast (H)  C50.919       3. Long-term current use of tamoxifen  Z79.810             A total of 30 minutes spent face-to-face with greater than 50% of the time spent in counseling and coordinating cares of the issues above     The longitudinal plan of care for the diagnosis(es)/condition(s) as documented were addressed during this visit. Due to the added complexity in care, I will continue to support Danielle in the subsequent management and with ongoing continuity of care.         Subjective   Danielle is a 54 year old, presenting for the following health issues:  Forms (Remote work renewal)      6/2/2025    11:31 AM   Additional Questions   Roomed by Gayle   Accompanied by none         6/2/2025   Forms   Any forms needing to be completed Yes         6/2/2025    11:31 AM   Patient Reported Additional Medications   Patient reports taking the following new medications none     History of Present Illness       Reason for visit:  Need letter from doctor for remote work    She eats 4 or more servings of fruits and vegetables daily.She consumes 1 sweetened beverage(s) daily.She exercises with enough effort to increase her heart rate 20 to 29 minutes per day.  She exercises with enough effort to increase her heart rate 3 or less days per week.   She is taking medications regularly.        Patient presents to request letter to work remotely given her history of asthma and breast cancer.  4/20/2025 through 4/20/2026           6/2/2025   Asthma   1.  In the past 4 weeks, how much of the time did your asthma keep you from getting as much done at work, school or at home? 3   2.  During the past 4 weeks, how often have you had shortness of breath? 3   3.  During the past 4 weeks, how often did your asthma symptoms (wheezing, coughing, shortness of breath, chest tightness or pain) wake you up at night or earlier than usual in the morning? 1   4.  During the past 4 weeks, how often have you used your rescue inhaler or nebulizer medication (such as albuterol)? 4   5.  How would you rate your asthma control during the past 4 weeks? 3   ACT TOTAL SCORE (Goal Greater than or Equal to 20) 14    In the past 12 months, how many times did you visit the emergency room for your asthma without being admitted to the hospital? 0   In the past 12 months, how many times were you hospitalized overnight because of your asthma? 0   Do you have a cough, wheezing or shortness of breath? (!) COUGH    (!) NOISY BREATHING (WHEEZING)    (!) TROUBLE WITH BREATHING (SHORTNESS OF BREATH)   What makes your asthma/breathing worse? Smoke    Dust mites    Pollens    Mold    Humidity    Strong odors and fumes    Exercise or sports    Cold air   Do you want more information about how to use your inhaler? No       Patient-reported    Multiple values from one day are sorted in reverse-chronological order                 Review of Systems  Constitutional, HEENT, cardiovascular, pulmonary, gi and gu systems are negative, except as otherwise noted.      Objective           Vitals:  No vitals were obtained today due to virtual visit.    Physical Exam   GENERAL: alert and no distress  EYES: Eyes grossly normal to inspection.  No discharge or erythema, or obvious scleral/conjunctival abnormalities.  RESP: No audible wheeze, cough, or visible cyanosis.    SKIN: Visible skin clear. No significant rash, abnormal pigmentation or lesions.  NEURO: Cranial nerves grossly intact.  Mentation  and speech appropriate for age.  PSYCH: Appropriate affect, tone, and pace of words          Video-Visit Details    Type of service:  Video Visit   Originating Location (pt. Location): Home    Distant Location (provider location):  On-site  Platform used for Video Visit: Bob  Signed Electronically by: Tay Lieberman MD

## 2025-06-12 ENCOUNTER — PATIENT OUTREACH (OUTPATIENT)
Dept: CARE COORDINATION | Facility: CLINIC | Age: 55
End: 2025-06-12
Payer: COMMERCIAL

## 2025-06-14 ENCOUNTER — HEALTH MAINTENANCE LETTER (OUTPATIENT)
Age: 55
End: 2025-06-14

## 2025-08-03 DIAGNOSIS — Z71.3 WEIGHT LOSS COUNSELING, ENCOUNTER FOR: ICD-10-CM

## 2025-08-03 DIAGNOSIS — Z79.810 LONG-TERM CURRENT USE OF TAMOXIFEN: ICD-10-CM

## 2025-08-04 RX ORDER — SEMAGLUTIDE 1.7 MG/.75ML
INJECTION, SOLUTION SUBCUTANEOUS
Qty: 3 ML | Refills: 1 | Status: SHIPPED | OUTPATIENT
Start: 2025-08-04

## (undated) DEVICE — DRSG STERI STRIP 1/2X4" R1547

## (undated) DEVICE — DRSG KERLIX 4 1/2"X4YDS ROLL 6715

## (undated) DEVICE — PREP SKIN SCRUB TRAY 4461A

## (undated) DEVICE — DRAPE BREAST/CHEST 29420

## (undated) DEVICE — DRSG DRAIN 4X4" 7086

## (undated) DEVICE — SYR EAR BULB 3OZ 0035830

## (undated) DEVICE — Device

## (undated) DEVICE — LINEN TOWEL PACK X5 5464

## (undated) DEVICE — SOLUTION WOUND CLEANSING 3/4OZ 10% PVP EA-L3011FB-50

## (undated) DEVICE — SYR 10ML FINGER CONTROL W/O NDL 309695

## (undated) DEVICE — SLEEVE PROTECTIVE BREAST BIOPSY  GMSLV001-10

## (undated) DEVICE — SOL WATER IRRIG 1000ML BOTTLE 2F7114

## (undated) DEVICE — GLOVE PROTEXIS BLUE W/NEU-THERA 6.5  2D73EB65

## (undated) DEVICE — GLOVE PROTEXIS MICRO 6.0  2D73PM60

## (undated) DEVICE — NDL 19GA 1.5"

## (undated) DEVICE — ESU ELEC BLADE 2.75" COATED/INSULATED E1455

## (undated) DEVICE — ESU ELEC BLADE NN-STCK PLUMEPEN PRO 360D 10FT PLPRO4020

## (undated) DEVICE — BNDG ELASTIC 6" DBL LENGTH UNSTERILE 6611-16

## (undated) DEVICE — SU MONOCRYL 4-0 PS-2 18" UND Y496G

## (undated) DEVICE — SU MONOCRYL 3-0 PS-2 27" Y427H

## (undated) DEVICE — DRSG BIOPATCH GERMICIDAL SPLIT SPONGE 4MM MED 4150

## (undated) DEVICE — DRSG TEGADERM 8X12" 1629

## (undated) DEVICE — CLIP APPLIER LIGACLIP 11" MED SHORT 20 CT MSM20

## (undated) DEVICE — DRAIN JACKSON PRATT RESERVOIR 100ML SU130-1305

## (undated) DEVICE — SUCTION CANISTER MEDIVAC LINER 3000ML W/LID 65651-530

## (undated) DEVICE — DRAIN JACKSON PRATT 15FR ROUND SIL LF JP-2229

## (undated) DEVICE — SU VICRYL 0 TIE 12X18" J906G

## (undated) DEVICE — PACK MAJOR SBA15MAFSI

## (undated) DEVICE — ADH LIQUID MASTISOL TOPICAL VIAL 2-3ML 0523-48

## (undated) DEVICE — SU SILK 2-0 SH 30" K833H

## (undated) DEVICE — NDL 22GA 1.5"

## (undated) DEVICE — SOL NACL 0.9% INJ 1000ML BAG 2B1324X

## (undated) DEVICE — PAD CHUX UNDERPAD 23X24" 7136

## (undated) DEVICE — SYR 50ML LL W/O NDL 309653

## (undated) DEVICE — GLOVE PROTEXIS BLUE W/NEU-THERA 7.5  2D73EB75

## (undated) DEVICE — SU VICRYL 3-0 TIE 12X18" J904T

## (undated) DEVICE — KIT SPY ELITE DISP KIT W/DRAPE SGL PACK LC3001

## (undated) RX ORDER — CEFAZOLIN SODIUM/WATER 2 G/20 ML
SYRINGE (ML) INTRAVENOUS
Status: DISPENSED
Start: 2022-02-08

## (undated) RX ORDER — FENTANYL CITRATE 50 UG/ML
INJECTION, SOLUTION INTRAMUSCULAR; INTRAVENOUS
Status: DISPENSED
Start: 2022-02-08

## (undated) RX ORDER — GLYCOPYRROLATE 0.2 MG/ML
INJECTION, SOLUTION INTRAMUSCULAR; INTRAVENOUS
Status: DISPENSED
Start: 2022-02-08

## (undated) RX ORDER — NEOSTIGMINE METHYLSULFATE 1 MG/ML
VIAL (ML) INJECTION
Status: DISPENSED
Start: 2022-02-08

## (undated) RX ORDER — BUPIVACAINE HYDROCHLORIDE AND EPINEPHRINE 2.5; 5 MG/ML; UG/ML
INJECTION, SOLUTION EPIDURAL; INFILTRATION; INTRACAUDAL; PERINEURAL
Status: DISPENSED
Start: 2022-02-08

## (undated) RX ORDER — CELECOXIB 200 MG/1
CAPSULE ORAL
Status: DISPENSED
Start: 2022-02-08

## (undated) RX ORDER — FENTANYL CITRATE 0.05 MG/ML
INJECTION, SOLUTION INTRAMUSCULAR; INTRAVENOUS
Status: DISPENSED
Start: 2022-02-08

## (undated) RX ORDER — GABAPENTIN 300 MG/1
CAPSULE ORAL
Status: DISPENSED
Start: 2022-02-08

## (undated) RX ORDER — OXYCODONE HCL 10 MG/1
TABLET, FILM COATED, EXTENDED RELEASE ORAL
Status: DISPENSED
Start: 2022-02-08